# Patient Record
Sex: FEMALE | Race: WHITE
[De-identification: names, ages, dates, MRNs, and addresses within clinical notes are randomized per-mention and may not be internally consistent; named-entity substitution may affect disease eponyms.]

---

## 2017-11-20 ENCOUNTER — HOSPITAL ENCOUNTER (EMERGENCY)
Dept: HOSPITAL 56 - MW.ED | Age: 77
Discharge: HOME | End: 2017-11-20
Payer: MEDICARE

## 2017-11-20 VITALS — DIASTOLIC BLOOD PRESSURE: 32 MMHG | SYSTOLIC BLOOD PRESSURE: 112 MMHG

## 2017-11-20 DIAGNOSIS — I50.9: ICD-10-CM

## 2017-11-20 DIAGNOSIS — Z88.5: ICD-10-CM

## 2017-11-20 DIAGNOSIS — S01.01XA: ICD-10-CM
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
## 2017-11-20 DIAGNOSIS — Z79.82: ICD-10-CM

## 2017-11-20 DIAGNOSIS — Z91.040: ICD-10-CM

## 2017-11-20 DIAGNOSIS — Z79.899: ICD-10-CM

## 2017-11-20 DIAGNOSIS — S06.0X0A: Primary | ICD-10-CM

## 2017-11-20 LAB
CHLORIDE SERPL-SCNC: 108 MMOL/L (ref 98–110)
SODIUM SERPL-SCNC: 142 MMOL/L (ref 136–146)

## 2017-11-20 PROCEDURE — 72125 CT NECK SPINE W/O DYE: CPT

## 2017-11-20 PROCEDURE — 72170 X-RAY EXAM OF PELVIS: CPT

## 2017-11-20 PROCEDURE — 85610 PROTHROMBIN TIME: CPT

## 2017-11-20 PROCEDURE — 85025 COMPLETE CBC W/AUTO DIFF WBC: CPT

## 2017-11-20 PROCEDURE — 82553 CREATINE MB FRACTION: CPT

## 2017-11-20 PROCEDURE — 93005 ELECTROCARDIOGRAM TRACING: CPT

## 2017-11-20 PROCEDURE — 99285 EMERGENCY DEPT VISIT HI MDM: CPT

## 2017-11-20 PROCEDURE — G0390 TRAUMA RESPONS W/HOSP CRITI: HCPCS

## 2017-11-20 PROCEDURE — 12002 RPR S/N/AX/GEN/TRNK2.6-7.5CM: CPT

## 2017-11-20 PROCEDURE — 71010: CPT

## 2017-11-20 PROCEDURE — 36415 COLL VENOUS BLD VENIPUNCTURE: CPT

## 2017-11-20 PROCEDURE — 80053 COMPREHEN METABOLIC PANEL: CPT

## 2017-11-20 PROCEDURE — 84484 ASSAY OF TROPONIN QUANT: CPT

## 2017-11-20 PROCEDURE — 70450 CT HEAD/BRAIN W/O DYE: CPT

## 2017-11-20 PROCEDURE — 82550 ASSAY OF CK (CPK): CPT

## 2017-11-20 NOTE — CT
EXAMINATION: CT cervical spine

 

HISTORY: Pain

 

COMPARISON: None

 

TECHNIQUE: Axial CT images obtained through the cervical spine without contrast. Coronal and sagittal
 reconstructions obtained. Findings detail is obscured secondary to body habitus.

 

FINDINGS: Mild reversal of the normal cervical lordosis. Posterior fusion hardware is noted bilateral
ly from C3 to C6 with overlying laminectomies. Bone mineralization appears grossly normal. No definit
e fracture or acute osseous abnormality. The paravertebral soft tissues appear normal. The lung apice
s are clear.

 

IMPRESSION: 

1. Postsurgical changes noted within the cervical spine without an acute osseous abnormality identifi
ed.

## 2017-11-20 NOTE — CR
EXAMINATION: Pelvis

 

HISTORY: Pain

 

COMPARISON: CT dated 12/30/2016

 

TECHNIQUE: AP view of the pelvis.

 

FINDINGS: There is no acute osseous abnormality, dislocation, or fracture. The iliopectineal and ilio
ischial lines are intact. SI joints are symmetric. Bone mineralization is normal. Hip joint spaces ar
e grossly preserved.

 

IMPRESSION: No acute osseous abnormality identified.

## 2017-11-20 NOTE — CT
EXAMINATION:  Non contrast CT head. Coronal and sagittal reformats.

 

HISTORY: Pain

 

FINDINGS:  

No evidence of intra or extra axial hemorrhage, mass,  midline shift, hydrocephalus or edema. There i
s mild generalized atrophy. Moderate periventricular and subcortical white matter hypodensities noted
.

 

No hypoattenuation changes in the major vascular territories to suggest acute infarct.  No abnormal i
ntracranial calcifications are detected.  No evidence of substantial vascular calcifications.

 

Paranasal sinuses and mastoid air cells are well aerated without substantial findings.  Pituitary fos
sa appears unremarkable.  The calvarium is intact. No evidence of skull fracture. 

 

IMPRESSION: 

 

1. No acute intracranial findings.

2. Generalized atrophy and small vessel ischemic changes.

## 2017-11-20 NOTE — EDM.PDOC
ED HPI GENERAL MEDICAL PROBLEM





- General


Stated Complaint: PATIENT FALL


Time Seen by Provider: 17 11:19


Source of Information: Reports: Patient





- History of Present Illness


INITIAL COMMENTS - FREE TEXT/NARRATIVE: 





HISTORY AND PHYSICAL:


History of present illness:





[Patient resides at [facility] she had a fall today no loss of consciousness 

reported to presents via EMS with a bleeding laceration right occipital, pupils 

are slightly unequal .  patient is alert she is generally confused secondary to 

dementia, Glascow coma scale is 14]





Review of systems: 


As per history of present illness and below otherwise all systems reviewed and 

negative.


Past medical history: 


As per history of present illness and as reviewed below otherwise 

noncontributory.


Surgical history: 


As per history of present illness and as reviewed below otherwise 

noncontributory.


Social history: 


No reported history of drug or alcohol abuse.


Family history: 


As per history of present illness and as reviewed below otherwise 

noncontributory.








Physical exam:


HEENT: Atraumatic, normocephalic, pupils reactive, right pupil is 5 mm left is 

4 negative for conjunctival pallor or scleral icterus, mucous membranes moist, 

throat clear, neck supple, nontender, trachea midline.


Lungs: Clear to auscultation, breath sounds equal bilaterally, chest nontender.


Heart: S1S2, regular, negative for clicks, rubs, or JVD.


Abdomen: Soft, nondistended, nontender. Negative for masses or 

hepatosplenomegaly. Negative for costovertebral tenderness.


Pelvis: Stable nontender.


Genitourinary: Deferred.


Rectal: Deferred.


Extremities: Atraumatic, negative for cords or calf pain. Neurovascular 

unremarkable.


Neuro: Awake, alert, oriented. Cranial nerves II through XII unremarkable. 

Cerebellum unremarkable. Motor and sensory unremarkable throughout. Exam 

nonfocal. [place] Coma Scale 14 on arrival








Diagnostics:


[]Head CT no contrast


Cervical spine no contrast


Chest 1 view


Pelvis 1 view





EKG





Lab as below





Therapeutics


Tetanus status is updated


Lidocaine


Wound is cleansed and explored


#4 staples placed without complication








Impression: 


[]Fall


Concussion


Head laceration right occiput





[facility] would keep the patient for observation however we are out of beds and 

our nursing director as informed me that she could not keep this patient, in 

lieu of negative head CT left return the patient to [facility] for neuro checks





Definitive disposition and diagnosis as appropriate pending reevaluation and 

review of above.


  ** Head


Pain Score (Numeric/FACES): 5





- Related Data


 Allergies











Allergy/AdvReac Type Severity Reaction Status Date / Time


 


codeine Allergy  Other Verified 16 17:26


 


latex Allergy  Other Verified 16 17:26


 


phosphates Allergy  Other Uncoded 16 17:26











Home Meds: 


 Home Meds





Clopidogrel Bisulfate [Clopidogrel] 75 mg PO DAILY 16 [History]


Metoprolol Succinate 25 mg PO DAILY 16 [History]


Nitroglycerin [Nitrostat] 0.4 mg SL ASDIRECTED PRN 16 [History]


Omeprazole [Prilosec] 20 mg PO DAILY 16 [History]


atorvaSTATin [Lipitor] 20 mg PO BEDTIME 16 [History]


Aspirin 81 mg PO DAILY 16 [History]


Diltiazem HCl [Cardizem Cd] 360 mg PO DAILY 16 [History]


Isosorbide Mononitrate [Isosorbide Mononitrate ER] 30 mg PO DAILY 16 [

History]


Acetaminophen 500 mg PO Q4H PRN 10/31/16 [History]


Brimonidine Tartrate [Alphagan P 0.1% Ophth Soln] 1 drop EYERT TID 10/31/16 [

History]


Bumetanide 2 mg PO DAILY 10/31/16 [History]


Multivitamin W-Minerals/Lutein [Vision Plus Lutein Vitamin] 1 tab PO DAILY  [History]


Ferrous Sulfate 325 mg PO TIDMEALS #90 tablet 16 [Rx]


Nystatin [Nystop] 1 applic TOP TID PRN 16 [History]


Acetaminophen 500 mg PO TID 17 [History]


Carboxymethylcellulose Sodium [Refresh Tears] 1 drop EYEBOTH ASDIRECTED PRN  [History]


Donepezil HCl [Aricept] 5 mg PO BID 17 [History]


Lisinopril 10 mg PO DAILY 17 [History]


Magnesium Hydroxide [Milk of Magnesia] 30 ml PO DAILY PRN 17 [History]


QUEtiapine [SEROquel] 125 mg PO BID 17 [History]











Past Medical History





- Past Health History


Medical/Surgical History: Denies Medical/Surgical History


HEENT History: Reports: Cataract


Cardiovascular History: Reports: Arrhythmia, Blood Clots/VTE/DVT, CAD, Heart 

Failure, Pacemaker, PVD


Other Cardiovascular History: CHF, DVT


Respiratory History: Reports: COPD, Other (See Below)


Other Respiratory History: Emphysema


Gastrointestinal History: Reports: GERD


Other Gastrointestinal History: "heartburn"


Genitourinary History: Reports: Chronic Renal Insuffiency


Other Genitourinary History: Pt came in from [place], CNA reported vaginal 

bleeding that started after breakfast today then went on to be heavy with blood 

clots through the afternoon.


OB/GYN History: Reports: Pregnancy


Musculoskeletal History: Reports: Arthritis, Other (See Below)


Other Musculoskeletal History: chronic hip pain left more than the right


Neurological History: Reports: CVA, Migraines


Psychiatric History: Reports: Anxiety, Depression


Other Psychiatric History: Violent Behavioral Outbursts


Endocrine/Metabolic History: Reports: None


Hematologic History: Reports: None


Immunologic History: Reports: None


Oncologic (Cancer) History: Reports: None


Dermatologic History: Reports: Other (See Below)


Other Dermatologic History: frequent yeast to abdominal folds and groins





- Infectious Disease History


Infectious Disease History: Reports: Chicken Pox, Measles, Meningitis





- Past Surgical History


Cardiovascular Surgical History: Reports: Other (See Below)


Female  Surgical History: Reports:  Section


Neurological Surgical History: Reports: Other (See Below)





Social & Family History





- Family History


Family Medical History: Noncontributory


Neurological: Reports: CVA


Psychiatric: Reports: Abuse, Victim of, Anxiety


Endocrine/Metabolic: Reports: Diabetes, type II


Immunologic: Reports: None





- Tobacco Use


Smoking Status *Q: Never Smoker


Second Hand Smoke Exposure: No





- Caffeine Use


Caffeine Use: Reports: Coffee


Caffeine Use Comment: unknown, pt chosing not to talk





- Alcohol Use


Days Per Week of Alcohol Use: 0





- Recreational Drug Use


Recreational Drug Use: No


Recreational Drug Use Frequency: Patient Refuses To Answer





- Living Situation & Occupation


Living situation: Reports: with Family


Occupation: Unemployed





ED ROS GENERAL





- Review of Systems


Review Of Systems: ROS reveals no pertinent complaints other than HPI.





ED EXAM, GENERAL





- Physical Exam


Exam: See Below





Course





- Vital Signs


Last Recorded V/S: 


 Last Vital Signs











Temp  97.7 F   17 11:22


 


Pulse  62   17 11:45


 


Resp  18   17 11:45


 


BP  137/35 L  17 11:45


 


Pulse Ox  96   17 11:45














- Orders/Labs/Meds


Orders: 


 Active Orders 24 hr











 Category Date Time Status


 


 UA W/MICROSCOPIC [URIN] Stat Lab  17 11:18 Uncollected











Labs: 


 Laboratory Tests











  17 Range/Units





  12:07 12:07 12:07 


 


WBC  5.77    (4.0-11.0)  K/uL


 


RBC  3.39 L    (4.30-5.90)  M/uL


 


Hgb  10.3 L    (12.0-16.0)  g/dL


 


Hct  31.9 L    (36.0-46.0)  %


 


MCV  94.1    (80.0-98.0)  fL


 


MCH  30.4    (27.0-32.0)  pg


 


MCHC  32.3    (31.0-37.0)  g/dL


 


RDW Std Deviation  47.5    (28.0-62.0)  fl


 


RDW Coeff of Jason  14    (11.0-15.0)  %


 


Plt Count  169    (150-400)  K/uL


 


MPV  9.70    (7.40-12.00)  fL


 


Neut % (Auto)  55.7    (48.0-80.0)  %


 


Lymph % (Auto)  34.8    (16.0-40.0)  %


 


Mono % (Auto)  4.9    (0.0-15.0)  %


 


Eos % (Auto)  3.6    (0.0-7.0)  %


 


Baso % (Auto)  1.0    (0.0-1.5)  %


 


Neut # (Auto)  3.2    (1.4-5.7)  K/uL


 


Lymph # (Auto)  2.0    (0.6-2.4)  K/uL


 


Mono # (Auto)  0.3    (0.0-0.8)  K/uL


 


Eos # (Auto)  0.2    (0.0-0.7)  K/uL


 


Baso # (Auto)  0.1    (0.0-0.1)  K/uL


 


Nucleated RBC %  0.0    /100WBC


 


Nucleated RBCs #  0    K/uL


 


INR   1.04   (0.86-1.11)  


 


Sodium    142  (136-146)  mmol/L


 


Potassium    5.5 H  (3.5-5.1)  mmol/L


 


Chloride    108  ()  mmol/L


 


Carbon Dioxide    26  (21-31)  mmol/L


 


BUN    41 H  (6.0-23.0)  mg/dL


 


Creatinine    1.8 H  (0.6-1.5)  mg/dL


 


Est Cr Clr Drug Dosing    18.20  mL/min


 


Estimated GFR (MDRD)    27.1  ml/min


 


Glucose    149 H  ()  mg/dL


 


Calcium    8.8  (8.8-10.8)  mg/dL


 


Total Bilirubin    0.3  (0.1-1.5)  mg/dL


 


AST    12  (5-40)  IU/L


 


ALT    11  (8-54)  IU/L


 


Alkaline Phosphatase    96  ()  


 


Creatine Kinase    63  (9-236)  IU/L


 


CK-MB (CK-2)    2.0  (0-6.6)  ng/ml


 


Troponin I    < 0.10  (0.0-0.29)  NG/ML


 


Total Protein    6.3  (6.0-8.0)  g/dL


 


Albumin    3.5  (3.4-4.8)  g/dL


 


Globulin    2.8  (2.0-3.5)  g/dL


 


Albumin/Globulin Ratio    1.3  (1.3-2.8)  











Meds: 


Medications














Discontinued Medications














Generic Name Dose Route Start Last Admin





  Trade Name Helga  PRN Reason Stop Dose Admin


 


Lidocaine HCl  20 ml  17 12:27  





  Xylocaine 1%  INJECT  17 12:28  





  ONETIME ONE   














Departure





- Departure


Time of Disposition: 14:06


Disposition: Home, Self-Care 01


Condition: Fair


Clinical Impression: 


 Laceration, Concussion








- Discharge Information


Referrals: 


PCP,None [Primary Care Provider] - 


Additional Instructions: 


Patient has negative head CT evaluation


Cervical spine was x-rayed no acute fracture


Pelvis and chest no acute process





Recommend neuro checks per protocol


Standard wound care


Staples out in 5 days





- My Orders


Last 24 Hours: 


My Active Orders





17 11:18


UA W/MICROSCOPIC [URIN] Stat 














- Assessment/Plan


Last 24 Hours: 


My Active Orders





17 11:18


UA W/MICROSCOPIC [URIN] Stat

## 2018-05-26 ENCOUNTER — HOSPITAL ENCOUNTER (EMERGENCY)
Dept: HOSPITAL 56 - MW.ED | Age: 78
Discharge: HOME | End: 2018-05-26
Payer: MEDICARE

## 2018-05-26 VITALS — DIASTOLIC BLOOD PRESSURE: 60 MMHG | SYSTOLIC BLOOD PRESSURE: 110 MMHG

## 2018-05-26 DIAGNOSIS — R10.9: Primary | ICD-10-CM

## 2018-05-26 DIAGNOSIS — F41.9: ICD-10-CM

## 2018-05-26 DIAGNOSIS — Z88.5: ICD-10-CM

## 2018-05-26 DIAGNOSIS — Z88.8: ICD-10-CM

## 2018-05-26 DIAGNOSIS — N18.9: ICD-10-CM

## 2018-05-26 DIAGNOSIS — Z91.040: ICD-10-CM

## 2018-05-26 DIAGNOSIS — J44.9: ICD-10-CM

## 2018-05-26 DIAGNOSIS — Z95.0: ICD-10-CM

## 2018-05-26 DIAGNOSIS — K21.9: ICD-10-CM

## 2018-05-26 DIAGNOSIS — F32.9: ICD-10-CM

## 2018-05-26 DIAGNOSIS — Z79.899: ICD-10-CM

## 2018-05-26 DIAGNOSIS — I50.9: ICD-10-CM

## 2018-05-26 LAB
CHLORIDE SERPL-SCNC: 100 MMOL/L (ref 98–107)
SODIUM SERPL-SCNC: 137 MMOL/L (ref 136–145)

## 2018-05-26 PROCEDURE — 74018 RADEX ABDOMEN 1 VIEW: CPT

## 2018-05-26 PROCEDURE — 36415 COLL VENOUS BLD VENIPUNCTURE: CPT

## 2018-05-26 PROCEDURE — 99284 EMERGENCY DEPT VISIT MOD MDM: CPT

## 2018-05-26 PROCEDURE — 85025 COMPLETE CBC W/AUTO DIFF WBC: CPT

## 2018-05-26 PROCEDURE — 80053 COMPREHEN METABOLIC PANEL: CPT

## 2018-05-26 NOTE — EDM.PDOC
ED HPI GENERAL MEDICAL PROBLEM





- General


Stated Complaint: ABDOMINAL PAIN


Time Seen by Provider: 18 11:51


Source of Information: Reports: Patient, Family


History Limitations: Reports: No Limitations





- History of Present Illness


INITIAL COMMENTS - FREE TEXT/NARRATIVE: 





HISTORY AND PHYSICAL:


[]77-year-old female sent from [facility] after 2 days of abdominal pain





History of Present Illness:


[]2 days of abdominal pain your to coming to the emergency department





Review of Systems:


As per history of present illness and below otherwise all 


systems reviewed and negative.  





Past medical history:


As per history of present illness and as reviewed below


otherwise noncontributory.





Surgical history:


As per history of present illness and as reviewed below


otherwise noncontributory.





Social history:


No reported history of drug or alcohol abuse.





Family history:


As per history of present illness and as reviewed below


otherwise noncontributory.





Physical exam:


Patient is somewhat confused but speaks in full sentences without any shortness 

of breath. Palpation of abdomen elicits comments of pain palpation of her legs 

elicits comments of pain palpation of her chest and arms elicits comments of 

pain.


HEENT: Atraumatic, normocehpalic, pupils reactive, negative for conjunctival 

pallor or scleral icterus, mucous membranes moist, throat clear, neck supple, 

nontender, trachea midline.  


Lungs: Clear to auscultation, breath sounds equal bilaterally, chest non 

tender.  


Heart: S1S2, regular, negative for clicks, rubs, or JVD.


Abdomen: Soft, nondistended, tender no rebound and no guarding.  Negative for 

masses or hepatossplenmegaly. Negative for costovertebral tenderness.


Pelvis: Stable nontender.


Genitourinary: Deferred.


Rectal: Deferred


Extremities: Atraumatic, negative for cords or calf pain.  Comfortable with 

laying on the cart have placed sheets folded under her knees and this has 

relieved some of the pain she does have some edema to the left leg which is 

chronic condition since her hip was replaced


Neurovascular unremarkable.


Neuro:  Awake, alert, oriented.  Cranial nerves II through XII


unremarkable.  Cerebellum unremarkable.  Motor and sensory unremarkable 

throughout.  Exam nonfocal.  





Diagnostics:


[]CBC CMP abdomen





Therapeutics:


[]Simethicone





Impression:


[]abd pain/ gas





Plan:


[] discharge to home


rx written for simethicone bid prn abd pain


follow up with your PCP next week


return to the ER as directed and discussed





Definitive disposition and diagnosis as appropriate pending


reevaluation and review of above.  





Onset: Gradual


Duration: Day(s):


Location: Reports: Abdomen


Quality: Reports: Ache


Severity: Moderate


Improves with: Reports: None


Worsens with: Reports: None


  ** Left Lower Abdomen


Pain Score (Numeric/FACES): 8





- Related Data


 Allergies











Allergy/AdvReac Type Severity Reaction Status Date / Time


 


codeine Allergy  Other Verified 16 17:26


 


latex Allergy  Other Verified 16 17:26


 


phosphates Allergy  Other Uncoded 16 17:26











Home Meds: 


 Home Meds





Clopidogrel Bisulfate [Clopidogrel] 75 mg PO DAILY 16 [History]


Metoprolol Succinate 25 mg PO DAILY 16 [History]


Nitroglycerin [Nitrostat] 0.4 mg SL ASDIRECTED PRN 16 [History]


Omeprazole [Prilosec] 20 mg PO DAILY 16 [History]


atorvaSTATin [Lipitor] 20 mg PO BEDTIME 16 [History]


Aspirin 81 mg PO DAILY 16 [History]


Diltiazem HCl [Cardizem Cd] 360 mg PO DAILY 16 [History]


Isosorbide Mononitrate [Isosorbide Mononitrate ER] 30 mg PO DAILY 16 [

History]


Acetaminophen 500 mg PO Q4H PRN 10/31/16 [History]


Brimonidine Tartrate [Alphagan P 0.1% Ophth Soln] 1 drop EYERT TID 10/31/16 [

History]


Bumetanide 2 mg PO DAILY 10/31/16 [History]


Multivitamin W-Minerals/Lutein [Vision Plus Lutein Vitamin] 1 tab PO DAILY  [History]


Ferrous Sulfate 325 mg PO TIDMEALS #90 tablet 16 [Rx]


Nystatin [Nystop] 1 applic TOP TID PRN 16 [History]


Acetaminophen 500 mg PO TID 17 [History]


Carboxymethylcellulose Sodium [Refresh Tears] 1 drop EYEBOTH ASDIRECTED PRN  [History]


Donepezil HCl [Aricept] 5 mg PO BID 17 [History]


Lisinopril 10 mg PO DAILY 17 [History]


Magnesium Hydroxide [Milk of Magnesia] 30 ml PO DAILY PRN 17 [History]


QUEtiapine [SEROquel] 125 mg PO BID 17 [History]











Past Medical History





- Past Health History


Medical/Surgical History: Denies Medical/Surgical History


HEENT History: Reports: Cataract


Cardiovascular History: Reports: Arrhythmia, Blood Clots/VTE/DVT, CAD, Heart 

Failure, Pacemaker, PVD


Other Cardiovascular History: CHF, DVT


Respiratory History: Reports: COPD, Other (See Below)


Other Respiratory History: Emphysema


Gastrointestinal History: Reports: GERD


Other Gastrointestinal History: "heartburn"


Genitourinary History: Reports: Chronic Renal Insuffiency


Other Genitourinary History: Pt came in from [place], CNA reported vaginal 

bleeding that started after breakfast today then went on to be heavy with blood 

clots through the afternoon.


OB/GYN History: Reports: Pregnancy


Musculoskeletal History: Reports: Arthritis, Other (See Below)


Other Musculoskeletal History: chronic hip pain left more than the right


Neurological History: Reports: CVA, Migraines


Psychiatric History: Reports: Anxiety, Depression


Other Psychiatric History: Violent Behavioral Outbursts


Endocrine/Metabolic History: Reports: None


Hematologic History: Reports: None


Immunologic History: Reports: None


Oncologic (Cancer) History: Reports: None


Dermatologic History: Reports: Other (See Below)


Other Dermatologic History: frequent yeast to abdominal folds and groins





- Infectious Disease History


Infectious Disease History: Reports: Chicken Pox, Measles, Meningitis





- Past Surgical History


Cardiovascular Surgical History: Reports: Other (See Below)


Female  Surgical History: Reports:  Section


Neurological Surgical History: Reports: Other (See Below)





Social & Family History





- Family History


Family Medical History: Noncontributory


Neurological: Reports: CVA


Psychiatric: Reports: Abuse, Victim of, Anxiety


Endocrine/Metabolic: Reports: Diabetes, type II


Immunologic: Reports: None





- Caffeine Use


Caffeine Use: Reports: Coffee


Caffeine Use Comment: unknown, pt chosing not to talk





- Living Situation & Occupation


Living situation: Reports: with Family


Occupation: Unemployed





ED ROS GENERAL





- Review of Systems


Review Of Systems: ROS reveals no pertinent complaints other than HPI.





ED EXAM, GENERAL





- Physical Exam


Exam: See Below (see dictation)





Course





- Vital Signs


Last Recorded V/S: 


 Last Vital Signs











Temp  35.6 C   18 11:53


 


Pulse  70   18 11:53


 


Resp  18   18 11:53


 


BP  110/60   18 11:53


 


Pulse Ox  95   18 11:53














- Orders/Labs/Meds


Orders: 


 Active Orders 24 hr











 Category Date Time Status


 


 Abdomen 1V Upright [CR] Stat Exams  18 12:30 Taken











Labs: 


 Laboratory Tests











  18 Range/Units





  12:44 12:44 


 


WBC  6.68   (4.0-11.0)  K/uL


 


RBC  3.80 L   (4.30-5.90)  M/uL


 


Hgb  11.4 L   (12.0-16.0)  g/dL


 


Hct  34.6 L   (36.0-46.0)  %


 


MCV  91.1   (80.0-98.0)  fL


 


MCH  30.0   (27.0-32.0)  pg


 


MCHC  32.9   (31.0-37.0)  g/dL


 


RDW Std Deviation  49.9   (28.0-62.0)  fl


 


RDW Coeff of Jason  15   (11.0-15.0)  %


 


Plt Count  214   (150-400)  K/uL


 


MPV  9.90   (7.40-12.00)  fL


 


Neut % (Auto)  62.5   (48.0-80.0)  %


 


Lymph % (Auto)  26.3   (16.0-40.0)  %


 


Mono % (Auto)  5.4   (0.0-15.0)  %


 


Eos % (Auto)  5.2   (0.0-7.0)  %


 


Baso % (Auto)  0.6   (0.0-1.5)  %


 


Neut # (Auto)  4.2   (1.4-5.7)  K/uL


 


Lymph # (Auto)  1.8   (0.6-2.4)  K/uL


 


Mono # (Auto)  0.4   (0.0-0.8)  K/uL


 


Eos # (Auto)  0.4   (0.0-0.7)  K/uL


 


Baso # (Auto)  0.0   (0.0-0.1)  K/uL


 


Nucleated RBC %  0.0   /100WBC


 


Nucleated RBCs #  0   K/uL


 


Sodium   137  (136-145)  mmol/L


 


Potassium   4.6  (3.5-5.1)  mmol/L


 


Chloride   100  ()  mmol/L


 


Carbon Dioxide   28.5  (21.0-32.0)  mmol/L


 


BUN   97 H  (7.0-18.0)  mg/dL


 


Creatinine   3.5 H  (0.6-1.0)  mg/dL


 


Est Cr Clr Drug Dosing   9.36  mL/min


 


Estimated GFR (MDRD)   12.6  ml/min


 


Glucose   129 H  ()  mg/dL


 


Calcium   9.1  (8.5-10.1)  mg/dL


 


Total Bilirubin   0.5  (0.2-1.0)  mg/dL


 


AST   15  (15-37)  IU/L


 


ALT   15  (14-63)  IU/L


 


Alkaline Phosphatase   113  ()  U/L


 


Total Protein   7.5  (6.4-8.2)  g/dL


 


Albumin   3.5  (3.4-5.0)  g/dL


 


Globulin   4.0 H  (2.0-3.5)  g/dL


 


Albumin/Globulin Ratio   0.9 L  (1.3-2.8)  











Meds: 


Medications














Discontinued Medications














Generic Name Dose Route Start Last Admin





  Trade Name Freq  PRN Reason Stop Dose Admin


 


Simethicone  80 mg  18 13:21  





  Simethicone  PO  18 13:22  





  ONETIME ONE   





     





     





     





     














Departure





- Departure


Time of Disposition: 13:33


Disposition: Home, Self-Care 01


Condition: Good


Clinical Impression: 


 Abdominal pain in female








- Discharge Information


Instructions:  Abdominal Pain, Adult


Referrals: 


Cecilio Haynes MD [Primary Care Provider] - 


Additional Instructions: 


The following information is given to patients seen in the emergency department 

who are being discharged to home. This information is to outline your options 

for follow-up care. We provide all patients seen in our emergency department 

with a follow-up referral.





The need for follow-up, as well as the timing and circumstances, are variable 

depending upon the specifics of your emergency department visit.





If you don't have a primary care physician on staff, we will provide you with a 

referral. We always advise you to contact your personal physician following an 

emergency department visit to inform them of the circumstance of the visit and 

for follow-up with them and/or the need for any referrals to a consulting 

specialist.





The emergency department will also refer you to a specialist when appropriate. 

This referral assures that you have the opportunity for followup care with a 

specialist. All of these measure are taken in an effort to provide you with 

optimal care, which includes your followup.





Under all circumstances we always encourage you to contact your private 

physician who remains a resource for coordinating  your care. When calling for 

followup care, please make the office aware that this follow-up is from your 

recent emergency room visit. If for any reason you are refused follow-up, 

please contact the [facility] emergency department at [phone] 

and asked to speak to the emergency department charge nurse.


You were given simethicone for the gas pain


Prescription for simethicone has been written on your order page


see your PCP next week


Her new emergency room hysterectomy discussed





- My Orders


Last 24 Hours: 


My Active Orders





18 12:30


Abdomen 1V Upright [CR] Stat 














- Assessment/Plan


Last 24 Hours: 


My Active Orders





18 12:30


Abdomen 1V Upright [CR] Stat

## 2018-05-28 NOTE — CR
EXAM DATE: 18



PATIENT'S AGE: 77





Patient: JONI SAENZ



Facility: [place], [place]

Patient ID: [id number]

Site Patient ID: [id number].

Site Accession #: [id number].

: 1940

Study: XRay Abdomen [id number]/26/2018 12:51:27 PM

Ordering Physician: Doctor Temp



Final Report: 

INDICATION: abd pain



FINDINGS:

A single view of the abdomen shows no dilated loops of bowel. No evidence of 
free intraperitoneal air. Pacemaker in place.



IMPRESSION:

Nonspecific nonobstructive bowel gas pattern.



Dictated by Michael Morrow MD @ 2018 1:13:38 PM







Dictated by: Michael Morrow MD @ 2018 13:13:54

(Electronic Signature)





Report Signed by Proxy.
KATIE

## 2018-05-31 NOTE — EDM.PDOC
ED HPI GENERAL MEDICAL PROBLEM





- General


Chief Complaint: General


Stated Complaint: ALTERED MENTAL STATUS


Time Seen by Provider: 18 12:28


Source of Information: Reports: Patient


History Limitations: Reports: No Limitations





- History of Present Illness


INITIAL COMMENTS - FREE TEXT/NARRATIVE: 


HISTORY AND PHYSICAL:





History of present illness:


Patient is a 77-year-old female who presents to the emergency room today with 

complaints of altered mental status (per nursing staff) and abdominal pain. The 

nursing home staff states that she appears to have altered mental status as she 

does not want to answer questions and appears to want to sleep more often. When 

asked, the patient says "my belly hurts". He was seen in our emergency room on  and did have routine labs along with an x-ray at that time and was 

encouraged to take simethicone for gas. 





Patient has multiple health problems such as chronic kidney disease, congestive 

heart failure, heart disease, history of CVA, peripheral vascular disease.





Review of systems: 


As per history of present illness and below otherwise all systems reviewed and 

negative.





Past medical history: 


As per history of present illness and as reviewed below otherwise 

noncontributory.





Surgical history: 


As per history of present illness and as reviewed below otherwise 

noncontributory.





Social history: 


No reported history of drug or alcohol abuse.





Family history: 


As per history of present illness and as reviewed below otherwise 

noncontributory.





Physical exam:


General: Well-developed and well-nourished 77-year-old female. Alert and 

disorientated, but appropriate for self. Appearing and in no acute distress.


HEENT: Atraumatic, normocephalic, pupils equal and reactive bilaterally, 

negative for conjunctival pallor or scleral icterus, mucous membranes moist, 

throat clear, neck supple, nontender, trachea midline. No drooling or trismus 

noted. No meningeal signs


Lungs: Clear to auscultation, breath sounds equal bilaterally, chest nontender.


Heart: S1S2, regular rate and rhythm without overt murmur


Abdomen: Soft, nondistended, nontender. Negative for masses or 

hepatosplenomegaly. Negative for costovertebral tenderness.


Pelvis: Stable nontender.


Genitourinary: Deferred.


Rectal: Deferred.


Skin: Intact, warm, dry. No lesions or rashes noted.


Extremities: Atraumatic, negative for cords or calf pain. Neurovascular 

unremarkable.


Neuro: Awake, alert, oriented. Cranial nerves II through XII unremarkable. 

Cerebellum unremarkable. Motor and sensory unremarkable throughout. Exam 

nonfocal.





Notes:


Patient was seen in the ER on 2018 routine lab work and an x-ray was done. 

At that time her BUN and creatinine were elevated but not treated aggressively. 

She returned to the nursing home as a prescription for simethicone to help 

alleviate her gas pain. Patient is a Code Level 1. At this time there is no 

nursing home staff or family members at the bedside. Through my experience the 

patient is alert and appropriate for self, I do not note any altered mental 

status as she is verbally responsive per her usual.





1353- CT of the abdomen shows an unexpected amount of air in the uterus and 

vaginal vault. This is not communicating into the peritoneum and there is no 

free air noted. I did consult Dr. Chow, OB on-call, and shared these results 

with him. He states that this is not of concern at this time and does not 

require any further intervention. Attempt to contact family to discuss possible 

admission vs transfer. 





1400 -  (was 97 on 18) and Creatinine 6.0 (was 3.5 on 18). 

Baseline is usually elevated; history of chronic renal disease. Potassium and 

Bicarb are within normal limits. Dr Bender was consulted on this case. He will 

come down to see her to evaluate patient. 





1500- Dr Bender is here to evaluate patient. Nursing staff and myself are still 

unable to get ahold of patient's next of kin/family.





Diagnostics:


CBC, CMP, UA, CT abdomen and pelvis





Therapeutics:


 IV fluid





Impression: 


Dehydration


Abdominal Pain


Renal disease, acute vs chronic





Plan:


Inpatient admission





Definitive disposition and diagnosis as appropriate pending reevaluation and 

review of above.








- Related Data


 Allergies











Allergy/AdvReac Type Severity Reaction Status Date / Time


 


codeine Allergy  Other Verified 18 12:58


 


latex Allergy  Other Verified 18 12:58


 


phosphates Allergy  Other Uncoded 18 12:58











Home Meds: 


 Home Meds





Clopidogrel Bisulfate [Clopidogrel] 75 mg PO DAILY 16 [History]


Metoprolol Succinate 25 mg PO DAILY 16 [History]


Nitroglycerin [Nitrostat] 0.4 mg SL .EVERY 5 MINUTES PRN 16 [History]


atorvaSTATin [Lipitor] 20 mg PO BEDTIME 16 [History]


Aspirin 81 mg PO DAILY 16 [History]


Diltiazem HCl [Cardizem Cd] 360 mg PO DAILY 16 [History]


Isosorbide Mononitrate [Isosorbide Mononitrate ER] 30 mg PO DAILY 16 [

History]


Acetaminophen 500 mg PO TID 10/31/16 [History]


Brimonidine Tartrate [Alphagan P 0.1% Ophth Soln] 1 drop EYERT QAM 10/31/16 [

History]


Bumetanide 2 mg PO DAILY 10/31/16 [History]


Multivitamin W-Minerals/Lutein [Vision Plus Lutein Vitamin] 1 tab PO DAILY  [History]


Ferrous Sulfate 325 mg PO TIDMEALS #90 tablet 16 [Rx]


Nystatin [Nystop] 1 applic TOP TID PRN 16 [History]


Acetaminophen 500 mg PO Q4H PRN 17 [History]


Carboxymethylcellulose Sodium [Refresh Tears] 1 drop EYEBOTH ASDIRECTED PRN  [History]


Lisinopril 10 mg PO DAILY 17 [History]


Magnesium Hydroxide [Milk of Magnesia] 30 ml PO DAILY PRN 17 [History]


QUEtiapine [SEROquel] 100 mg PO BID 17 [History]


Albuterol Sulfate 2.5 mg INH Q4H PRN 18 [History]


Bumetanide 2 mg PO DAILY 18 [History]


Donepezil HCl [Aricept] 23 mg PO DAILY 18 [History]


Famotidine 40 mg PO BEDTIME 18 [History]


Metolazone 5 mg PO MOWEFR@0800 18 [History]


Simethicone 80 mg PO Q12H PRN 18 [History]


Umeclidinium Bromide [Incruse Ellipta*] 1 inh IH DAILY 18 [History]











Past Medical History





- Past Health History


Medical/Surgical History: Denies Medical/Surgical History


HEENT History: Reports: Cataract


Cardiovascular History: Reports: Arrhythmia, Blood Clots/VTE/DVT, CAD, Heart 

Failure, Pacemaker, PVD


Other Cardiovascular History: CHF, DVT


Respiratory History: Reports: COPD, Other (See Below)


Other Respiratory History: Emphysema


Gastrointestinal History: Reports: GERD


Other Gastrointestinal History: "heartburn"


Genitourinary History: Reports: Chronic Renal Insuffiency


Other Genitourinary History: Pt came in from [place], CNA reported vaginal 

bleeding that started after breakfast today then went on to be heavy with blood 

clots through the afternoon.


OB/GYN History: Reports: Pregnancy


Musculoskeletal History: Reports: Arthritis, Other (See Below)


Other Musculoskeletal History: chronic hip pain left more than the right


Neurological History: Reports: CVA, Migraines


Other Neuro History: Encephalopathy, facial weakness/hemiphlegia/hemiparesis 

following cerebral infarction,


Psychiatric History: Reports: Anxiety, Depression


Other Psychiatric History: Violent Behavioral Outbursts


Endocrine/Metabolic History: Reports: None


Hematologic History: Reports: None


Immunologic History: Reports: None


Oncologic (Cancer) History: Reports: None


Dermatologic History: Reports: Other (See Below)


Other Dermatologic History: frequent yeast to abdominal folds and groins





- Infectious Disease History


Infectious Disease History: Reports: Chicken Pox, Measles, Meningitis





- Past Surgical History


Cardiovascular Surgical History: Reports: Other (See Below)


Female  Surgical History: Reports:  Section


Neurological Surgical History: Reports: Other (See Below)





Social & Family History





- Family History


Family Medical History: Noncontributory


Neurological: Reports: CVA


Psychiatric: Reports: Abuse, Victim of, Anxiety


Endocrine/Metabolic: Reports: Diabetes, type II


Immunologic: Reports: None





- Caffeine Use


Caffeine Use: Reports: Coffee


Caffeine Use Comment: unknown, pt chosing not to talk





- Living Situation & Occupation


Living situation: Reports: with Family


Occupation: Unemployed





ED ROS GENERAL





- Review of Systems


Review Of Systems: ROS reveals no pertinent complaints other than HPI.





ED EXAM, GENERAL





- Physical Exam


Exam: See Below (See dictation)





Course





- Vital Signs


Last Recorded V/S: 


 Last Vital Signs











Temp  98.9 F   18 12:00


 


Pulse  60   18 12:00


 


Resp  16   18 12:00


 


BP  107/32 L  18 12:00


 


Pulse Ox  94 L  18 12:00














- Orders/Labs/Meds


Orders: 


 Active Orders 24 hr











 Category Date Time Status


 


 UA W/MICROSCOPIC [URIN] Stat Lab  18 15:09 Ordered


 


 Sodium Chloride 0.9% [Normal Saline] 500 ml Med  18 13:45 Active





 IV STAT   








 Medication Orders





Albuterol/Ipratropium (Duoneb 3.0-0.5 Mg/3 Ml)  3 ml NEB Q4HRRT PRN


   PRN Reason: Wheezing


   Last Admin: 18 06:55  Dose: 3 ml


Atorvastatin Calcium (Lipitor)  20 mg PO BEDTIME [facility]


   Last Admin: 18 20:37  Dose: 20 mg


Clopidogrel Bisulfate (Plavix)  75 mg PO DAILY [facility]


   Last Admin: 18 09:03  Dose: 75 mg


Heparin Sodium (Porcine) (Heparin Sodium)  5,000 units SUBCUT Q12H [facility]


   Last Admin: 18 04:21  Dose: 5,000 units


   Admin: 18 17:37  Dose: 5,000 units


Sodium Chloride (Normal Saline)  500 mls @ 999 mls/hr IV STAT [facility]


Sodium Chloride (Normal Saline)  1,000 mls @ 125 mls/hr IV ASDIRECTED [facility]


   Last Admin: 18 11:14  Dose: 125 mls/hr


   Infusion: 18 11:02  Dose: 125 mls/hr


   Admin: 18 03:02  Dose: 125 mls/hr


   Infusion: 18 01:16  Dose: 125 mls/hr


   Admin: 18 17:16  Dose: 125 mls/hr


Non-Formulary Medication (Brimonidine Tartrate)  1 drop EYERT TID [facility]


   Last Admin: 18 05:55  Dose:  


   Admin: 18 22:57  Dose:  


Ondansetron HCl (Zofran)  4 mg IVPUSH Q4H PRN


   PRN Reason: Nausea


Sodium Chloride (Saline Flush)  10 ml FLUSH ASDIRECTED PRN


   PRN Reason: Keep Vein Open


   Last Admin: 18 16:24  Dose: 10 ml


Sodium Chloride (Saline Flush)  2.5 ml FLUSH ASDIRECTED PRN


   PRN Reason: Keep Vein Open








Labs: 


 Laboratory Tests











  18 Range/Units





  13:11 13:11 15:09 


 


WBC  8.70    (4.0-11.0)  K/uL


 


RBC  3.59 L    (4.30-5.90)  M/uL


 


Hgb  10.9 L    (12.0-16.0)  g/dL


 


Hct  33.0 L    (36.0-46.0)  %


 


MCV  91.9    (80.0-98.0)  fL


 


MCH  30.4    (27.0-32.0)  pg


 


MCHC  33.0    (31.0-37.0)  g/dL


 


RDW Std Deviation  49.8    (28.0-62.0)  fl


 


RDW Coeff of Jason  15    (11.0-15.0)  %


 


Plt Count  209    (150-400)  K/uL


 


MPV  10.10    (7.40-12.00)  fL


 


Neut % (Auto)  67.2    (48.0-80.0)  %


 


Lymph % (Auto)  21.7    (16.0-40.0)  %


 


Mono % (Auto)  6.0    (0.0-15.0)  %


 


Eos % (Auto)  4.6    (0.0-7.0)  %


 


Baso % (Auto)  0.5    (0.0-1.5)  %


 


Neut # (Auto)  5.9 H    (1.4-5.7)  K/uL


 


Lymph # (Auto)  1.9    (0.6-2.4)  K/uL


 


Mono # (Auto)  0.5    (0.0-0.8)  K/uL


 


Eos # (Auto)  0.4    (0.0-0.7)  K/uL


 


Baso # (Auto)  0.0    (0.0-0.1)  K/uL


 


Nucleated RBC %  0.0    /100WBC


 


Nucleated RBCs #  0    K/uL


 


Sodium   135 L   (136-145)  mmol/L


 


Potassium   4.6   (3.5-5.1)  mmol/L


 


Chloride   96 L   ()  mmol/L


 


Carbon Dioxide   26.5   (21.0-32.0)  mmol/L


 


BUN   113 H   (7.0-18.0)  mg/dL


 


Creatinine   6.0 H   (0.6-1.0)  mg/dL


 


Est Cr Clr Drug Dosing   5.46   mL/min


 


Estimated GFR (MDRD)   6.8   ml/min


 


Glucose   120 H   ()  mg/dL


 


Calcium   8.4 L   (8.5-10.1)  mg/dL


 


Total Bilirubin   0.3   (0.2-1.0)  mg/dL


 


AST   25   (15-37)  IU/L


 


ALT   17   (14-63)  IU/L


 


Alkaline Phosphatase   111   ()  U/L


 


Total Protein   6.9   (6.4-8.2)  g/dL


 


Albumin   3.4   (3.4-5.0)  g/dL


 


Globulin   3.5   (2.0-3.5)  g/dL


 


Albumin/Globulin Ratio   1.0 L   (1.3-2.8)  


 


Urine Color    YELLOW  


 


Urine Appearance    CLEAR  


 


Urine pH    5.5  (5.0-8.0)  


 


Ur Specific Gravity    1.015  (1.001-1.035)  


 


Urine Protein    NEGATIVE  (NEGATIVE)  mg/dL


 


Urine Glucose (UA)    NEGATIVE  (NEGATIVE)  mg/dL


 


Urine Ketones    NEGATIVE  (NEGATIVE)  mg/dL


 


Urine Occult Blood    TRACE-LYSED  (NEGATIVE)  


 


Urine Nitrite    NEGATIVE  (NEGATIVE)  


 


Urine Bilirubin    NEGATIVE  (NEGATIVE)  


 


Urine Urobilinogen    0.2  (<2.0)  EU/dL


 


Ur Leukocyte Esterase    NEGATIVE  (NEGATIVE)  


 


Urine RBC    0-1  (0-2/HPF)  


 


Urine WBC    0-1  (0-5/HPF)  


 


Ur Epithelial Cells    RARE  (NONE-FEW)  


 


Urine Bacteria    RARE  (NEGATIVE)  











Meds: 


Medications











Generic Name Dose Route Start Last Admin





  Trade Name Freq  PRN Reason Stop Dose Admin


 


Albuterol/Ipratropium  3 ml  18 06:29  18 06:55





  Duoneb 3.0-0.5 Mg/3 Ml  NEB   3 ml





  Q4HRRT PRN   Administration





  Wheezing   





     





     





     


 


Atorvastatin Calcium  20 mg  18 21:00  18 20:37





  Lipitor  PO   20 mg





  BEDTIME MANE   Administration





     





     





     





     


 


Clopidogrel Bisulfate  75 mg  18 09:00  18 09:03





  Plavix  PO   75 mg





  DAILY MAN[facility]   Administration





     





     





     





     


 


Heparin Sodium (Porcine)  5,000 units  18 15:45  18 04:21





  Heparin Sodium  SUBCUT   5,000 units





  Q12H MANE   Administration





     





     





     





     


 


Sodium Chloride  500 mls @ 999 mls/hr  18 13:45  





  Normal Saline  IV   





  STAT MANE   





     





     





     





     


 


Sodium Chloride  1,000 mls @ 125 mls/hr  18 15:45  18 11:14





  Normal Saline  IV   125 mls/hr





  ASDIRECTED MANE   Administration





     





     





     





     


 


Non-Formulary Medication  1 drop  18 22:00  18 05:55





  Brimonidine Tartrate  EYERT   Not Given





  TID [facility]   





     





     





     





     


 


Ondansetron HCl  4 mg  18 15:33  





  Zofran  IVPUSH   





  Q4H PRN   





  Nausea   





     





     





     


 


Sodium Chloride  10 ml  18 12:33  18 16:24





  Saline Flush  FLUSH   10 ml





  ASDIRECTED PRN   Administration





  Keep Vein Open   





     





     





     


 


Sodium Chloride  2.5 ml  18 12:33  





  Saline Flush  FLUSH   





  ASDIRECTED PRN   





  Keep Vein Open   





     





     





     














Discontinued Medications














Generic Name Dose Route Start Last Admin





  Trade Name Freq  PRN Reason Stop Dose Admin


 


Diltiazem HCl  360 mg  18 09:00  





  Cardizem Cd  PO   





  DAILY [facility]   





     





     





     





     


 


Metoprolol Succinate  25 mg  18 09:00  





  Toprol Xl  PO   





  DAILY [facility]   





     





     





     





     














Departure





- Departure


Time of Disposition: 19:45


Disposition: Admitted As Inpatient 64


Clinical Impression: 


 Dehydration, Renal insufficiency








- Discharge Information





- My Orders


Last 24 Hours: 


My Active Orders





18 13:45


Sodium Chloride 0.9% [Normal Saline] 500 ml IV STAT 





18 15:09


UA W/MICROSCOPIC [URIN] Stat 














- Assessment/Plan


Last 24 Hours: 


My Active Orders





18 13:45


Sodium Chloride 0.9% [Normal Saline] 500 ml IV STAT 





18 15:09


UA W/MICROSCOPIC [URIN] Stat

## 2018-05-31 NOTE — PCM.HP
H&P History of Present Illness





- General


Admit Problem/Dx: 


 Admission Diagnosis/Problem





Admission Diagnosis/Problem      Dehydration











- History of Present Illness


Initial Comments - Free Text/Narative: 


77 year old female with pmh of CAD, CVA, CKD, and CHF who comes from [place] due 

to nursing complaints of altered mental status unable to follow commands.  

Patient denies any pain or shortness of breath, but feels ill.  She was found 

to have a creatinine of 6.0 and BUN of 113.  








- Related Data


Allergies/Adverse Reactions: 


 Allergies











Allergy/AdvReac Type Severity Reaction Status Date / Time


 


codeine Allergy  Other Verified 18 12:58


 


latex Allergy  Other Verified 18 12:58


 


phosphates Allergy  Other Uncoded 18 12:58











Home Medications: 


 Home Meds





Clopidogrel Bisulfate [Clopidogrel] 75 mg PO DAILY 16 [History]


Metoprolol Succinate 25 mg PO DAILY 16 [History]


Nitroglycerin [Nitrostat] 0.4 mg SL .EVERY 5 MINUTES PRN 16 [History]


atorvaSTATin [Lipitor] 20 mg PO BEDTIME 16 [History]


Aspirin 81 mg PO DAILY 16 [History]


Diltiazem HCl [Cardizem Cd] 360 mg PO DAILY 16 [History]


Isosorbide Mononitrate [Isosorbide Mononitrate ER] 30 mg PO DAILY 16 [

History]


Acetaminophen 500 mg PO TID 10/31/16 [History]


Brimonidine Tartrate [Alphagan P 0.1% Ophth Soln] 1 drop EYERT QAM 10/31/16 [

History]


Bumetanide 2 mg PO DAILY 10/31/16 [History]


Multivitamin W-Minerals/Lutein [Vision Plus Lutein Vitamin] 1 tab PO DAILY  [History]


Ferrous Sulfate 325 mg PO TIDMEALS #90 tablet 16 [Rx]


Nystatin [Nystop] 1 applic TOP TID PRN 16 [History]


Acetaminophen 500 mg PO Q4H PRN 17 [History]


Carboxymethylcellulose Sodium [Refresh Tears] 1 drop EYEBOTH ASDIRECTED PRN  [History]


Lisinopril 10 mg PO DAILY 17 [History]


Magnesium Hydroxide [Milk of Magnesia] 30 ml PO DAILY PRN 17 [History]


QUEtiapine [SEROquel] 100 mg PO BID 17 [History]


Albuterol Sulfate 2.5 mg INH Q4H PRN 18 [History]


Bumetanide 2 mg PO DAILY 18 [History]


Donepezil HCl [Aricept] 23 mg PO DAILY 18 [History]


Famotidine 40 mg PO BEDTIME 18 [History]


Metolazone 5 mg PO MOWEFR@0800 18 [History]


Simethicone 80 mg PO Q12H PRN 18 [History]


Umeclidinium Bromide [Incruse Ellipta*] 1 inh IH DAILY 18 [History]











Past Medical History





- Past Health History


Medical/Surgical History: Denies Medical/Surgical History


HEENT History: Reports: Cataract


Cardiovascular History: Reports: Arrhythmia, Blood Clots/VTE/DVT, CAD, Heart 

Failure, Pacemaker, PVD


Other Cardiovascular History: CHF, DVT


Respiratory History: Reports: COPD, Other (See Below)


Other Respiratory History: Emphysema


Gastrointestinal History: Reports: GERD


Other Gastrointestinal History: "heartburn"


Genitourinary History: Reports: Chronic Renal Insuffiency


Other Genitourinary History: Pt came in from [place], CNA reported vaginal 

bleeding that started after breakfast today then went on to be heavy with blood 

clots through the afternoon.


OB/GYN History: Reports: Pregnancy


Musculoskeletal History: Reports: Arthritis, Other (See Below)


Other Musculoskeletal History: chronic hip pain left more than the right


Neurological History: Reports: CVA, Migraines


Other Neuro History: Encephalopathy, facial weakness/hemiphlegia/hemiparesis 

following cerebral infarction,


Psychiatric History: Reports: Anxiety, Depression


Other Psychiatric History: Violent Behavioral Outbursts


Endocrine/Metabolic History: Reports: None


Hematologic History: Reports: None


Immunologic History: Reports: None


Oncologic (Cancer) History: Reports: None


Dermatologic History: Reports: Other (See Below)


Other Dermatologic History: frequent yeast to abdominal folds and groins





- Infectious Disease History


Infectious Disease History: Reports: Chicken Pox, Measles, Meningitis





- Past Surgical History


Cardiovascular Surgical History: Reports: Other (See Below)


Female  Surgical History: Reports:  Section


Neurological Surgical History: Reports: Other (See Below)





Social & Family History





- Family History


Family Medical History: Noncontributory


Neurological: Reports: CVA


Psychiatric: Reports: Abuse, Victim of, Anxiety


Endocrine/Metabolic: Reports: Diabetes, type II


Immunologic: Reports: None





- Caffeine Use


Caffeine Use: Reports: Coffee


Caffeine Use Comment: unknown, pt chosing not to talk





- Living Situation & Occupation


Living situation: Reports: with Family


Occupation: Unemployed





H&P Review of Systems





- Review of Systems:


Review Of Systems: ROS reveals no pertinent complaints other than HPI.





Exam





- Exam


Exam: See Below





- Vital Signs


Vital Signs: 


 Last Vital Signs











Temp  36.2 C   18 12:47


 


Pulse  70   18 12:47


 


Resp  18   18 12:47


 


BP  105/67   18 12:47


 


Pulse Ox  94 L  18 12:47











Weight: 179.2 kg





- Exam


General: No: Moderate Distress


HEENT: Posterior Pharynx Clear


Neck: Supple


Lungs: Clear to Auscultation, Normal Respiratory Effort


Cardiovascular: Regular Rate, Regular Rhythm


GI/Abdominal Exam: Normal Bowel Sounds, Soft, Non-Tender


Extremities: No Pedal Edema


Skin: Warm, Dry, Intact





- Patient Data


Lab Results Last 24 hrs: 


 Laboratory Results - last 24 hr











  18 Range/Units





  13:11 13:11 15:09 


 


WBC  8.70    (4.0-11.0)  K/uL


 


RBC  3.59 L    (4.30-5.90)  M/uL


 


Hgb  10.9 L    (12.0-16.0)  g/dL


 


Hct  33.0 L    (36.0-46.0)  %


 


MCV  91.9    (80.0-98.0)  fL


 


MCH  30.4    (27.0-32.0)  pg


 


MCHC  33.0    (31.0-37.0)  g/dL


 


RDW Std Deviation  49.8    (28.0-62.0)  fl


 


RDW Coeff of Jason  15    (11.0-15.0)  %


 


Plt Count  209    (150-400)  K/uL


 


MPV  10.10    (7.40-12.00)  fL


 


Neut % (Auto)  67.2    (48.0-80.0)  %


 


Lymph % (Auto)  21.7    (16.0-40.0)  %


 


Mono % (Auto)  6.0    (0.0-15.0)  %


 


Eos % (Auto)  4.6    (0.0-7.0)  %


 


Baso % (Auto)  0.5    (0.0-1.5)  %


 


Neut # (Auto)  5.9 H    (1.4-5.7)  K/uL


 


Lymph # (Auto)  1.9    (0.6-2.4)  K/uL


 


Mono # (Auto)  0.5    (0.0-0.8)  K/uL


 


Eos # (Auto)  0.4    (0.0-0.7)  K/uL


 


Baso # (Auto)  0.0    (0.0-0.1)  K/uL


 


Nucleated RBC %  0.0    /100WBC


 


Nucleated RBCs #  0    K/uL


 


Sodium   135 L   (136-145)  mmol/L


 


Potassium   4.6   (3.5-5.1)  mmol/L


 


Chloride   96 L   ()  mmol/L


 


Carbon Dioxide   26.5   (21.0-32.0)  mmol/L


 


BUN   113 H   (7.0-18.0)  mg/dL


 


Creatinine   6.0 H   (0.6-1.0)  mg/dL


 


Est Cr Clr Drug Dosing   5.46   mL/min


 


Estimated GFR (MDRD)   6.8   ml/min


 


Glucose   120 H   ()  mg/dL


 


Calcium   8.4 L   (8.5-10.1)  mg/dL


 


Total Bilirubin   0.3   (0.2-1.0)  mg/dL


 


AST   25   (15-37)  IU/L


 


ALT   17   (14-63)  IU/L


 


Alkaline Phosphatase   111   ()  U/L


 


Total Protein   6.9   (6.4-8.2)  g/dL


 


Albumin   3.4   (3.4-5.0)  g/dL


 


Globulin   3.5   (2.0-3.5)  g/dL


 


Albumin/Globulin Ratio   1.0 L   (1.3-2.8)  


 


Urine Color    YELLOW  


 


Urine Appearance    CLEAR  


 


Urine pH    5.5  (5.0-8.0)  


 


Ur Specific Gravity    1.015  (1.001-1.035)  


 


Urine Protein    NEGATIVE  (NEGATIVE)  mg/dL


 


Urine Glucose (UA)    NEGATIVE  (NEGATIVE)  mg/dL


 


Urine Ketones    NEGATIVE  (NEGATIVE)  mg/dL


 


Urine Occult Blood    TRACE-LYSED  (NEGATIVE)  


 


Urine Nitrite    NEGATIVE  (NEGATIVE)  


 


Urine Bilirubin    NEGATIVE  (NEGATIVE)  


 


Urine Urobilinogen    0.2  (<2.0)  EU/dL


 


Ur Leukocyte Esterase    NEGATIVE  (NEGATIVE)  


 


Urine RBC    0-1  (0-2/HPF)  


 


Urine WBC    0-1  (0-5/HPF)  


 


Ur Epithelial Cells    RARE  (NONE-FEW)  


 


Urine Bacteria    RARE  (NEGATIVE)  











Result Diagrams: 


 18 13:11





 18 13:11


Problem List Initiated/Reviewed/Updated: Yes


Orders Last 24hrs: 


 Active Orders 24 hr











 Category Date Time Status


 


 Patient Status [ADT] Routine ADT  18 15:33 Active


 


 Oxygen Therapy [RC] PRN Care  18 15:33 Active


 


 VTE/DVT Education [RC] PER UNIT ROUTINE Care  18 15:33 Active


 


 Vital Signs [RC] Q4H Care  18 15:33 Active


 


 CBC WITH AUTO DIFF [HEME] AM Lab  18 05:11 Ordered


 


 CBC WITH AUTO DIFF [HEME] AM Lab  18 05:11 Ordered


 


 CBC WITH AUTO DIFF [HEME] AM Lab  18 05:11 Ordered


 


 COMPREHENSIVE METABOLIC PN,CMP [CHEM] AM Lab  18 05:11 Ordered


 


 COMPREHENSIVE METABOLIC PN,CMP [CHEM] AM Lab  18 05:11 Ordered


 


 COMPREHENSIVE METABOLIC PN,CMP [CHEM] AM Lab  18 05:11 Ordered


 


 UA W/MICROSCOPIC [URIN] Stat Lab  18 15:09 Ordered


 


 Brimonidine Tartrate Med  18 22:00 Active





 1 drop EYERT TID   


 


 Clopidogrel [Plavix] Med  18 09:00 Active





 75 mg PO DAILY   


 


 Diltiazem [Cardizem CD] Med  18 09:00 Active





 360 mg PO DAILY   


 


 Heparin Sodium Med  18 15:45 Active





 5,000 units SUBCUT Q12H   


 


 Metoprolol Succinate [Toprol XL] Med  18 09:00 Active





 25 mg PO DAILY   


 


 Ondansetron [Zofran] Med  18 15:33 Active





 4 mg IVPUSH Q4H PRN   


 


 Sodium Chloride 0.9% [Normal Saline] 1,000 ml Med  18 15:45 Active





 IV ASDIRECTED   


 


 Sodium Chloride 0.9% [Normal Saline] 500 ml Med  18 13:45 Active





 IV STAT   


 


 Sodium Chloride 0.9% [Saline Flush] Med  18 12:33 Active





 10 ml FLUSH ASDIRECTED PRN   


 


 Sodium Chloride 0.9% [Saline Flush] Med  18 12:33 Active





 2.5 ml FLUSH ASDIRECTED PRN   


 


 atorvaSTATin [Lipitor] Med  18 21:00 Active





 20 mg PO BEDTIME   


 


 Saline Lock Insert [OM.PC] Stat Ot  18 12:33 Ordered


 


 Sequential Compression Device [OM.PC] Per Unit Routine Ot  18 15:35 

Ordered


 


 Resuscitation Status Routine Resus Stat  18 15:33 Ordered








 Medication Orders





Atorvastatin Calcium (Lipitor)  20 mg PO BEDTIME MANE


Clopidogrel Bisulfate (Plavix)  75 mg PO DAILY MANE


Diltiazem HCl (Cardizem Cd)  360 mg PO DAILY MANE


Heparin Sodium (Porcine) (Heparin Sodium)  5,000 units SUBCUT Q12H MANE


Sodium Chloride (Normal Saline)  500 mls @ 999 mls/hr IV STAT MANE


Sodium Chloride (Normal Saline)  1,000 mls @ 125 mls/hr IV ASDIRECTED MANE


Metoprolol Succinate (Toprol Xl)  25 mg PO DAILY MANE


Non-Formulary Medication (Brimonidine Tartrate)  1 drop EYERT TID MANE


Ondansetron HCl (Zofran)  4 mg IVPUSH Q4H PRN


   PRN Reason: Nausea


Sodium Chloride (Saline Flush)  10 ml FLUSH ASDIRECTED PRN


   PRN Reason: Keep Vein Open


Sodium Chloride (Saline Flush)  2.5 ml FLUSH ASDIRECTED PRN


   PRN Reason: Keep Vein Open








Assessment/Plan Comment:: 


76 yo female with pmh of CAD, CVA, CKD, and CHF admitted for dehydration and 

renal failure.  We will hydrate with IV fluids and monitor renal function. 

Francisco catheter was placed for strict Is/Os.  Holding antihypertensive and 

diuretic medications due to low blood pressures.  I spoke with Dejan Zheng (

[phone]) patient's son who is in agreement with treatment plan.  Son does 

not want to make any decision about dialysis today but would like to see how 

she responds to hydration.

## 2018-05-31 NOTE — CT
CT scan of the abdomen and pelvis

 

Clinical history: Pain

 

There is an colon none.

 

Findings: Lung bases are clear of significant findings. There is been prior mediastinal surgery for b
ypass. There is dense calcification of all coronary arteries. There is moderate cardiomegaly. There i
s a cardiac pacer in place.

 

The upper abdomen shows a homogeneous liver normal spleen and gallbladder with small layering calculi
. No pericholecystic fluid. Pancreas is atrophic but otherwise normal. Kidneys are nonobstructed.

 

Scanning through the low abdomen into the pelvis demonstrates diverticulosis of the sigmoid colon wit
hout pericolonic diverticulitis.

 

The uterus is in situ and contains intraluminal air as well as air extending into the vaginal vault. 
This is an unusual finding in a patient in this age group. This could be infected. There are calcific
ations in the wall of the uterus which is not unusual. There is no pelvic fluid collection or mass.

 

Impression: Intraluminal air within the uterus. Etiology indeterminate. There is contiguous diverticu
losis without pericolonic finding to suggest diverticulitis. It would be most unusual for diverticula
r disease to erode the uterus although not impossible. Cholelithiasis. No other intra-abdominal intra
peritoneal process.

## 2018-05-31 NOTE — CT
CT brain scan

 

Clinical history: Head pain

 

Comparison November 20, 2017

 

Findings: Again seen is prominence of the ventricles and sulci consistent with age and associated haz
y low density throughout the centrum semiovale of the white matter of both hemispheres consistent wit
h small vessel microvascular ischemic changes of aging. This is slightly more pronounced than on prio
r exam. There is no acute mass edema hemorrhage or space-occupying abnormality. The bony calvarium is
 normal and the nasal sinuses and mastoids are normally aerated.

 

Impression: Aging brain with no acute findings.

## 2018-06-01 NOTE — PCM.PN
- General Info


Date of Service: 06/01/18





- Review of Systems


Systems Review Comment:: 





denies any pain, much more awake today





- Patient Data


Vitals - Most Recent: 


 Last Vital Signs











Temp  36.7 C   06/01/18 04:27


 


Pulse  71   06/01/18 04:27


 


Resp  17   06/01/18 04:27


 


BP  100/61   06/01/18 04:27


 


Pulse Ox  94 L  06/01/18 04:27











Weight - Most Recent: 179.2 kg


I&O - Last 24 Hours: 


 Intake & Output











 05/31/18 06/01/18 06/01/18





 22:59 06:59 14:59


 


Intake Total  1635 


 


Output Total  800 


 


Balance  835 











Lab Results Last 24 Hours: 


 Laboratory Results - last 24 hr











  05/31/18 05/31/18 05/31/18 Range/Units





  13:11 13:11 15:09 


 


WBC  8.70    (4.0-11.0)  K/uL


 


RBC  3.59 L    (4.30-5.90)  M/uL


 


Hgb  10.9 L    (12.0-16.0)  g/dL


 


Hct  33.0 L    (36.0-46.0)  %


 


MCV  91.9    (80.0-98.0)  fL


 


MCH  30.4    (27.0-32.0)  pg


 


MCHC  33.0    (31.0-37.0)  g/dL


 


RDW Std Deviation  49.8    (28.0-62.0)  fl


 


RDW Coeff of Jason  15    (11.0-15.0)  %


 


Plt Count  209    (150-400)  K/uL


 


MPV  10.10    (7.40-12.00)  fL


 


Neut % (Auto)  67.2    (48.0-80.0)  %


 


Lymph % (Auto)  21.7    (16.0-40.0)  %


 


Mono % (Auto)  6.0    (0.0-15.0)  %


 


Eos % (Auto)  4.6    (0.0-7.0)  %


 


Baso % (Auto)  0.5    (0.0-1.5)  %


 


Neut # (Auto)  5.9 H    (1.4-5.7)  K/uL


 


Lymph # (Auto)  1.9    (0.6-2.4)  K/uL


 


Mono # (Auto)  0.5    (0.0-0.8)  K/uL


 


Eos # (Auto)  0.4    (0.0-0.7)  K/uL


 


Baso # (Auto)  0.0    (0.0-0.1)  K/uL


 


Nucleated RBC %  0.0    /100WBC


 


Nucleated RBCs #  0    K/uL


 


Sodium   135 L   (136-145)  mmol/L


 


Potassium   4.6   (3.5-5.1)  mmol/L


 


Chloride   96 L   ()  mmol/L


 


Carbon Dioxide   26.5   (21.0-32.0)  mmol/L


 


BUN   113 H   (7.0-18.0)  mg/dL


 


Creatinine   6.0 H   (0.6-1.0)  mg/dL


 


Est Cr Clr Drug Dosing   5.46   mL/min


 


Estimated GFR (MDRD)   6.8   ml/min


 


Glucose   120 H   ()  mg/dL


 


Calcium   8.4 L   (8.5-10.1)  mg/dL


 


Total Bilirubin   0.3   (0.2-1.0)  mg/dL


 


AST   25   (15-37)  IU/L


 


ALT   17   (14-63)  IU/L


 


Alkaline Phosphatase   111   ()  U/L


 


Total Protein   6.9   (6.4-8.2)  g/dL


 


Albumin   3.4   (3.4-5.0)  g/dL


 


Globulin   3.5   (2.0-3.5)  g/dL


 


Albumin/Globulin Ratio   1.0 L   (1.3-2.8)  


 


Urine Color    YELLOW  


 


Urine Appearance    CLEAR  


 


Urine pH    5.5  (5.0-8.0)  


 


Ur Specific Gravity    1.015  (1.001-1.035)  


 


Urine Protein    NEGATIVE  (NEGATIVE)  mg/dL


 


Urine Glucose (UA)    NEGATIVE  (NEGATIVE)  mg/dL


 


Urine Ketones    NEGATIVE  (NEGATIVE)  mg/dL


 


Urine Occult Blood    TRACE-LYSED  (NEGATIVE)  


 


Urine Nitrite    NEGATIVE  (NEGATIVE)  


 


Urine Bilirubin    NEGATIVE  (NEGATIVE)  


 


Urine Urobilinogen    0.2  (<2.0)  EU/dL


 


Ur Leukocyte Esterase    NEGATIVE  (NEGATIVE)  


 


Urine RBC    0-1  (0-2/HPF)  


 


Urine WBC    0-1  (0-5/HPF)  


 


Ur Epithelial Cells    RARE  (NONE-FEW)  


 


Urine Bacteria    RARE  (NEGATIVE)  














  06/01/18 06/01/18 Range/Units





  05:50 07:55 


 


WBC  8.44   (4.0-11.0)  K/uL


 


RBC  3.49 L   (4.30-5.90)  M/uL


 


Hgb  10.5 L   (12.0-16.0)  g/dL


 


Hct  32.1 L   (36.0-46.0)  %


 


MCV  92.0   (80.0-98.0)  fL


 


MCH  30.1   (27.0-32.0)  pg


 


MCHC  32.7   (31.0-37.0)  g/dL


 


RDW Std Deviation  49.3   (28.0-62.0)  fl


 


RDW Coeff of Jason  15   (11.0-15.0)  %


 


Plt Count  189   (150-400)  K/uL


 


MPV  10.40   (7.40-12.00)  fL


 


Neut % (Auto)  63.5   (48.0-80.0)  %


 


Lymph % (Auto)  25.4   (16.0-40.0)  %


 


Mono % (Auto)  5.9   (0.0-15.0)  %


 


Eos % (Auto)  5.0   (0.0-7.0)  %


 


Baso % (Auto)  0.2   (0.0-1.5)  %


 


Neut # (Auto)  5.4   (1.4-5.7)  K/uL


 


Lymph # (Auto)  2.1   (0.6-2.4)  K/uL


 


Mono # (Auto)  0.5   (0.0-0.8)  K/uL


 


Eos # (Auto)  0.4   (0.0-0.7)  K/uL


 


Baso # (Auto)  0.0   (0.0-0.1)  K/uL


 


Nucleated RBC %  0.0   /100WBC


 


Nucleated RBCs #  0   K/uL


 


Sodium   140  (136-145)  mmol/L


 


Potassium   4.1  (3.5-5.1)  mmol/L


 


Chloride   102  ()  mmol/L


 


Carbon Dioxide   27.9  (21.0-32.0)  mmol/L


 


BUN   99 H  (7.0-18.0)  mg/dL


 


Creatinine   4.4 H  (0.6-1.0)  mg/dL


 


Est Cr Clr Drug Dosing   7.45  mL/min


 


Estimated GFR (MDRD)   9.7  ml/min


 


Glucose   137 H  ()  mg/dL


 


Calcium   8.4 L  (8.5-10.1)  mg/dL


 


Total Bilirubin   0.2  (0.2-1.0)  mg/dL


 


AST   19  (15-37)  IU/L


 


ALT   17  (14-63)  IU/L


 


Alkaline Phosphatase   115  ()  U/L


 


Total Protein   6.6  (6.4-8.2)  g/dL


 


Albumin   3.3 L  (3.4-5.0)  g/dL


 


Globulin   3.3  (2.0-3.5)  g/dL


 


Albumin/Globulin Ratio   1.0 L  (1.3-2.8)  


 


Urine Color    


 


Urine Appearance    


 


Urine pH    (5.0-8.0)  


 


Ur Specific Gravity    (1.001-1.035)  


 


Urine Protein    (NEGATIVE)  mg/dL


 


Urine Glucose (UA)    (NEGATIVE)  mg/dL


 


Urine Ketones    (NEGATIVE)  mg/dL


 


Urine Occult Blood    (NEGATIVE)  


 


Urine Nitrite    (NEGATIVE)  


 


Urine Bilirubin    (NEGATIVE)  


 


Urine Urobilinogen    (<2.0)  EU/dL


 


Ur Leukocyte Esterase    (NEGATIVE)  


 


Urine RBC    (0-2/HPF)  


 


Urine WBC    (0-5/HPF)  


 


Ur Epithelial Cells    (NONE-FEW)  


 


Urine Bacteria    (NEGATIVE)  











Med Orders - Current: 


 Current Medications





Albuterol/Ipratropium (Duoneb 3.0-0.5 Mg/3 Ml)  3 ml NEB Q4HRRT PRN


   PRN Reason: Wheezing


   Last Admin: 06/01/18 06:55 Dose:  3 ml


Atorvastatin Calcium (Lipitor)  20 mg PO BEDTIME [facility]


   Last Admin: 05/31/18 20:37 Dose:  20 mg


Clopidogrel Bisulfate (Plavix)  75 mg PO DAILY [facility]


Heparin Sodium (Porcine) (Heparin Sodium)  5,000 units SUBCUT Q12H [facility]


   Last Admin: 06/01/18 04:21 Dose:  5,000 units


Sodium Chloride (Normal Saline)  500 mls @ 999 mls/hr IV STAT [facility]


Sodium Chloride (Normal Saline)  1,000 mls @ 125 mls/hr IV ASDIRECTED [facility]


   Last Admin: 06/01/18 03:02 Dose:  125 mls/hr


Non-Formulary Medication (Brimonidine Tartrate)  1 drop EYERT TID [facility]


   Last Admin: 06/01/18 05:55 Dose:  Not Given


Ondansetron HCl (Zofran)  4 mg IVPUSH Q4H PRN


   PRN Reason: Nausea


Sodium Chloride (Saline Flush)  10 ml FLUSH ASDIRECTED PRN


   PRN Reason: Keep Vein Open


   Last Admin: 05/31/18 16:24 Dose:  10 ml


Sodium Chloride (Saline Flush)  2.5 ml FLUSH ASDIRECTED PRN


   PRN Reason: Keep Vein Open





Discontinued Medications





Diltiazem HCl (Cardizem Cd)  360 mg PO DAILY MANE


Metoprolol Succinate (Toprol Xl)  25 mg PO DAILY MANE











- Exam


General: Alert, Cooperative


Lungs: Clear to Auscultation, Normal Respiratory Effort


Cardiovascular: Regular Rate, Regular Rhythm


GI/Abdominal Exam: Soft, Non-Tender, No Distention


Extremities: Non-Tender, No Pedal Edema


Skin: Warm, Dry, Intact


Neurological: No New Focal Deficit





- Problem List Review


Problem List Initiated/Reviewed/Updated: Yes





- My Orders


Last 24 Hours: 


My Active Orders





05/31/18 15:33


Patient Status [ADT] Routine 


Oxygen Therapy [RC] PRN 


Vital Signs [RC] Q4H 


Ondansetron [Zofran]   4 mg IVPUSH Q4H PRN 


Resuscitation Status Routine 





05/31/18 15:35


Sequential Compression Device [OM.PC] Per Unit Routine 





05/31/18 15:45


Heparin Sodium   5,000 units SUBCUT Q12H 


Sodium Chloride 0.9% [Normal Saline] 1,000 ml IV ASDIRECTED 





05/31/18 21:00


atorvaSTATin [Lipitor]   20 mg PO BEDTIME 





05/31/18 22:00


Brimonidine Tartrate   1 drop EYERT TID 





06/01/18 06:29


Albuterol/Ipratropium [DuoNeb 3.0-0.5 MG/3 ML]   3 ml NEB Q4HRRT PRN 





06/01/18 06:30


RT Aerosol Therapy [RC] ASDIRECTED 





06/01/18 09:00


Clopidogrel [Plavix]   75 mg PO DAILY 





06/01/18 Dinner


Renal Non-Dialysis Diet [DIET] 





06/02/18 05:11


CBC WITH AUTO DIFF [HEME] AM 


COMPREHENSIVE METABOLIC PN,CMP [CHEM] AM 





06/03/18 05:11


CBC WITH AUTO DIFF [HEME] AM 


COMPREHENSIVE METABOLIC PN,CMP [CHEM] AM 














- Plan


Plan:: 


78 yo female with pmh of CAD, CVA, CKD, and CHF admitted for dehydration and 

renal failure.  


PINKY: creatinine improved to 4.4, patient more alert today, will continue IV 

fluids.

## 2018-06-02 NOTE — PCM.PN
- General Info


Date of Service: 06/02/18





- Review of Systems


Systems Review Comment:: 





patient was agitatated last night, complained of leg pain, and wheezing





- Patient Data


Vitals - Most Recent: 


 Last Vital Signs











Temp  37.1 C   06/02/18 03:24


 


Pulse  82   06/02/18 03:24


 


Resp  18   06/02/18 03:24


 


BP  114/71   06/02/18 03:24


 


Pulse Ox  95   06/02/18 03:24











Weight - Most Recent: 179.2 kg


I&O - Last 24 Hours: 


 Intake & Output











 06/01/18 06/02/18 06/02/18





 22:59 06:59 14:59


 


Intake Total  2100 


 


Output Total  2500 


 


Balance  -400 











Lab Results Last 24 Hours: 


 Laboratory Results - last 24 hr











  06/02/18 06/02/18 Range/Units





  05:20 05:20 


 


WBC  9.36   (4.0-11.0)  K/uL


 


RBC  3.46 L   (4.30-5.90)  M/uL


 


Hgb  10.3 L   (12.0-16.0)  g/dL


 


Hct  31.7 L   (36.0-46.0)  %


 


MCV  91.6   (80.0-98.0)  fL


 


MCH  29.8   (27.0-32.0)  pg


 


MCHC  32.5   (31.0-37.0)  g/dL


 


RDW Std Deviation  48.6   (28.0-62.0)  fl


 


RDW Coeff of Jason  15   (11.0-15.0)  %


 


Plt Count  202   (150-400)  K/uL


 


MPV  10.20   (7.40-12.00)  fL


 


Neut % (Auto)  72.3   (48.0-80.0)  %


 


Lymph % (Auto)  18.3   (16.0-40.0)  %


 


Mono % (Auto)  5.8   (0.0-15.0)  %


 


Eos % (Auto)  3.2   (0.0-7.0)  %


 


Baso % (Auto)  0.4   (0.0-1.5)  %


 


Neut # (Auto)  6.8 H   (1.4-5.7)  K/uL


 


Lymph # (Auto)  1.7   (0.6-2.4)  K/uL


 


Mono # (Auto)  0.5   (0.0-0.8)  K/uL


 


Eos # (Auto)  0.3   (0.0-0.7)  K/uL


 


Baso # (Auto)  0.0   (0.0-0.1)  K/uL


 


Nucleated RBC %  0.0   /100WBC


 


Nucleated RBCs #  0   K/uL


 


Sodium   141  (136-145)  mmol/L


 


Potassium   4.3  (3.5-5.1)  mmol/L


 


Chloride   106  ()  mmol/L


 


Carbon Dioxide   25.7  (21.0-32.0)  mmol/L


 


BUN   75 H  (7.0-18.0)  mg/dL


 


Creatinine   2.7 H  (0.6-1.0)  mg/dL


 


Est Cr Clr Drug Dosing   12.14  mL/min


 


Estimated GFR (MDRD)   17.0  ml/min


 


Glucose   126 H  ()  mg/dL


 


Calcium   8.7  (8.5-10.1)  mg/dL


 


Total Bilirubin   0.3  (0.2-1.0)  mg/dL


 


AST   17  (15-37)  IU/L


 


ALT   15  (14-63)  IU/L


 


Alkaline Phosphatase   92  ()  U/L


 


Total Protein   6.7  (6.4-8.2)  g/dL


 


Albumin   3.0 L  (3.4-5.0)  g/dL


 


Globulin   3.7 H  (2.0-3.5)  g/dL


 


Albumin/Globulin Ratio   0.8 L  (1.3-2.8)  











Med Orders - Current: 


 Current Medications





Acetaminophen (Tylenol Extra Strength)  500 mg PO TID Pending sale to [facility]


   Last Admin: 06/02/18 09:22 Dose:  500 mg


Albuterol/Ipratropium (Duoneb 3.0-0.5 Mg/3 Ml)  3 ml NEB Q4HRRT PRN


   PRN Reason: Wheezing


   Last Admin: 06/02/18 07:54 Dose:  3 ml


Atorvastatin Calcium (Lipitor)  20 mg PO BEDTIME Pending sale to [facility]


   Last Admin: 06/01/18 20:21 Dose:  20 mg


Clopidogrel Bisulfate (Plavix)  75 mg PO DAILY Pending sale to [facility]


   Last Admin: 06/02/18 09:16 Dose:  75 mg


Heparin Sodium (Porcine) (Heparin Sodium)  5,000 units SUBCUT Q12H Pending sale to [facility]


   Last Admin: 06/02/18 03:52 Dose:  5,000 units


Sodium Chloride (Normal Saline)  500 mls @ 999 mls/hr IV STAT Pending sale to [facility]


Ondansetron HCl (Zofran)  4 mg IVPUSH Q4H PRN


   PRN Reason: Nausea


Donepezil Hcl [ (Aricept] 23 Mg)  1 each PO DAILY Pending sale to [facility]


   Last Admin: 06/02/18 09:26 Dose:  1 each


Brimonidine Tartrate (0.1% 1 Drop)  1 each EYERT TID Pending sale to [facility]


Quetiapine Fumarate (Seroquel)  100 mg PO BID Pending sale to [facility]


   Last Admin: 06/02/18 09:16 Dose:  100 mg


Sodium Chloride (Saline Flush)  10 ml FLUSH ASDIRECTED PRN


   PRN Reason: Keep Vein Open


   Last Admin: 05/31/18 16:24 Dose:  10 ml


Sodium Chloride (Saline Flush)  2.5 ml FLUSH ASDIRECTED PRN


   PRN Reason: Keep Vein Open





Discontinued Medications





Acetaminophen (Tylenol Extra Strength)  500 mg PO TID Pending sale to [facility]


Diltiazem HCl (Cardizem Cd)  360 mg PO DAILY Pending sale to [facility]


Sodium Chloride (Normal Saline)  1,000 mls @ 125 mls/hr IV ASDIRECTED Pending sale to [facility]


   Last Admin: 06/02/18 03:22 Dose:  125 mls/hr


Metoprolol Succinate (Toprol Xl)  25 mg PO DAILY Pending sale to [facility]


Non-Formulary Medication (Brimonidine Tartrate)  1 drop EYERT TID Pending sale to [facility]


   Last Admin: 06/02/18 05:23 Dose:  Not Given











- Exam


General: Cooperative, No Acute Distress


Lungs: Clear to Auscultation, Normal Respiratory Effort


Cardiovascular: Regular Rate, Regular Rhythm


GI/Abdominal Exam: Soft, Non-Tender, No Distention


Extremities: Non-Tender, No Pedal Edema


Skin: Warm, Dry, Intact


Neurological: No New Focal Deficit





- Problem List Review


Problem List Initiated/Reviewed/Updated: Yes





- My Orders


Last 24 Hours: 


My Active Orders





06/01/18 Dinner


Renal Non-Dialysis Diet [DIET] 





06/02/18 09:00


Patient's Own Medication [Ptom]   1 each PO DAILY 


QUEtiapine [SEROquel]   100 mg PO BID 





06/02/18 09:16


Acetaminophen [Tylenol Extra Strength]   500 mg PO TID 





06/02/18 14:00


Patient's Own Medication [Ptom]   1 each EYERT TID 





06/03/18 05:11


CBC WITH AUTO DIFF [HEME] AM 


COMPREHENSIVE METABOLIC PN,CMP [CHEM] AM 














- Plan


Plan:: 


78 yo female with pmh of CAD, CVA, CKD, and CHF admitted for dehydration and 

renal failure.  


PINKY: creatinine improved to 2.7, holding antihypertensives as blood pressure 

has been low


resumed psych medications of seroquil today

## 2018-06-03 NOTE — PCM.PN
- General Info


Date of Service: 06/03/18





- Review of Systems


Systems Review Comment:: 


had abdominal pain last night, no pain this morning, reports leg sore.





- Patient Data


Vitals - Most Recent: 


 Last Vital Signs











Temp  37.0 C   06/03/18 07:00


 


Pulse  97   06/03/18 07:00


 


Resp  16   06/03/18 07:00


 


BP  134/65   06/03/18 07:00


 


Pulse Ox  95   06/03/18 07:00











Weight - Most Recent: 179.2 kg


I&O - Last 24 Hours: 


 Intake & Output











 06/02/18 06/03/18 06/03/18





 22:59 06:59 14:59


 


Intake Total 590 333 


 


Output Total 900 900 


 


Balance -310 -567 











Lab Results Last 24 Hours: 


 Laboratory Results - last 24 hr











  06/03/18 06/03/18 Range/Units





  05:49 05:49 


 


WBC  11.87 H   (4.0-11.0)  K/uL


 


RBC  3.79 L   (4.30-5.90)  M/uL


 


Hgb  11.3 L   (12.0-16.0)  g/dL


 


Hct  35.0 L   (36.0-46.0)  %


 


MCV  92.3   (80.0-98.0)  fL


 


MCH  29.8   (27.0-32.0)  pg


 


MCHC  32.3   (31.0-37.0)  g/dL


 


RDW Std Deviation  50.6   (28.0-62.0)  fl


 


RDW Coeff of Jason  15   (11.0-15.0)  %


 


Plt Count  184   (150-400)  K/uL


 


MPV  10.30   (7.40-12.00)  fL


 


Neut % (Auto)  79.3   (48.0-80.0)  %


 


Lymph % (Auto)  11.2 L   (16.0-40.0)  %


 


Mono % (Auto)  7.1   (0.0-15.0)  %


 


Eos % (Auto)  2.1   (0.0-7.0)  %


 


Baso % (Auto)  0.3   (0.0-1.5)  %


 


Neut # (Auto)  9.4 H   (1.4-5.7)  K/uL


 


Lymph # (Auto)  1.3   (0.6-2.4)  K/uL


 


Mono # (Auto)  0.8   (0.0-0.8)  K/uL


 


Eos # (Auto)  0.3   (0.0-0.7)  K/uL


 


Baso # (Auto)  0.0   (0.0-0.1)  K/uL


 


Nucleated RBC %  0.0   /100WBC


 


Nucleated RBCs #  0   K/uL


 


Sodium   140  (136-145)  mmol/L


 


Potassium   4.5  (3.5-5.1)  mmol/L


 


Chloride   105  ()  mmol/L


 


Carbon Dioxide   26.2  (21.0-32.0)  mmol/L


 


BUN   56 H  (7.0-18.0)  mg/dL


 


Creatinine   2.2 H  (0.6-1.0)  mg/dL


 


Est Cr Clr Drug Dosing   14.89  mL/min


 


Estimated GFR (MDRD)   21.5  ml/min


 


Glucose   157 H  ()  mg/dL


 


Calcium   9.4  (8.5-10.1)  mg/dL


 


Total Bilirubin   0.4  (0.2-1.0)  mg/dL


 


AST   17  (15-37)  IU/L


 


ALT   15  (14-63)  IU/L


 


Alkaline Phosphatase   97  ()  U/L


 


Total Protein   7.1  (6.4-8.2)  g/dL


 


Albumin   3.0 L  (3.4-5.0)  g/dL


 


Globulin   4.1 H  (2.0-3.5)  g/dL


 


Albumin/Globulin Ratio   0.7 L  (1.3-2.8)  











Med Orders - Current: 


 Current Medications





Acetaminophen (Tylenol Extra Strength)  500 mg PO TID [facility]


   Last Admin: 06/03/18 09:12 Dose:  500 mg


Albuterol/Ipratropium (Duoneb 3.0-0.5 Mg/3 Ml)  3 ml NEB Q4HRRT PRN


   PRN Reason: Wheezing


   Last Admin: 06/02/18 21:54 Dose:  3 ml


Atorvastatin Calcium (Lipitor)  20 mg PO BEDTIME [facility]


   Last Admin: 06/02/18 21:49 Dose:  20 mg


Clopidogrel Bisulfate (Plavix)  75 mg PO DAILY [facility]


   Last Admin: 06/03/18 09:11 Dose:  75 mg


Heparin Sodium (Porcine) (Heparin Sodium)  5,000 units SUBCUT Q12H [facility]


   Last Admin: 06/03/18 04:34 Dose:  5,000 units


Sodium Chloride (Normal Saline)  500 mls @ 999 mls/hr IV STAT [facility]


Ondansetron HCl (Zofran)  4 mg IVPUSH Q4H PRN


   PRN Reason: Nausea


Donepezil Hcl [ (Aricept] 23 Mg)  1 each PO DAILY [facility]


   Last Admin: 06/03/18 09:16 Dose:  1 each


Brimonidine Tartrate (0.1% 1 Drop)  1 each EYERT TID [facility]


   Last Admin: 06/03/18 09:16 Dose:  1 each


Quetiapine Fumarate (Seroquel)  100 mg PO BID [facility]


   Last Admin: 06/03/18 09:13 Dose:  100 mg


Sodium Chloride (Saline Flush)  10 ml FLUSH ASDIRECTED PRN


   PRN Reason: Keep Vein Open


   Last Admin: 05/31/18 16:24 Dose:  10 ml


Sodium Chloride (Saline Flush)  2.5 ml FLUSH ASDIRECTED PRN


   PRN Reason: Keep Vein Open


Tramadol HCl (Ultram)  50 mg PO Q6H PRN


   PRN Reason: Pain


   Last Admin: 06/03/18 04:29 Dose:  50 mg





Discontinued Medications





Acetaminophen (Tylenol Extra Strength)  500 mg PO TID [facility]


Diltiazem HCl (Cardizem Cd)  360 mg PO DAILY [facility]


Sodium Chloride (Normal Saline)  1,000 mls @ 125 mls/hr IV ASDIRECTED [facility]


   Last Admin: 06/02/18 03:22 Dose:  125 mls/hr


Metoprolol Succinate (Toprol Xl)  25 mg PO DAILY [facility]


Non-Formulary Medication (Brimonidine Tartrate)  1 drop EYERT TID [facility]


   Last Admin: 06/02/18 05:23 Dose:  Not Given











- Exam


General: Alert, Oriented


Lungs: Clear to Auscultation, Normal Respiratory Effort


GI/Abdominal Exam: Normal Bowel Sounds, Soft, Non-Tender, No Distention


Extremities: Non-Tender, No Pedal Edema


Skin: Warm, Dry, Intact


Neurological: No New Focal Deficit





- Problem List Review


Problem List Initiated/Reviewed/Updated: Yes





- My Orders


Last 24 Hours: 


My Active Orders





06/02/18 09:00


Patient's Own Medication [Ptom]   1 each PO DAILY 


QUEtiapine [SEROquel]   100 mg PO BID 





06/02/18 09:16


Acetaminophen [Tylenol Extra Strength]   500 mg PO TID 





06/02/18 14:00


Patient's Own Medication [Ptom]   1 each EYERT TID 





06/03/18 04:19


traMADol [Ultram]   50 mg PO Q6H PRN 














- Plan


Plan:: 


78 yo female with pmh of CAD, CVA, CKD, and CHF admitted for dehydration and 

renal failure.  


PINKY: creatinine improved to 2.2, holding antihypertensives as blood pressure 

had been low


Dispo: likely discharge to [place] tomorrow.

## 2018-06-04 NOTE — PCM.PN
- General Info


Date of Service: 06/04/18


Admission Dx/Problem (Free Text): 


 Admission Diagnosis/Problem





Admission Diagnosis/Problem      Dehydration








Subjective Update: 





Feeling ok today, slightly anxious. Denies any pain, unless you touch her legs. 

No chest pain or SOB. No other concerns, "I want to go home."


Functional Status: Reports: Pain Controlled, Tolerating Diet, Urinating (has 

love)





- Review of Systems


General: Reports: No Symptoms.  Denies: Fever, Weakness, Fatigue, Malaise


HEENT: Reports: No Symptoms.  Denies: Headaches, Sore Throat, Rhinitis, Visual 

Changes


Pulmonary: Reports: No Symptoms.  Denies: Shortness of Breath


Cardiovascular: Reports: No Symptoms.  Denies: Chest Pain


Gastrointestinal: Reports: No Symptoms.  Denies: Abdominal Pain, Nausea, 

Vomiting


Genitourinary: Reports: No Symptoms.  Denies: Dysuria, Frequency, Burning


Musculoskeletal: Reports: No Symptoms


Skin: Reports: No Symptoms


Neurological: Reports: No Symptoms


Psychiatric: Reports: No Symptoms





- Patient Data


Vitals - Most Recent: 


 Last Vital Signs











Temp  97.3 F   06/04/18 07:55


 


Pulse  89   06/04/18 07:55


 


Resp  20   06/04/18 07:55


 


BP  92/50 L  06/04/18 09:16


 


Pulse Ox  92 L  06/04/18 07:55











Weight - Most Recent: 179.2 kg


I&O - Last 24 Hours: 


 Intake & Output











 06/03/18 06/04/18 06/04/18





 22:59 06:59 14:59


 


Intake Total 772 250 


 


Output Total 525 420 


 


Balance 247 -170 











Med Orders - Current: 


 Current Medications





Acetaminophen (Tylenol Extra Strength)  500 mg PO TID [facility]


   Last Admin: 06/04/18 06:19 Dose:  500 mg


Albuterol/Ipratropium (Duoneb 3.0-0.5 Mg/3 Ml)  3 ml NEB Q4HRRT PRN


   PRN Reason: Wheezing


   Last Admin: 06/02/18 21:54 Dose:  3 ml


Atorvastatin Calcium (Lipitor)  20 mg PO BEDTIME [facility]


   Last Admin: 06/03/18 20:09 Dose:  20 mg


Clopidogrel Bisulfate (Plavix)  75 mg PO DAILY [facility]


   Last Admin: 06/04/18 08:02 Dose:  75 mg


Heparin Sodium (Porcine) (Heparin Sodium)  5,000 units SUBCUT Q12H [facility]


   Last Admin: 06/04/18 03:20 Dose:  5,000 units


Sodium Chloride (Normal Saline)  500 mls @ 999 mls/hr IV STAT [facility]


Nystatin (Nystop)  15 gm TOP TID PRN


   PRN Reason: skin care


Ondansetron HCl (Zofran)  4 mg IVPUSH Q4H PRN


   PRN Reason: Nausea


Donepezil Hcl [ (Aricept] 23 Mg)  1 each PO DAILY [facility]


   Last Admin: 06/04/18 08:02 Dose:  1 each


Brimonidine Tartrate (0.1% 1 Drop)  1 each EYERT TID [facility]


   Last Admin: 06/04/18 06:19 Dose:  1 each


Quetiapine Fumarate (Seroquel)  100 mg PO BID [facility]


   Last Admin: 06/04/18 08:02 Dose:  100 mg


Sodium Chloride (Saline Flush)  10 ml FLUSH ASDIRECTED PRN


   PRN Reason: Keep Vein Open


   Last Admin: 05/31/18 16:24 Dose:  10 ml


Sodium Chloride (Saline Flush)  2.5 ml FLUSH ASDIRECTED PRN


   PRN Reason: Keep Vein Open


Tramadol HCl (Ultram)  50 mg PO Q6H PRN


   PRN Reason: Pain


   Last Admin: 06/03/18 20:08 Dose:  50 mg





Discontinued Medications





Acetaminophen (Tylenol Extra Strength)  500 mg PO TID [facility]


Diltiazem HCl (Cardizem Cd)  360 mg PO DAILY [facility]


Sodium Chloride (Normal Saline)  1,000 mls @ 125 mls/hr IV ASDIRECTED [facility]


   Last Admin: 06/02/18 03:22 Dose:  125 mls/hr


Metoprolol Succinate (Toprol Xl)  25 mg PO DAILY [facility]


Non-Formulary Medication (Brimonidine Tartrate)  1 drop EYERT TID [facility]


   Last Admin: 06/02/18 05:23 Dose:  Not Given











- Exam


General: Alert, Cooperative, No Acute Distress


Neck: Supple


Lungs: Clear to Auscultation, Normal Respiratory Effort


Cardiovascular: Regular Rate, Regular Rhythm


GI/Abdominal Exam: Normal Bowel Sounds, Soft, Non-Tender, No Distention


Extremities: Normal Inspection, Non-Tender, No Pedal Edema, Normal Capillary 

Refill


Neurological: No New Focal Deficit


Psy/Mental Status: Anxious





- Problem List & Annotations


(1) Dehydration


SNOMED Code(s): 81534993


   Code(s): E86.0 - DEHYDRATION   Status: Acute   Current Visit: Yes   





(2) Acute on chronic renal insufficiency


SNOMED Code(s): 692793582


   Code(s): N28.9 - DISORDER OF KIDNEY AND URETER, UNSPECIFIED; N18.9 - CHRONIC 

KIDNEY DISEASE, UNSPECIFIED   Status: Acute   Current Visit: No   





(3) CVA (cerebral vascular accident)


SNOMED Code(s): 652280704


   Code(s): I63.9 - CEREBRAL INFARCTION, UNSPECIFIED   Status: Chronic   

Priority: High   Current Visit: No   


Qualifiers: 


   CVA mechanism: unspecified   Qualified Code(s): I63.9 - Cerebral infarction, 

unspecified   





(4) Chronic kidney disease


SNOMED Code(s): 010705993


   Code(s): N18.9 - CHRONIC KIDNEY DISEASE, UNSPECIFIED   Status: Chronic   

Current Visit: No   





(5) Congestive heart failure


SNOMED Code(s): 40143031


   Code(s): I50.9 - HEART FAILURE, UNSPECIFIED   Status: Chronic   Current Visit

: No   





(6) PVD (peripheral vascular disease)


SNOMED Code(s): 832170429


   Code(s): I73.9 - PERIPHERAL VASCULAR DISEASE, UNSPECIFIED   Status: Chronic 

  Current Visit: No   





(7) Pacemaker


SNOMED Code(s): 204689771


   Code(s): Z95.0 - PRESENCE OF CARDIAC PACEMAKER   Status: Chronic   Current 

Visit: No   





(8) Dementia


SNOMED Code(s): 20213385


   Code(s): F03.90 - UNSPECIFIED DEMENTIA WITHOUT BEHAVIORAL DISTURBANCE   

Status: Chronic   Current Visit: Yes   





(9) CAD (coronary artery disease)


SNOMED Code(s): 52686947


   Code(s): I25.10 - ATHSCL HEART DISEASE OF NATIVE CORONARY ARTERY W/O ANG 

PCTRS   Status: Chronic   Current Visit: Yes   





- Problem List Review


Problem List Initiated/Reviewed/Updated: Yes





- My Orders


Last 24 Hours: 


My Active Orders





06/04/18 09:33


BMP [BASIC METABOLIC PANEL,BMP] [CHEM] Routine 


CBC WITH AUTO DIFF [HEME] Routine 














- Plan


Plan:: 


76 yo female with pmh of CAD, CVA, CKD, and CHF admitted for dehydration and 

renal failure.  








1. PINKY: Cr 2.4 today. BP remains low. All antihypertensives including diuretics 

on hold. Will continue to monitor today due to low BPs. Encourage oral intake. 

Leukocytosis increasing slightly. Afebrile. Will check Ua. 





2. CAD: Stable. Continue statin and Plavix





VTE prophylaxis: Heparin.





Dispo: Possible discharge to [place] tomorrow.

## 2018-06-05 NOTE — PCM.PN
- General Info


Date of Service: 06/05/18


Admission Dx/Problem (Free Text): 


 Admission Diagnosis/Problem





Admission Diagnosis/Problem      Dehydration








Subjective Update: 





Feeling ok this morning. Legs hurt intermittently or when anyone touches her, 

including her L arm. Denies chest pain or SOB. No other complaints. 


Functional Status: Reports: Pain Controlled, Tolerating Diet, Urinating





- Review of Systems


General: Reports: No Symptoms.  Denies: Fever, Weakness, Fatigue, Malaise


HEENT: Reports: No Symptoms.  Denies: Headaches, Sore Throat, Visual Changes


Pulmonary: Reports: No Symptoms.  Denies: Shortness of Breath


Cardiovascular: Reports: No Symptoms.  Denies: Chest Pain, Palpitations


Gastrointestinal: Reports: No Symptoms.  Denies: Abdominal Pain, Nausea, 

Vomiting


Genitourinary: Reports: No Symptoms.  Denies: Dysuria, Frequency, Burning


Musculoskeletal: Reports: Leg Pain (bilateral to the smallest touch of skin)


Skin: Reports: No Symptoms


Neurological: Reports: No Symptoms


Psychiatric: Reports: No Symptoms





- Patient Data


Vitals - Most Recent: 


 Last Vital Signs











Temp  98.9 F   06/05/18 04:00


 


Pulse  93   06/05/18 04:00


 


Resp  18   06/05/18 04:00


 


BP  134/60   06/05/18 04:00


 


Pulse Ox  18 L  06/05/18 04:00











Weight - Most Recent: 179.2 kg


I&O - Last 24 Hours: 


 Intake & Output











 06/04/18 06/05/18 06/05/18





 22:59 06:59 14:59


 


Intake Total 170 1000 


 


Output Total 200  


 


Balance -30 1000 











Lab Results Last 24 Hours: 


 Laboratory Results - last 24 hr











  06/04/18 06/04/18 06/04/18 Range/Units





  09:55 09:55 10:56 


 


WBC  12.41 H    (4.0-11.0)  K/uL


 


RBC  3.47 L    (4.30-5.90)  M/uL


 


Hgb  10.4 L    (12.0-16.0)  g/dL


 


Hct  32.1 L    (36.0-46.0)  %


 


MCV  92.5    (80.0-98.0)  fL


 


MCH  30.0    (27.0-32.0)  pg


 


MCHC  32.4    (31.0-37.0)  g/dL


 


RDW Std Deviation  50.6    (28.0-62.0)  fl


 


RDW Coeff of Jason  15    (11.0-15.0)  %


 


Plt Count  168    (150-400)  K/uL


 


MPV  10.20    (7.40-12.00)  fL


 


Neut % (Auto)  76.3    (48.0-80.0)  %


 


Lymph % (Auto)  15.1 L    (16.0-40.0)  %


 


Mono % (Auto)  7.2    (0.0-15.0)  %


 


Eos % (Auto)  1.2    (0.0-7.0)  %


 


Baso % (Auto)  0.2    (0.0-1.5)  %


 


Neut # (Auto)  9.5 H    (1.4-5.7)  K/uL


 


Lymph # (Auto)  1.9    (0.6-2.4)  K/uL


 


Mono # (Auto)  0.9 H    (0.0-0.8)  K/uL


 


Eos # (Auto)  0.2    (0.0-0.7)  K/uL


 


Baso # (Auto)  0.0    (0.0-0.1)  K/uL


 


Nucleated RBC %  0.0    /100WBC


 


Nucleated RBCs #  0    K/uL


 


Sodium   138   (136-145)  mmol/L


 


Potassium   4.8   (3.5-5.1)  mmol/L


 


Chloride   103   ()  mmol/L


 


Carbon Dioxide   28.2   (21.0-32.0)  mmol/L


 


BUN   50 H   (7.0-18.0)  mg/dL


 


Creatinine   2.4 H   (0.6-1.0)  mg/dL


 


Est Cr Clr Drug Dosing   13.65   mL/min


 


Estimated GFR (MDRD)   19.5   ml/min


 


Glucose   169 H   ()  mg/dL


 


Calcium   8.7   (8.5-10.1)  mg/dL


 


Urine Color    YELLOW  


 


Urine Appearance    CLEAR  


 


Urine pH    6.0  (5.0-8.0)  


 


Ur Specific Gravity    1.015  (1.001-1.035)  


 


Urine Protein    30  (NEGATIVE)  mg/dL


 


Urine Glucose (UA)    NEGATIVE  (NEGATIVE)  mg/dL


 


Urine Ketones    NEGATIVE  (NEGATIVE)  mg/dL


 


Urine Occult Blood    LARGE H  (NEGATIVE)  


 


Urine Nitrite    POSITIVE H  (NEGATIVE)  


 


Urine Bilirubin    NEGATIVE  (NEGATIVE)  


 


Urine Urobilinogen    0.2  (<2.0)  EU/dL


 


Ur Leukocyte Esterase    TRACE  (NEGATIVE)  


 


Urine RBC    3-5  (0-2/HPF)  


 


Urine WBC    0-1  (0-5/HPF)  


 


Ur Epithelial Cells    RARE  (NONE-FEW)  


 


Urine Bacteria    2+ H  (NEGATIVE)  














  06/05/18 06/05/18 Range/Units





  07:32 07:32 


 


WBC  11.35 H   (4.0-11.0)  K/uL


 


RBC  3.20 L   (4.30-5.90)  M/uL


 


Hgb  9.7 L   (12.0-16.0)  g/dL


 


Hct  30.0 L   (36.0-46.0)  %


 


MCV  93.8   (80.0-98.0)  fL


 


MCH  30.3   (27.0-32.0)  pg


 


MCHC  32.3   (31.0-37.0)  g/dL


 


RDW Std Deviation  50.4   (28.0-62.0)  fl


 


RDW Coeff of Jason  15   (11.0-15.0)  %


 


Plt Count  219   (150-400)  K/uL


 


MPV  9.90   (7.40-12.00)  fL


 


Neut % (Auto)  80.6 H   (48.0-80.0)  %


 


Lymph % (Auto)  10.2 L   (16.0-40.0)  %


 


Mono % (Auto)  6.3   (0.0-15.0)  %


 


Eos % (Auto)  2.6   (0.0-7.0)  %


 


Baso % (Auto)  0.3   (0.0-1.5)  %


 


Neut # (Auto)  9.1 H   (1.4-5.7)  K/uL


 


Lymph # (Auto)  1.2   (0.6-2.4)  K/uL


 


Mono # (Auto)  0.7   (0.0-0.8)  K/uL


 


Eos # (Auto)  0.3   (0.0-0.7)  K/uL


 


Baso # (Auto)  0.0   (0.0-0.1)  K/uL


 


Nucleated RBC %  0.0   /100WBC


 


Nucleated RBCs #  0   K/uL


 


Sodium   135 L  (136-145)  mmol/L


 


Potassium   4.7  (3.5-5.1)  mmol/L


 


Chloride   101  ()  mmol/L


 


Carbon Dioxide   27.1  (21.0-32.0)  mmol/L


 


BUN   46 H  (7.0-18.0)  mg/dL


 


Creatinine   2.2 H  (0.6-1.0)  mg/dL


 


Est Cr Clr Drug Dosing   14.89  mL/min


 


Estimated GFR (MDRD)   21.5  ml/min


 


Glucose   132 H  ()  mg/dL


 


Calcium   9.4  (8.5-10.1)  mg/dL


 


Urine Color    


 


Urine Appearance    


 


Urine pH    (5.0-8.0)  


 


Ur Specific Gravity    (1.001-1.035)  


 


Urine Protein    (NEGATIVE)  mg/dL


 


Urine Glucose (UA)    (NEGATIVE)  mg/dL


 


Urine Ketones    (NEGATIVE)  mg/dL


 


Urine Occult Blood    (NEGATIVE)  


 


Urine Nitrite    (NEGATIVE)  


 


Urine Bilirubin    (NEGATIVE)  


 


Urine Urobilinogen    (<2.0)  EU/dL


 


Ur Leukocyte Esterase    (NEGATIVE)  


 


Urine RBC    (0-2/HPF)  


 


Urine WBC    (0-5/HPF)  


 


Ur Epithelial Cells    (NONE-FEW)  


 


Urine Bacteria    (NEGATIVE)  











Med Orders - Current: 


 Current Medications





Acetaminophen (Tylenol Extra Strength)  500 mg PO TID [facility]


   Last Admin: 06/05/18 06:27 Dose:  500 mg


Albuterol/Ipratropium (Duoneb 3.0-0.5 Mg/3 Ml)  3 ml NEB Q4HRRT PRN


   PRN Reason: Wheezing


   Last Admin: 06/02/18 21:54 Dose:  3 ml


Atorvastatin Calcium (Lipitor)  20 mg PO BEDTIME [facility]


   Last Admin: 06/04/18 20:08 Dose:  20 mg


Clopidogrel Bisulfate (Plavix)  75 mg PO DAILY [facility]


   Last Admin: 06/04/18 08:02 Dose:  75 mg


Heparin Sodium (Porcine) (Heparin Sodium)  5,000 units SUBCUT Q12H [facility]


   Last Admin: 06/05/18 04:09 Dose:  5,000 units


Ceftriaxone Sodium 1 gm/ (Sodium Chloride)  50 mls @ 100 mls/hr IV Q24H [facility]


   Last Admin: 06/04/18 14:10 Dose:  100 mls/hr


Nystatin (Nystop)  15 gm TOP TID PRN


   PRN Reason: skin care


Ondansetron HCl (Zofran)  4 mg IVPUSH Q4H PRN


   PRN Reason: Nausea


Donepezil Hcl [ (Aricept] 23 Mg)  1 each PO DAILY [facility]


   Last Admin: 06/04/18 08:02 Dose:  1 each


Brimonidine Tartrate (0.1% 1 Drop)  1 each EYERT TID [facility]


   Last Admin: 06/05/18 06:27 Dose:  1 each


Quetiapine Fumarate (Seroquel)  100 mg PO BID [facility]


   Last Admin: 06/04/18 20:08 Dose:  100 mg


Sodium Chloride (Saline Flush)  10 ml FLUSH ASDIRECTED PRN


   PRN Reason: Keep Vein Open


   Last Admin: 05/31/18 16:24 Dose:  10 ml


Sodium Chloride (Saline Flush)  2.5 ml FLUSH ASDIRECTED PRN


   PRN Reason: Keep Vein Open


Tramadol HCl (Ultram)  50 mg PO Q6H PRN


   PRN Reason: Pain


   Last Admin: 06/05/18 00:40 Dose:  50 mg





Discontinued Medications





Acetaminophen (Tylenol Extra Strength)  500 mg PO TID [facility]


Diltiazem HCl (Cardizem Cd)  360 mg PO DAILY [facility]


Sodium Chloride (Normal Saline)  500 mls @ 999 mls/hr IV STAT [facility]


Sodium Chloride (Normal Saline)  1,000 mls @ 125 mls/hr IV ASDIRECTED [facility]


   Last Admin: 06/02/18 03:22 Dose:  125 mls/hr


Metoprolol Succinate (Toprol Xl)  25 mg PO DAILY [facility]


Non-Formulary Medication (Brimonidine Tartrate)  1 drop EYERT TID [facility]


   Last Admin: 06/02/18 05:23 Dose:  Not Given











- Exam


Quality Assessment: DVT Prophylaxis.  No: Supplemental Oxygen


General: Alert, Cooperative, No Acute Distress.  No: Oriented


Neck: Supple


Lungs: Clear to Auscultation, Normal Respiratory Effort


Cardiovascular: Regular Rate, Regular Rhythm


GI/Abdominal Exam: Normal Bowel Sounds, Soft, Non-Tender, No Organomegaly, No 

Distention, No Abnormal Bruit, No Mass, Pelvis Stable


Back Exam: Normal Inspection, Full Range of Motion


Extremities: Normal Inspection, Normal Range of Motion, Non-Tender, No Pedal 

Edema, Normal Capillary Refill


Wound/Incisions: Healing Well


Neurological: No New Focal Deficit


Psy/Mental Status: Alert, Normal Affect, Normal Mood





- Problem List & Annotations


(1) Dehydration


SNOMED Code(s): 83022627


   Code(s): E86.0 - DEHYDRATION   Status: Acute   Current Visit: Yes   





(2) UTI (urinary tract infection)


SNOMED Code(s): 50079383


   Code(s): N39.0 - URINARY TRACT INFECTION, SITE NOT SPECIFIED   Status: Acute

   Current Visit: Yes   


Qualifiers: 


   Urinary tract infection type: catheter-associated UTI   Indwelling urinary 

catheter type: indwelling urethral catheter   Encounter type: initial encounter

   Qualified Code(s): T83.511A - Infection and inflammatory reaction due to 

indwelling urethral catheter, initial encounter; N39.0 - Urinary tract infection

, site not specified   





(3) Acute on chronic renal insufficiency


SNOMED Code(s): 096641053


   Code(s): N28.9 - DISORDER OF KIDNEY AND URETER, UNSPECIFIED; N18.9 - CHRONIC 

KIDNEY DISEASE, UNSPECIFIED   Status: Acute   Current Visit: No   





(4) CVA (cerebral vascular accident)


SNOMED Code(s): 784395905


   Code(s): I63.9 - CEREBRAL INFARCTION, UNSPECIFIED   Status: Chronic   

Priority: High   Current Visit: No   


Qualifiers: 


   CVA mechanism: unspecified   Qualified Code(s): I63.9 - Cerebral infarction, 

unspecified   





(5) Chronic kidney disease


SNOMED Code(s): 368530182


   Code(s): N18.9 - CHRONIC KIDNEY DISEASE, UNSPECIFIED   Status: Chronic   

Current Visit: No   





(6) Congestive heart failure


SNOMED Code(s): 17528668


   Code(s): I50.9 - HEART FAILURE, UNSPECIFIED   Status: Chronic   Current Visit

: No   





(7) PVD (peripheral vascular disease)


SNOMED Code(s): 331480933


   Code(s): I73.9 - PERIPHERAL VASCULAR DISEASE, UNSPECIFIED   Status: Chronic 

  Current Visit: No   





(8) Pacemaker


SNOMED Code(s): 342426557


   Code(s): Z95.0 - PRESENCE OF CARDIAC PACEMAKER   Status: Chronic   Current 

Visit: No   





(9) Dementia


SNOMED Code(s): 66567971


   Code(s): F03.90 - UNSPECIFIED DEMENTIA WITHOUT BEHAVIORAL DISTURBANCE   

Status: Chronic   Current Visit: Yes   





(10) CAD (coronary artery disease)


SNOMED Code(s): 57319348


   Code(s): I25.10 - ATHSCL HEART DISEASE OF NATIVE CORONARY ARTERY W/O ANG 

PCTRS   Status: Chronic   Current Visit: Yes   





- Problem List Review


Problem List Initiated/Reviewed/Updated: Yes





- My Orders


Last 24 Hours: 


My Active Orders





06/04/18 10:56


UA W/MICROSCOPIC [URIN] Routine 





06/04/18 14:37


Remove Francisco Catheter [Urinary Catheter Removal] [RC] Per Unit Routine 





06/04/18 14:56


Intake and Output Strict [RC] ASDIRECTED 














- Plan


Plan:: 


78 yo female with pmh of CAD, CVA, CKD, and CHF admitted for dehydration and 

renal failure.  








1. PINKY: Cr 2.2 today. BP borderline. All antihypertensives including diuretics 

on hold. Will continue to monitor today due to low BPs. Encourage oral intake. 





2. UTI: Catheter placed on admission. Urine clear on admission. Yesterday UA 

revealed +2 bacteria, large nitrite, trace leukocyte esterase. UC pending. 

Francisco removed. Voiding per self, incontinent. Rocephin started Leukocytosis 

improving today. Afebrile. 





2. CAD: Stable. Continue statin and Plavix. Will start metoprolol 25 mg today 

and monitor BP. 





VTE prophylaxis: Heparin.





Dispo: Possible discharge to [facility] tomorrow.

## 2018-06-06 NOTE — [id number].[id number]
**Discharge Summary





- Hospital Course


Brief History: 77 year old female with pmh of CAD, CVA, CKD, and CHF who comes 

from [facility] due to nursing complaints of altered mental status unable to follow 

commands.  Patient denies any pain or shortness of breath, but feels ill.  She 

was found to have a creatinine of 6.0 and BUN of 113.





- Discharge Data


Discharge Date: 06/06/18


Discharge Disposition: DC/Tfer to SNF [facility]


Condition: Good





- Discharge Diagnosis/Problem(s)


(1) Dehydration


SNOMED Code(s): 98633452


   ICD Code: E86.0 - DEHYDRATION   Status: Acute   Current Visit: Yes   





(2) UTI (urinary tract infection)


SNOMED Code(s): 49519995


   ICD Code: N39.0 - URINARY TRACT INFECTION, SITE NOT SPECIFIED   Status: 

Acute   Current Visit: Yes   


Qualifiers: 


   Urinary tract infection type: catheter-associated UTI   Indwelling urinary 

catheter type: indwelling urethral catheter   Encounter type: initial encounter

   Qualified Code(s): T83.511A - Infection and inflammatory reaction due to 

indwelling urethral catheter, initial encounter; N39.0 - Urinary tract infection

, site not specified   





(3) Acute on chronic renal insufficiency


SNOMED Code(s): 503184453


   ICD Code: N28.9 - DISORDER OF KIDNEY AND URETER, UNSPECIFIED; N18.9 - 

CHRONIC KIDNEY DISEASE, UNSPECIFIED   Status: Acute   Current Visit: No   





(4) CVA (cerebral vascular accident)


SNOMED Code(s): 334646839


   ICD Code: I63.9 - CEREBRAL INFARCTION, UNSPECIFIED   Status: Chronic   

Priority: High   Current Visit: No   


Qualifiers: 


   CVA mechanism: unspecified   Qualified Code(s): I63.9 - Cerebral infarction, 

unspecified   





(5) Chronic kidney disease


SNOMED Code(s): 291616616


   ICD Code: N18.9 - CHRONIC KIDNEY DISEASE, UNSPECIFIED   Status: Chronic   

Current Visit: No   





(6) Congestive heart failure


SNOMED Code(s): 12692712


   ICD Code: I50.9 - HEART FAILURE, UNSPECIFIED   Status: Chronic   Current 

Visit: No   





(7) PVD (peripheral vascular disease)


SNOMED Code(s): 507362292


   ICD Code: I73.9 - PERIPHERAL VASCULAR DISEASE, UNSPECIFIED   Status: Chronic

   Current Visit: No   





(8) Pacemaker


SNOMED Code(s): 652723733


   ICD Code: Z95.0 - PRESENCE OF CARDIAC PACEMAKER   Status: Chronic   Current 

Visit: No   





(9) Dementia


SNOMED Code(s): 21572108


   ICD Code: F03.90 - UNSPECIFIED DEMENTIA WITHOUT BEHAVIORAL DISTURBANCE   

Status: Chronic   Current Visit: Yes   





(10) CAD (coronary artery disease)


SNOMED Code(s): 30099370


   ICD Code: I25.10 - ATHSCL HEART DISEASE OF NATIVE CORONARY ARTERY W/O ANG 

PCTRS   Status: Chronic   Current Visit: Yes   





- Patient Instructions


Diet: Heart Healthy Diet, Low Sodium


Activity: As Tolerated


Showering/Bathing: May Shower


Notify Provider of: Fever, Increased Pain, Swelling and Redness, Drainage, 

Nausea and/or Vomiting


Other/Special Instructions: PT/OT/ST.  Daily Weights- notify Dr Haynes if weight 

increases by 2-3 lbs in 3-5 days.  Monitor BP closely- if BP continues to 

elevate above 130/80s, please notify Dr Haynes to begin restarting BP 

medications.  BMP to be drawn on Monday June 11, results to Dr Haynes.





- Discharge Plan


Prescriptions/Med Rec: 


Cephalexin [Keflex] 500 mg PO Q12H #4 cap


traMADol [Ultram] 50 mg PO Q6H PRN #20 tablet


 PRN Reason: Pain


Home Medications: 


 Home Meds





Clopidogrel Bisulfate [Clopidogrel] 75 mg PO DAILY 05/06/16 [History]


Metoprolol Succinate 25 mg PO DAILY 05/06/16 [History]


Nitroglycerin [Nitrostat] 0.4 mg SL .EVERY 5 MINUTES PRN 05/06/16 [History]


atorvaSTATin [Lipitor] 20 mg PO BEDTIME 05/06/16 [History]


Aspirin 81 mg PO DAILY 06/14/16 [History]


Acetaminophen 500 mg PO TID 10/31/16 [History]


Brimonidine Tartrate [Alphagan P 0.1% Ophth Soln] 1 drop EYERT QAM 10/31/16 [

History]


Multivitamin W-Minerals/Lutein [Vision Plus Lutein Vitamin] 1 tab PO DAILY 11/13 /16 [History]


Ferrous Sulfate 325 mg PO TIDMEALS #90 tablet 12/14/16 [Rx]


Nystatin [Nystop] 1 applic TOP TID PRN 12/30/16 [History]


Acetaminophen 500 mg PO Q4H PRN 11/20/17 [History]


Carboxymethylcellulose Sodium [Refresh Tears] 1 drop EYEBOTH ASDIRECTED PRN 11/ 20/17 [History]


Magnesium Hydroxide [Milk of Magnesia] 30 ml PO DAILY PRN 11/20/17 [History]


QUEtiapine [SEROquel] 100 mg PO BID 11/20/17 [History]


Albuterol Sulfate 2.5 mg INH Q4H PRN 05/31/18 [History]


Donepezil HCl [Aricept] 23 mg PO DAILY 05/31/18 [History]


Famotidine 40 mg PO BEDTIME 05/31/18 [History]


Simethicone 80 mg PO Q12H PRN 05/31/18 [History]


Umeclidinium Bromide [Incruse Ellipta*] 1 inh IH DAILY 05/31/18 [History]


Bumetanide 1 mg PO DAILY #0 06/06/18 [Rx]


Cephalexin [Keflex] 500 mg PO Q12H #4 cap 06/06/18 [Rx]


traMADol [Ultram] 50 mg PO Q6H PRN #20 tablet 06/06/18 [Rx]








Patient Handouts:  Dehydration, Adult, Easy-to-Read


Referrals: 


Cecilio Haynes MD [Physician] - 06/07/18 (Will be seen on the next [facility] 

rounds. )





- Discharge Summary/Plan Comment


DC Time >30 min.: No


Discharge Summary/Plan Comment: 





Discharge Diagnoses:





UTI


Dehydration-resolved


PINKY on CKD- continuing to improve


HTN


CHF


Hx CVA


PVD


Pacemaker


dementia


CAD





Iris was admitted for PINKY, hypotension and dehydration. She was treated with 

gentle fluids and all blood pressure and diuretics were held. She slowly 

improved with 3 days of IVF administration. with BUN 40-50s, Cr 2.2-2.4. 

Baseline BUN 40-50s and Cr 1.9-2.2. Leukocytosis noted, UA was obtained on 6/4/ 2018, UTI noted and she was started on Rocephin and love catheter was removed. 

BP slowly improved but remains borderline and she remains asymptomatic. BPs 

have been currently ranging from 105-115/60-70s. She was restarted on 

Metoprolol and BP has remained steady. Will restart Bumex at 1 mg daily and 

monitor BPs. All other antihypertensives (Diltiazem, Lisinopril, Imdu, and 

Metalozone) will be held currently and to be restarted by Dr Haynes as BP 

improves. She is at high risk for CHF exacerbation. She is to continue low salt

, heart healthy diet and to be weighed daily. Any increase in weight above 2 

lbs should be relayed to Dr Haynes so medication adjustments can be made. Dr Haynes should also be notified as BP improves so again, medication adjustments 

can be made. She will continue 2 more days of Keflex 500 mg BID for UTI, UC 

returned with mixed mei. WBC 11,900 today. Afebrile and Iris reports she 

feels good and is ready to go home. She will be discharged back to [facility] 

today. Will all home medications restarted, except for those listed above. She 

is to return to the ED for any concerns.  She will see Dr Haynes on next [facility] 

rounds. 





- General Info


Date of Service: 06/06/18


Admission Dx/Problem (Free Text: 


 Admission Diagnosis/Problem





Admission Diagnosis/Problem      Dehydration








Subjective Update: 





Reports feeling fine today and is again asking to go home. Denies pain. No 

chest pain or SOB. Screams out in pain if you touch her anywhere. 


Functional Status: Reports: Tolerating Diet, Urinating.  Denies: Ambulating





- Review of Systems


General: Reports: No Symptoms.  Denies: Fever, Weakness, Fatigue, Malaise


HEENT: Reports: No Symptoms.  Denies: Headaches, Sore Throat, Rhinitis, Visual 

Changes


Pulmonary: Reports: No Symptoms.  Denies: Shortness of Breath, Cough


Cardiovascular: Reports: No Symptoms.  Denies: Chest Pain, Palpitations, Edema


Gastrointestinal: Reports: No Symptoms.  Denies: Abdominal Pain, Nausea, 

Vomiting


Genitourinary: Reports: No Symptoms


Musculoskeletal: Reports: Back Pain (low back pain), Leg Pain (bilaterally if 

touched.)


Skin: Reports: No Symptoms


Neurological: Reports: No Symptoms


Psychiatric: Reports: No Symptoms





- Patient Data


Vitals - Most Recent: 


 Last Vital Signs











Temp  98.1 F   06/06/18 08:00


 


Pulse  89   06/06/18 08:41


 


Resp  16   06/06/18 08:00


 


BP  116/47 L  06/06/18 08:41


 


Pulse Ox  95   06/06/18 08:00











Weight - Most Recent: 179.2 kg


I&O - Last 24 hours: 


 Intake & Output











 06/05/18 06/06/18 06/06/18





 22:59 06:59 14:59


 


Intake Total 420 118 


 


Output Total 519  


 


Balance -99 118 











Lab Results - Last 24 hrs: 


 Laboratory Results - last 24 hr











  06/06/18 06/06/18 Range/Units





  06:43 06:43 


 


WBC  11.90 H   (4.0-11.0)  K/uL


 


RBC  3.28 L   (4.30-5.90)  M/uL


 


Hgb  10.0 L   (12.0-16.0)  g/dL


 


Hct  30.5 L   (36.0-46.0)  %


 


MCV  93.0   (80.0-98.0)  fL


 


MCH  30.5   (27.0-32.0)  pg


 


MCHC  32.8   (31.0-37.0)  g/dL


 


RDW Std Deviation  48.7   (28.0-62.0)  fl


 


RDW Coeff of Jason  14   (11.0-15.0)  %


 


Plt Count  226   (150-400)  K/uL


 


MPV  10.10   (7.40-12.00)  fL


 


Neut % (Auto)  73.2   (48.0-80.0)  %


 


Lymph % (Auto)  15.5 L   (16.0-40.0)  %


 


Mono % (Auto)  7.6   (0.0-15.0)  %


 


Eos % (Auto)  3.4   (0.0-7.0)  %


 


Baso % (Auto)  0.3   (0.0-1.5)  %


 


Neut # (Auto)  8.7 H   (1.4-5.7)  K/uL


 


Lymph # (Auto)  1.8   (0.6-2.4)  K/uL


 


Mono # (Auto)  0.9 H   (0.0-0.8)  K/uL


 


Eos # (Auto)  0.4   (0.0-0.7)  K/uL


 


Baso # (Auto)  0.0   (0.0-0.1)  K/uL


 


Nucleated RBC %  0.0   /100WBC


 


Nucleated RBCs #  0   K/uL


 


Sodium   134 L  (136-145)  mmol/L


 


Potassium   4.8  (3.5-5.1)  mmol/L


 


Chloride   100  ()  mmol/L


 


Carbon Dioxide   26.8  (21.0-32.0)  mmol/L


 


BUN   53 H  (7.0-18.0)  mg/dL


 


Creatinine   2.4 H  (0.6-1.0)  mg/dL


 


Est Cr Clr Drug Dosing   13.65  mL/min


 


Estimated GFR (MDRD)   19.5  ml/min


 


Glucose   111 H  ()  mg/dL


 


Calcium   9.1  (8.5-10.1)  mg/dL











[facility] Results - Last 24 hrs: 


 Microbiology











 06/04/18 10:14 Urine Culture - Final





 Urine, Bladder    MIXED MEI <1000 CFU/ML











Med Orders - Current: 


 Current Medications





Acetaminophen (Tylenol Extra Strength)  500 mg PO TID [facility]


   Last Admin: 06/06/18 06:21 Dose:  500 mg


Albuterol/Ipratropium (Duoneb 3.0-0.5 Mg/3 Ml)  3 ml NEB Q4HRRT PRN


   PRN Reason: Wheezing


   Last Admin: 06/02/18 21:54 Dose:  3 ml


Aspirin (Aspirin)  81 mg PO DAILY [facility]


   Last Admin: 06/06/18 08:40 Dose:  81 mg


Atorvastatin Calcium (Lipitor)  20 mg PO BEDTIME [facility]


   Last Admin: 06/05/18 21:23 Dose:  20 mg


Clopidogrel Bisulfate (Plavix)  75 mg PO DAILY [facility]


   Last Admin: 06/06/18 08:34 Dose:  75 mg


Heparin Sodium (Porcine) (Heparin Sodium)  5,000 units SUBCUT Q12H [facility]


   Last Admin: 06/06/18 03:05 Dose:  5,000 units


Ceftriaxone Sodium 1 gm/ (Sodium Chloride)  50 mls @ 100 mls/hr IV Q24H [facility]


   Last Admin: 06/05/18 15:12 Dose:  100 mls/hr


Metoprolol Succinate (Toprol Xl)  25 mg PO DAILY [facility]


   Last Admin: 06/06/18 08:41 Dose:  25 mg


Nystatin (Nystop)  15 gm TOP TID PRN


   PRN Reason: skin care


   Last Admin: 06/05/18 21:41 Dose:  1 applic


Ondansetron HCl (Zofran)  4 mg IVPUSH Q4H PRN


   PRN Reason: Nausea


Donepezil Hcl [ (Aricept] 23 Mg)  1 each PO DAILY [facility]


   Last Admin: 06/06/18 08:35 Dose:  1 each


Brimonidine Tartrate (0.1% 1 Drop)  1 each EYERT TID [facility]


   Last Admin: 06/06/18 06:21 Dose:  1 each


Umeclidinium Bromide (1 Inh (Ellipta))  0 each INH DAILY [facility]


   Last Admin: 06/06/18 08:44 Dose:  Not Given


Quetiapine Fumarate (Seroquel)  100 mg PO BID [facility]


   Last Admin: 06/06/18 08:34 Dose:  100 mg


Sodium Chloride (Saline Flush)  10 ml FLUSH ASDIRECTED PRN


   PRN Reason: Keep Vein Open


   Last Admin: 05/31/18 16:24 Dose:  10 ml


Sodium Chloride (Saline Flush)  2.5 ml FLUSH ASDIRECTED PRN


   PRN Reason: Keep Vein Open


Tramadol HCl (Ultram)  50 mg PO Q6H PRN


   PRN Reason: Pain


   Last Admin: 06/05/18 21:23 Dose:  50 mg





Discontinued Medications





Acetaminophen (Tylenol Extra Strength)  500 mg PO TID [facility]


Diltiazem HCl (Cardizem Cd)  360 mg PO DAILY [facility]


Sodium Chloride (Normal Saline)  500 mls @ 999 mls/hr IV STAT [facility]


Sodium Chloride (Normal Saline)  1,000 mls @ 125 mls/hr IV ASDIRECTED [facility]


   Last Admin: 06/02/18 03:22 Dose:  125 mls/hr


Metoprolol Succinate (Toprol Xl)  25 mg PO DAILY [facility]


Non-Formulary Medication (Brimonidine Tartrate)  1 drop EYERT TID [facility]


   Last Admin: 06/02/18 05:23 Dose:  Not Given











- Exam


General: Reports: Alert, Cooperative, No Acute Distress.  Denies: Oriented


Neck: Reports: Supple.  Denies: JVD


Lungs: Reports: Clear to Auscultation, Normal Respiratory Effort


Cardiovascular: Reports: Regular Rate, Irregular Rhythm


GI/Abdominal Exam: Normal Bowel Sounds, Soft, Non-Tender, No Organomegaly, No 

Distention, No Abnormal Bruit, No Mass, Pelvis Stable


Extremities: Normal Inspection, Normal Range of Motion, No Pedal Edema, Normal 

Capillary Refill, Leg Pain (with the slightest touch anywhere)


Skin: Reports: Warm, Dry


Wound/Incisions: Reports: Other (blisters, stage 1 ulcer to L lateral buttock 

and flank. Patient refusing to be repositioned frequently during stay. No 

erythema or drainage noted.)


Neurological: Reports: No New Focal Deficit


Psy/Mental Status: Reports: Alert, Normal Affect, Normal Mood

## 2018-07-24 NOTE — CR
EXAMINATION: Portable chest radiograph.

 

HISTORY: Shortness of breath.

 

FINDINGS: 

The trachea is midline. The cardiomediastinal silhouette is within normal limits. No pulmonary infilt
rates, effusions or pneumothorax. There is a left-sided pacemaker noted. Mild aortic calcifications. 
Median sternotomy wires are present.

 

Osseous structures appear unremarkable.

 

IMPRESSION: 

No acute cardiopulmonary process.

## 2018-07-24 NOTE — HP
YOB: 1940

 

PRIMARY CARE PHYSICIAN:

Unknown PCP

 

HISTORY OF PRESENT ILLNESS:

The patient is an 78-year-old female, presented to hospital, sent from the

nursing home because of chest pain that is 8/10 in intensity and localized on

the left side of the chest that is worse with deep breathing for the past three

days, continuous.  No irradiation.  The patient states that also her chest pain

was associated with shortness of breath and it is worse with position.

 

REVIEW OF SYSTEMS:

Twelve-point review of system is negative except as in history of present

illness.

 

PAST MEDICAL HISTORY:

She states she has diabetes mellitus.  Currently, she is not on any medication

for the diabetes.  She states she has hypertension, hyperlipidemia, history of

urinary tract infection, chronic renal impairment, radiating chest pain,

shoulder pain, pneumonia, kidney disease, cellulitis, dyspnea, CHF, atypical

chest pain, abdominal pain, left hip pain, history of CVA, venous stasis

dermatitis both lower extremities, chronic back pain, chest wall pain, mitral

valve regurgitation, aortic stenosis.  Actually, no history of coronary artery

disease or PVD.

 

SURGICAL HISTORY:

Patient states she had a heart surgery.  She had , gallbladder removed,

and appendicectomy.

 

ALLERGIES:

The patient is allergic to codeine and latex.

 

SOCIAL HISTORY:

She never smoked.  No alcohol use.  No drug use.  Used to work as a teacher.

 

FAMILY HISTORY:

Reviewed and noncontributory.

 

PHYSICAL EXAMINATION:

VITAL SIGNS:  At admission, her temperature was 97.3, her pulse rate 79, blood

pressure 118/45, respiratory rate 16, and pulse oximetry 97% on 2 L oxygen.

HEENT:  Her head is atraumatic, normocephalic.  Pupils equally reactive to

light.

NECK:  Supple.  No thyromegaly.  No lymphadenopathy.

HEART:  S1, S2.  Regular rhythm and rate.  No murmur.

LUNGS:  Clear to auscultation bilaterally.

ABDOMEN:  Soft, nontender.  Positive bowel sounds.

EXTREMITIES:  She has decubitus ulcer on the right heel with no skin ulceration

and she has soft boots for heels bilaterally.

 

LABORATORY DATA:

At admission, WBC 8, hemoglobin 11.5, hematocrit is 36.5, and platelet count is

236.  INR is 1.01.  Sodium 139, potassium 3.8, chloride 102, CO2 of 28.6, BUN

31, creatinine 1.4, glucose 176, calcium 8.8, total bilirubin 0.2.  AST 15, ALT

20, alkaline phosphatase 119.  Troponin less than 0.05.  Total protein 7.2,

albumin 3.1, globulin 4.1.  Albumin to globulin ratio of 0.8.  TSH 1.78.

Urinalysis showed urine color is yellow, appearance clear, pH 6, urine specific

gravity 1.01, protein negative, glucose negative, ketones negative, occult blood

moderate, nitrite negative, bilirubin negative, urobilinogen 0.2, leukocyte

esterase negative, rbc 0-2, wbc 1-3, epithelial cells few, and urine bacteria

few.  H pylori antibody negative.  Chest x-ray showing no acute cardiopulmonary

disease and EKG shows sinus rhythm, left ventricular hypertrophy, anterior Q-

waves possibly due to LVH, and prolonged QT interval which QTc is 502.

 

ASSESSMENT AND PLAN:

Chest pain, atypical.  We will admit the patient to medical telemetry.  I will

follow up three sets of cardiac enzymes.  We will give the patient tramadol 50

mg p.o. q.6 hours p.r.n. for pain and the patient is on aspirin 81 mg p.o. daily

and for chest pain atypical also, we will give Flexeril 10 mg p.o. t.i.d.  For

comorbidities, we will continue the patient with her home medication as per

reconciliation.  For DVT prophylaxis, we will give the patient Lovenox 40 mg

subcu.

 

 

ANTOPET / MODL

DD:  2018 20:21:56

DT:  2018 21:28:48

Job #:  [id number]

## 2018-07-25 NOTE — PCM.DCSUM1
**Discharge Summary





- Hospital Course


Brief History: This 78 moe old female with pmh of CAD, CVA, CKD, CHF, and 

chronic pain presented from [facility] with complaints of chest pain 

mainly with deep breathing that has been continuous for 3 days. No radiation, 8/

10 in intensity. Increases with palpation of her chest wall.


Diagnosis: Stroke: No





- Discharge Data


Discharge Date: 07/25/18


Discharge Disposition: DC/Tfer to SNF 03


Condition: Good





- Discharge Diagnosis/Problem(s)


(1) Chest pain


SNOMED Code(s): 21864830


   ICD Code: R07.9 - CHEST PAIN, UNSPECIFIED   Status: Acute   Current Visit: 

Yes   





(2) Chest wall pain


SNOMED Code(s): 920550607


   ICD Code: R07.89 - OTHER CHEST PAIN   Status: Acute   Current Visit: No   





(3) CAD (coronary artery disease)


SNOMED Code(s): 43963727


   ICD Code: I25.10 - ATHSCL HEART DISEASE OF NATIVE CORONARY ARTERY W/O ANG 

PCTRS   Status: Chronic   Current Visit: No   





(4) CVA (cerebral vascular accident)


SNOMED Code(s): 888943864


   ICD Code: I63.9 - CEREBRAL INFARCTION, UNSPECIFIED   Status: Chronic   

Priority: High   Current Visit: No   


Qualifiers: 


   CVA mechanism: unspecified   Qualified Code(s): I63.9 - Cerebral infarction, 

unspecified   





(5) Chronic kidney disease


SNOMED Code(s): 643265574


   ICD Code: N18.9 - CHRONIC KIDNEY DISEASE, UNSPECIFIED   Status: Chronic   

Current Visit: No   





(6) Congestive heart failure


SNOMED Code(s): 37751860


   ICD Code: I50.9 - HEART FAILURE, UNSPECIFIED   Status: Chronic   Current 

Visit: No   





(7) Dementia


SNOMED Code(s): 11469025


   ICD Code: F03.90 - UNSPECIFIED DEMENTIA WITHOUT BEHAVIORAL DISTURBANCE   

Status: Chronic   Current Visit: No   





(8) PVD (peripheral vascular disease)


SNOMED Code(s): 319553171


   ICD Code: I73.9 - PERIPHERAL VASCULAR DISEASE, UNSPECIFIED   Status: Chronic

   Current Visit: No   





(9) Pacemaker


SNOMED Code(s): 729956312


   ICD Code: Z95.0 - PRESENCE OF CARDIAC PACEMAKER   Status: Chronic   Current 

Visit: No   





- Patient Instructions


Diet: Heart Healthy Diet (Low sodium, low fat, thin consistency, regular 

texture.)


Activity: As Tolerated


Showering/Bathing: May Shower


Notify Provider of: Fever, Increased Pain, Swelling and Redness, Drainage, 

Nausea and/or Vomiting


Other/Special Instructions: PT/OT/ST.  Oxygen 2 L NC PRN sats less than 90% or 

for shortness of breath





- Discharge Plan


*PRESCRIPTION DRUG MONITORING PROGRAM REVIEWED*: Not Applicable


*COPY OF PRESCRIPTION DRUG MONITORING REPORT IN PATIENT DRAKE: Not Applicable


Prescriptions/Med Rec: 


traMADol [Ultram] 50 mg PO Q6H PRN #15 tablet


 PRN Reason: Pain


Home Medications: 


 Home Meds





Clopidogrel Bisulfate [Clopidogrel] 75 mg PO DAILY 05/06/16 [History]


Metoprolol Succinate 25 mg PO DAILY 05/06/16 [History]


Nitroglycerin [Nitrostat] 0.4 mg SL .EVERY 5 MINUTES PRN 05/06/16 [History]


atorvaSTATin [Lipitor] 20 mg PO BEDTIME 05/06/16 [History]


Aspirin 81 mg PO DAILY 06/14/16 [History]


Brimonidine Tartrate [Alphagan P 0.1% Ophth Soln] 1 drop EYERT QAM 10/31/16 [

History]


Multivitamin W-Minerals/Lutein [Vision Plus Lutein Vitamin] 1 tab PO DAILY 11/13 /16 [History]


Ferrous Sulfate 325 mg PO TIDMEALS #90 tablet 12/14/16 [Rx]


Acetaminophen 500 mg PO Q4H PRN 11/20/17 [History]


Carboxymethylcellulose Sodium [Refresh Tears] 1 drop EYEBOTH ASDIRECTED PRN 11/ 20/17 [History]


Magnesium Hydroxide [Milk of Magnesia] 30 ml PO DAILY PRN 11/20/17 [History]


QUEtiapine [SEROquel] 100 mg PO BID 11/20/17 [History]


Albuterol Sulfate 2.5 mg INH Q4H PRN 05/31/18 [History]


Donepezil HCl [Aricept] 23 mg PO DAILY 05/31/18 [History]


Famotidine 40 mg PO BEDTIME 05/31/18 [History]


Simethicone 80 mg PO Q12H PRN 05/31/18 [History]


Umeclidinium Bromide [Incruse Ellipta*] 1 inh IH DAILY 05/31/18 [History]


Bumetanide 1 mg PO DAILY #0 06/06/18 [Rx]


Citalopram Hydrobromide [Celexa] 10 mg PO DAILY 07/24/18 [History]


traMADol [Ultram] 50 mg PO Q6H PRN #15 tablet 07/25/18 [Rx]








Referrals: 


Cecilio Haynes MD [Physician] - 07/26/18





- Discharge Summary/Plan Comment


DC Time >30 min.: No


Discharge Summary/Plan Comment: 





Discharge Diagnoses:





Atypical chest pain, chest wall pain


HTN


CHF


Hx CVA


PVD


Pacemaker


Dementia


CAD





Iris was admitted observation for atypical chest pain. Troponins were trended 

and found to be normal. No EKG changes. ACS ruled out. VS remain stable. 

Labwork at baseline. She will be discharged back to [facility] today with no 

changes to her medications. She is to follow up with PCP in 1 week. Return to 

ED or clinic if concerns should arise. 





- General Info


Date of Service: 07/25/18


Admission Dx/Problem (Free Text: 





Atypical chest pain   


Subjective Update: 





Sitting up in chair, reports pain to anywhere she is touched. Easily falls back 

asleep during interview. 


Functional Status: Reports: Tolerating Diet, Urinating.  Denies: Ambulating





- Review of Systems


HEENT: Reports: No Symptoms.  Denies: Sore Throat


Pulmonary: Reports: Pleuritic Chest Pain (hurts with palpation of anterior 

chest wall).  Denies: Shortness of Breath, Cough, Sputum, Hemoptysis


Cardiovascular: Reports: No Symptoms.  Denies: Chest Pain, Edema


Gastrointestinal: Reports: No Symptoms, Abdominal Pain.  Denies: Nausea, 

Vomiting


Musculoskeletal: Reports: Other (pain all over, where ever she is touched.)


Neurological: Reports: No Symptoms


Psychiatric: Reports: No Symptoms





- Patient Data


Vitals - Most Recent: 


 Last Vital Signs











Temp  98.4 F   07/25/18 04:00


 


Pulse  68   07/25/18 04:00


 


Resp  19   07/25/18 04:00


 


BP  124/69   07/25/18 04:00


 


Pulse Ox  94 L  07/25/18 04:00











Weight - Most Recent: 85 kg


I&O - Last 24 hours: 


 Intake & Output











 07/24/18 07/25/18 07/25/18





 22:59 06:59 14:59


 


Intake Total 50 540 


 


Output Total 0 500 


 


Balance 50 40 











Lab Results - Last 24 hrs: 


 Laboratory Results - last 24 hr











  07/24/18 07/24/18 07/24/18 Range/Units





  10:02 10:02 10:02 


 


WBC  8.00    (4.0-11.0)  K/uL


 


RBC  3.95 L    (4.30-5.90)  M/uL


 


Hgb  11.5 L    (12.0-16.0)  g/dL


 


Hct  36.5    (36.0-46.0)  %


 


MCV  92.4    (80.0-98.0)  fL


 


MCH  29.1    (27.0-32.0)  pg


 


MCHC  31.5    (31.0-37.0)  g/dL


 


RDW Std Deviation  49.1    (28.0-62.0)  fl


 


RDW Coeff of Jason  15    (11.0-15.0)  %


 


Plt Count  236    (150-400)  K/uL


 


MPV  9.60    (7.40-12.00)  fL


 


Neut % (Auto)  65.0    (48.0-80.0)  %


 


Lymph % (Auto)  24.5    (16.0-40.0)  %


 


Mono % (Auto)  5.3    (0.0-15.0)  %


 


Eos % (Auto)  4.1    (0.0-7.0)  %


 


Baso % (Auto)  1.1    (0.0-1.5)  %


 


Neut # (Auto)  5.2    (1.4-5.7)  K/uL


 


Lymph # (Auto)  2.0    (0.6-2.4)  K/uL


 


Mono # (Auto)  0.4    (0.0-0.8)  K/uL


 


Eos # (Auto)  0.3    (0.0-0.7)  K/uL


 


Baso # (Auto)  0.1    (0.0-0.1)  K/uL


 


Nucleated RBC %  0.0    /100WBC


 


Nucleated RBCs #  0    K/uL


 


INR   1.01   


 


Sodium    139  (136-145)  mmol/L


 


Potassium    3.8  (3.5-5.1)  mmol/L


 


Chloride    102  ()  mmol/L


 


Carbon Dioxide    28.6  (21.0-32.0)  mmol/L


 


BUN    31 H  (7.0-18.0)  mg/dL


 


Creatinine    1.4 H  (0.6-1.0)  mg/dL


 


Est Cr Clr Drug Dosing    TNP  


 


Estimated GFR (MDRD)    36.2  ml/min


 


Glucose    176 H  ()  mg/dL


 


Calcium    8.8  (8.5-10.1)  mg/dL


 


Total Bilirubin    0.2  (0.2-1.0)  mg/dL


 


AST    15  (15-37)  IU/L


 


ALT    20  (14-63)  IU/L


 


Alkaline Phosphatase    119 H  ()  U/L


 


Troponin I    < 0.050  (0.000-0.056)  ng/mL


 


Total Protein    7.2  (6.4-8.2)  g/dL


 


Albumin    3.1 L  (3.4-5.0)  g/dL


 


Globulin    4.1 H  (2.0-3.5)  g/dL


 


Albumin/Globulin Ratio    0.8 L  (1.3-2.8)  


 


TSH 3rd Generation    1.78  (0.36-3.74)  uIU/mL


 


Urine Color     


 


Urine Appearance     


 


Urine pH     (5.0-8.0)  


 


Ur Specific Gravity     (1.001-1.035)  


 


Urine Protein     (NEGATIVE)  mg/dL


 


Urine Glucose (UA)     (NEGATIVE)  mg/dL


 


Urine Ketones     (NEGATIVE)  mg/dL


 


Urine Occult Blood     (NEGATIVE)  


 


Urine Nitrite     (NEGATIVE)  


 


Urine Bilirubin     (NEGATIVE)  


 


Urine Urobilinogen     (<2.0)  EU/dL


 


Ur Leukocyte Esterase     (NEGATIVE)  


 


Urine RBC     (0-2/HPF)  


 


Urine WBC     (0-5/HPF)  


 


Ur Epithelial Cells     (NONE-FEW)  


 


Urine Bacteria     (NEGATIVE)  


 


H. pylori IgG Antibody     (NEG)  














  07/24/18 07/24/18 07/24/18 Range/Units





  10:02 11:40 16:00 


 


WBC     (4.0-11.0)  K/uL


 


RBC     (4.30-5.90)  M/uL


 


Hgb     (12.0-16.0)  g/dL


 


Hct     (36.0-46.0)  %


 


MCV     (80.0-98.0)  fL


 


MCH     (27.0-32.0)  pg


 


MCHC     (31.0-37.0)  g/dL


 


RDW Std Deviation     (28.0-62.0)  fl


 


RDW Coeff of Jason     (11.0-15.0)  %


 


Plt Count     (150-400)  K/uL


 


MPV     (7.40-12.00)  fL


 


Neut % (Auto)     (48.0-80.0)  %


 


Lymph % (Auto)     (16.0-40.0)  %


 


Mono % (Auto)     (0.0-15.0)  %


 


Eos % (Auto)     (0.0-7.0)  %


 


Baso % (Auto)     (0.0-1.5)  %


 


Neut # (Auto)     (1.4-5.7)  K/uL


 


Lymph # (Auto)     (0.6-2.4)  K/uL


 


Mono # (Auto)     (0.0-0.8)  K/uL


 


Eos # (Auto)     (0.0-0.7)  K/uL


 


Baso # (Auto)     (0.0-0.1)  K/uL


 


Nucleated RBC %     /100WBC


 


Nucleated RBCs #     K/uL


 


INR     


 


Sodium     (136-145)  mmol/L


 


Potassium     (3.5-5.1)  mmol/L


 


Chloride     ()  mmol/L


 


Carbon Dioxide     (21.0-32.0)  mmol/L


 


BUN     (7.0-18.0)  mg/dL


 


Creatinine     (0.6-1.0)  mg/dL


 


Est Cr Clr Drug Dosing     


 


Estimated GFR (MDRD)     ml/min


 


Glucose     ()  mg/dL


 


Calcium     (8.5-10.1)  mg/dL


 


Total Bilirubin     (0.2-1.0)  mg/dL


 


AST     (15-37)  IU/L


 


ALT     (14-63)  IU/L


 


Alkaline Phosphatase     ()  U/L


 


Troponin I    < 0.050  (0.000-0.056)  ng/mL


 


Total Protein     (6.4-8.2)  g/dL


 


Albumin     (3.4-5.0)  g/dL


 


Globulin     (2.0-3.5)  g/dL


 


Albumin/Globulin Ratio     (1.3-2.8)  


 


TSH 3rd Generation     (0.36-3.74)  uIU/mL


 


Urine Color   YELLOW   


 


Urine Appearance   CLEAR   


 


Urine pH   6.0   (5.0-8.0)  


 


Ur Specific Gravity   1.010   (1.001-1.035)  


 


Urine Protein   NEGATIVE   (NEGATIVE)  mg/dL


 


Urine Glucose (UA)   NEGATIVE   (NEGATIVE)  mg/dL


 


Urine Ketones   NEGATIVE   (NEGATIVE)  mg/dL


 


Urine Occult Blood   MODERATE   (NEGATIVE)  


 


Urine Nitrite   NEGATIVE   (NEGATIVE)  


 


Urine Bilirubin   NEGATIVE   (NEGATIVE)  


 


Urine Urobilinogen   0.2   (<2.0)  EU/dL


 


Ur Leukocyte Esterase   NEGATIVE   (NEGATIVE)  


 


Urine RBC   0-2   (0-2/HPF)  


 


Urine WBC   1-3   (0-5/HPF)  


 


Ur Epithelial Cells   FEW   (NONE-FEW)  


 


Urine Bacteria   FEW   (NEGATIVE)  


 


H. pylori IgG Antibody  NEGATIVE    (NEG)  














  07/24/18 07/25/18 07/25/18 Range/Units





  20:10 06:25 06:25 


 


WBC   7.34   (4.0-11.0)  K/uL


 


RBC   3.88 L   (4.30-5.90)  M/uL


 


Hgb   11.4 L   (12.0-16.0)  g/dL


 


Hct   36.5   (36.0-46.0)  %


 


MCV   94.1   (80.0-98.0)  fL


 


MCH   29.4   (27.0-32.0)  pg


 


MCHC   31.2   (31.0-37.0)  g/dL


 


RDW Std Deviation   49.6   (28.0-62.0)  fl


 


RDW Coeff of Jason   15   (11.0-15.0)  %


 


Plt Count   214   (150-400)  K/uL


 


MPV   9.30   (7.40-12.00)  fL


 


Neut % (Auto)     (48.0-80.0)  %


 


Lymph % (Auto)     (16.0-40.0)  %


 


Mono % (Auto)     (0.0-15.0)  %


 


Eos % (Auto)     (0.0-7.0)  %


 


Baso % (Auto)     (0.0-1.5)  %


 


Neut # (Auto)     (1.4-5.7)  K/uL


 


Lymph # (Auto)     (0.6-2.4)  K/uL


 


Mono # (Auto)     (0.0-0.8)  K/uL


 


Eos # (Auto)     (0.0-0.7)  K/uL


 


Baso # (Auto)     (0.0-0.1)  K/uL


 


Nucleated RBC %   0.0   /100WBC


 


Nucleated RBCs #   0   K/uL


 


INR     


 


Sodium    139  (136-145)  mmol/L


 


Potassium    4.2  (3.5-5.1)  mmol/L


 


Chloride    104  ()  mmol/L


 


Carbon Dioxide    30.4  (21.0-32.0)  mmol/L


 


BUN    32 H  (7.0-18.0)  mg/dL


 


Creatinine    1.4 H  (0.6-1.0)  mg/dL


 


Est Cr Clr Drug Dosing    23.02  


 


Estimated GFR (MDRD)    36.2  ml/min


 


Glucose    118 H  ()  mg/dL


 


Calcium    8.7  (8.5-10.1)  mg/dL


 


Total Bilirubin     (0.2-1.0)  mg/dL


 


AST     (15-37)  IU/L


 


ALT     (14-63)  IU/L


 


Alkaline Phosphatase     ()  U/L


 


Troponin I  < 0.050    (0.000-0.056)  ng/mL


 


Total Protein     (6.4-8.2)  g/dL


 


Albumin     (3.4-5.0)  g/dL


 


Globulin     (2.0-3.5)  g/dL


 


Albumin/Globulin Ratio     (1.3-2.8)  


 


TSH 3rd Generation     (0.36-3.74)  uIU/mL


 


Urine Color     


 


Urine Appearance     


 


Urine pH     (5.0-8.0)  


 


Ur Specific Gravity     (1.001-1.035)  


 


Urine Protein     (NEGATIVE)  mg/dL


 


Urine Glucose (UA)     (NEGATIVE)  mg/dL


 


Urine Ketones     (NEGATIVE)  mg/dL


 


Urine Occult Blood     (NEGATIVE)  


 


Urine Nitrite     (NEGATIVE)  


 


Urine Bilirubin     (NEGATIVE)  


 


Urine Urobilinogen     (<2.0)  EU/dL


 


Ur Leukocyte Esterase     (NEGATIVE)  


 


Urine RBC     (0-2/HPF)  


 


Urine WBC     (0-5/HPF)  


 


Ur Epithelial Cells     (NONE-FEW)  


 


Urine Bacteria     (NEGATIVE)  


 


H. pylori IgG Antibody     (NEG)  











LUKE Results - Last 24 hrs: 


 Microbiology











 07/24/18 10:34 Anaerobic Blood Culture - Final





 Blood - Venous - Lab Draw 











Med Orders - Current: 


 Current Medications





Acetaminophen (Tylenol Extra Strength)  500 mg PO Q4H PRN


   PRN Reason: Pain/Fever


   Last Admin: 07/24/18 17:52 Dose:  500 mg


Albuterol (Proventil Neb Soln)  2.5 mg INH Q4H PRN


   PRN Reason: Wheezing


Artificial Tears (Refresh Plus 0.5%)  1 each EYEBOTH ASDIRECTED PRN


   PRN Reason: Dry Eyes


Aspirin (Aspirin)  81 mg PO DAILY MANE


Atorvastatin Calcium (Lipitor)  20 mg PO BEDTIME MANE


   Last Admin: 07/24/18 21:36 Dose:  20 mg


Bumetanide (Bumex)  1 mg PO DAILY [facility]


Citalopram Hydrobromide (Celexa)  10 mg PO DAILY [facility]


Clopidogrel Bisulfate (Plavix)  75 mg PO DAILY [facility]


Cyclobenzaprine HCl (Flexeril)  10 mg PO TID PRN


   PRN Reason: Chest Pain


   Last Admin: 07/24/18 21:36 Dose:  10 mg


Famotidine (Pepcid)  40 mg PO BEDTIME [facility]


   Last Admin: 07/24/18 21:35 Dose:  40 mg


Ferrous Sulfate (Ferrous Sulfate)  325 mg PO TIDMEALS [facility]


   Last Admin: 07/25/18 07:37 Dose:  325 mg


Magnesium Hydroxide (Milk Of Magnesia)  30 ml PO DAILY PRN


   PRN Reason: Constipation


Metoprolol Succinate (Toprol Xl)  25 mg PO DAILY [facility]


Morphine Sulfate (Morphine)  2 mg IVPUSH Q2H PRN


   PRN Reason: Pain (severe 7-10)


   Stop: 07/25/18 15:43


Multivitamins/Minerals (Prosight)  1 tab PO DAILY [facility]


Nitroglycerin (Nitrostat)  0.4 mg SL .EVERY 5 MINUTES PRN


   PRN Reason: Angina


Non-Formulary Medication (Brimonidine Tartrate)  1 drop EYERT QAM [facility]


Donepezil Hcl [ (Aricept] 23 Mg)  1 each PO DAILY [facility]


Umeclidinium Bromide (1 Inh)  0 each INH DAILY [facility]


Quetiapine Fumarate (Seroquel)  100 mg PO BID [facility]


   Last Admin: 07/24/18 21:37 Dose:  100 mg


Simethicone (Simethicone)  80 mg PO Q12H PRN


   PRN Reason: GAS PAIN


Sodium Chloride (Saline Flush)  10 ml FLUSH ASDIRECTED PRN


   PRN Reason: Keep Vein Open


   Last Admin: 07/24/18 10:16 Dose:  10 ml


Sodium Chloride (Saline Flush)  2.5 ml FLUSH ASDIRECTED PRN


   PRN Reason: Keep Vein Open


   Last Admin: 07/24/18 10:16 Dose:  2.5 ml


Sodium Chloride (Saline Flush)  10 ml FLUSH ASDIRECTED PRN


   PRN Reason: Keep Vein Open


   Last Admin: 07/24/18 10:16 Dose:  10 ml


Sodium Chloride (Saline Flush)  2.5 ml FLUSH ASDIRECTED PRN


   PRN Reason: Keep Vein Open


   Last Admin: 07/24/18 10:16 Dose:  2.5 ml


Sodium Chloride (Saline Flush)  10 ml FLUSH ASDIRECTED PRN


   PRN Reason: Keep Vein Open


Sodium Chloride (Saline Flush)  2.5 ml FLUSH ASDIRECTED PRN


   PRN Reason: Keep Vein Open


Tramadol HCl (Ultram)  50 mg PO Q6H PRN


   PRN Reason: Pain


   Last Admin: 07/25/18 07:37 Dose:  50 mg





Discontinued Medications





Enoxaparin Sodium (Lovenox)  40 mg SUBCUT ONETIME ONE


   Stop: 07/24/18 20:22


   Last Admin: 07/24/18 21:35 Dose:  40 mg


Famotidine (Pepcid)  20 mg IVPUSH ONETIME ONE


   Stop: 07/24/18 10:12


   Last Admin: 07/24/18 10:21 Dose:  20 mg


Sodium Chloride (Normal Saline)  1,000 mls @ 125 mls/hr IV ASDIRECTED MANE


   Last Admin: 07/24/18 10:17 Dose:  125 mls/hr


Morphine Sulfate (Morphine)  2 mg IVPUSH ONETIME ONE


   Stop: 07/24/18 10:07


   Last Admin: 07/24/18 10:16 Dose:  2 mg


Nitroglycerin (Nitro-Bid 2%)  0.5 gm TOP ONETIME ONE


   Stop: 07/24/18 10:07


   Last Admin: 07/24/18 10:16 Dose:  0.5 gm


Ondansetron HCl (Zofran)  4 mg IVPUSH ONETIME ONE


   Stop: 07/24/18 10:07


   Last Admin: 07/24/18 10:16 Dose:  4 mg











- Exam


General: Reports: Alert, Cooperative, No Acute Distress.  Denies: Oriented


Neck: Reports: Supple


Lungs: Reports: Clear to Auscultation, Normal Respiratory Effort


Cardiovascular: Reports: Regular Rate, Regular Rhythm, No Murmurs


GI/Abdominal Exam: Normal Bowel Sounds, Soft, Non-Tender


Extremities: Normal Inspection, Normal Range of Motion, Other (tenderness any 

where she is touched)


Skin: Reports: Warm, Dry


Neurological: Reports: No New Focal Deficit


Psy/Mental Status: Reports: Alert, Normal Affect, Normal Mood

## 2020-01-03 NOTE — CR
INDICATION:



Shortness of breath



TECHNIQUE:



Chest 1 view



COMPARISON:



None



FINDINGS:



Cardiovascular and mediastinum:  Cardiomegaly with left-sided dual lead 

pacemaker. Atherosclerosis. 



Lungs and pleural spaces:  Lungs are clear.  No sign of infiltrate or mass. 

 No sign of pleural effusion.  No pneumothorax.  



Bones and soft tissues: Status post median sternotomy. Status post cervical 

spine surgery. 



IMPRESSION:



Cardiomegaly without acute pulmonary consolidation.



Dictated by Roberto Nelson MD @ [facility]  3 2020  3:38AM



Signed by Dr. Roberto Nelson @ [facility]  3 2020  3:39AM

## 2020-01-03 NOTE — PCM.SN
- Free Text/Narrative


Note: 


Called by RT as they have been unsuccessful for ABG.  Rt radial pulse was 

palpated, needle was introduced in the artery.  1mL of arterial blood was 

obtained for ABG studies.  Pressure dressing placed.  Pt tolerated well.

## 2020-01-03 NOTE — PCM.SN
- Free Text/Narrative


Note: 





Pt in respiratory failure.  Requested ET for transport.  On Bipap.  





Propofol 100mg


Rocuronium 50mg


given IV followed with saline flush.


Hyperventilated x3min with bag/valve/mask


Sa02 100%


ET with glidescope on attempt 1.


BS=BS ETC02 present.


CxR done.





Tolerated well.

## 2020-01-03 NOTE — PCM.SN
- Free Text/Narrative


Note: 





PAtient admitted from [facility] due to concerns of worsening pneumonia due to 

fevers and hypoxia.  IN the ED there was concern for possible sepsis.  PAtient 

has received fluids with improvement in blood pressure.  PAtient was placed on 

BIPAP due to hypercapnic respiratory failure seen on ABG.  She is tolerating 

BIPAP well.  Will transfer to ICU For further monitoring while titrating BIPAP 

when Bed becomes available.

## 2020-01-03 NOTE — PN
[facility] Physician - Brief Progress [id number]/03/2020 14:48[facility] [place] [facility] - [facility] JONI FORRESTERDate of Service 01/03/2020 14:48HPI/Events of 
Note eICU Admission Xoro11Y admitted for respiratory failure. History obtained primarily from review 
of EMR.PMH: Prior history of venous thromboembolism, coronary artery disease, heart failure status po
st pacemaker placement, peripheral vascular disease, GERD, emphysema, prior CVA, anxiety and depressi
onHPI: Patient presented to the emergency room from nursing home for progressively worsening shortnes
s of breath.  On arrival patient was noted to be acidemic with hypercapnia, prompting patient be plac
ed on BPAP therapy.  Following initiation of therapy, progression of acidemia has improved somewhat. 
 Due to continued respiratory acidosis and concern for impending respiratory compromise patient was t
ransferred to the intensive care unit for further management.Camera exam: Laying in bed. Vitals monit
or reviewed.  BPAP settings 17/7 FiO2 40% drawing tidal volumes around 200 to 350 mL's and a rate of 
22Vitals: reviewedLabs: reviewedRadiology: reviewed, chest x-ray did not reveal any acute infiltrates
Meds: reviewedeICU Impression and Recommendations:Hypercapnic respiratory failure, likely secondary t
o exacerbation of underlying emphysema. Respiratory status appears tenuous at best, with continued ac
idemia despite initiation of NIPPV.  Tidal volumes despite a delta of 10 range in the 200s to low 300
s, suggesting inadequate minute ventilation.  From the current data available, patient does not appea
r to have an acute pneumonia, or a septic picture.  Additionally patient does have been element of hy
poxemia, with a PF ratio of less than 200 (~180), however given a clear chest x-ray this suggests aga
inst a cardiogenic or infectious etiologyElevated creatinine, differential including chronic kidney d
isease or acute kidney injury.  Either way, CT angiography of the chest to rule out a pulmonary embol
ism would put patient at increased risk of further renal injuryAgree with corticosteroid therapyD-dim
er, if positive recommend lower extremity ultrasound and VQ scanAgree with antibiotics for COPD exace
rbationGiven patient's full CODE STATUS, I believe we are approaching a time at which we need to cons
ider intubation.  Recommend repeat ABG now, and if hypercapnia has failed to improve I would recommen
d immediate intubation.DVT and GI prophylaxis as appropriate.Thank you for allowing us to participate
 in the care of this patient.The above note transcribed with the assistance of dictation software. Pl
ease excuse any errors.Interventions Major-Respiratory failure - evaluation and managementElectronica
lly Signed by: GORDO CHEEMA) on 01/03/2020 14:59

## 2020-01-03 NOTE — EDM.PDOC
ED HPI GENERAL MEDICAL PROBLEM





- General


Chief Complaint: Fever


Stated Complaint: SHORTNESS OF BREATH


Time Seen by Provider: 20 02:51


Source of Information: Reports: Patient, RN


History Limitations: Reports: No Limitations





- History of Present Illness


INITIAL COMMENTS - FREE TEXT/NARRATIVE: 


79-year-old female presents the emergency room having shortness of breath at 

the nursing home.  Patient has a history of anemia, congestive heart failure, 

patient apparently had a fever and became short of breath.  Patient is on 

oxygen as needed at the nursing home





Onset: Today


Duration: Hour(s):, Getting Worse


Worsens with: Reports: Breathing


Associated Symptoms: Reports: cough w sputum, Shortness of Breath, Weakness


Treatments PTA: Reports: Acetaminophen, NSAIDS


  ** no pain


Pain Score (Numeric/FACES): 0





- Related Data


 Allergies











Allergy/AdvReac Type Severity Reaction Status Date / Time


 


codeine Allergy  Other Verified 18 10:17


 


latex Allergy  Other Verified 18 10:17


 


phosphates Allergy  Other Uncoded 18 10:17











Home Meds: 


 Home Meds





Clopidogrel Bisulfate [Clopidogrel] 75 mg PO DAILY 16 [History]


Metoprolol Succinate 25 mg PO DAILY 16 [History]


Nitroglycerin [Nitrostat] 0.4 mg SL .EVERY 5 MINUTES PRN 16 [History]


atorvaSTATin [Lipitor] 20 mg PO BEDTIME 16 [History]


Aspirin 81 mg PO DAILY 16 [History]


Multivit with Minerals/Lutein [Vision Plus Lutein Vitamin] 1 tab PO DAILY  [History]


Ferrous Sulfate 325 mg PO TIDMEALS #90 tablet 16 [Rx]


Acetaminophen 500 mg PO Q4H PRN 17 [History]


Carboxymethylcellulose Sodium [Refresh Tears] 1 drop EYEBOTH ASDIRECTED PRN  [History]


Magnesium Hydroxide [Milk of Magnesia] 30 ml PO DAILY PRN 17 [History]


Albuterol Sulfate 2.5 mg INH Q4H PRN 18 [History]


Simethicone 80 mg PO Q12H PRN 18 [History]


Bumetanide 1 mg PO DAILY #0 18 [Rx]


Chlorhexidine Gluconate [Peridex 0.12% Rinse] 15 ml MM ASDIRECTED 20 [

History]


Citalopram Hydrobromide [Celexa] 20 mg PO DAILY 20 [History]


Donepezil HCl 23 mg PO DAILY 20 [History]


Doxycycline [Doxycycline Monohydrate] 100 mg PO BID 20 [History]


Ibuprofen 200 mg PO ASDIRECTED PRN 20 [History]


Mag Hydrox/Aluminum Hyd/Simeth [Mylanta Maximum Strength Liq] 355 ml PO 

ASDIRECTED PRN 20 [History]


Magnesium Hydroxide [Milk of Magnesia] 30 ml PO DAILY PRN 20 [History]


Omeprazole 20 mg PO DAILY 20 [History]


QUEtiapine Fumarate [Seroquel] 50 mg PO BID 20 [History]


predniSONE [Prednisone] 50 mg PO DAILY 20 [History]


traMADol [Ultram] 50 mg PO ASDIRECTED PRN 20 [History]











Past Medical History





- Past Health History


Medical/Surgical History: Denies Medical/Surgical History


HEENT History: Reports: Cataract


Cardiovascular History: Reports: Arrhythmia, Blood Clots/VTE/DVT, CAD, Heart 

Failure, Pacemaker, PVD


Other Cardiovascular History: CHF, DVT


Respiratory History: Reports: COPD, Other (See Below)


Other Respiratory History: Emphysema


Gastrointestinal History: Reports: GERD


Other Gastrointestinal History: "heartburn"


Genitourinary History: Reports: Chronic Renal Insuffiency


Other Genitourinary History: [facility] 

bleeding that started after breakfast today then went on to be heavy with blood 

clots through the afternoon.


OB/GYN History: Reports: Pregnancy


Musculoskeletal History: Reports: Arthritis, Other (See Below)


Other Musculoskeletal History: chronic hip pain left more than the right


Neurological History: Reports: CVA, Migraines


Other Neuro History: Encephalopathy, facial weakness/hemiphlegia/hemiparesis 

following cerebral infarction,


Psychiatric History: Reports: Anxiety, Depression


Other Psychiatric History: Violent Behavioral Outbursts


Endocrine/Metabolic History: Reports: None


Hematologic History: Reports: None


Immunologic History: Reports: None


Oncologic (Cancer) History: Reports: None


Dermatologic History: Reports: Other (See Below)


Other Dermatologic History: frequent yeast to abdominal folds and groins





- Infectious Disease History


Infectious Disease History: Reports: Chicken Pox, Measles, Meningitis





- Past Surgical History


Head Surgeries/Procedures: Reports: None


Cardiovascular Surgical History: Reports: Other (See Below)


Female  Surgical History: Reports:  Section


Dermatological Surgical History: Reports: None





Social & Family History





- Family History


Family Medical History: Noncontributory


Neurological: Reports: CVA


Psychiatric: Reports: Abuse, Victim of, Anxiety


Endocrine/Metabolic: Reports: Diabetes, type II


Immunologic: Reports: None





- Tobacco Use


Smoking Status *Q: Unknown Ever Smoked





- Caffeine Use


Caffeine Use: Reports: None


Caffeine Use Comment: unknown, pt chosing not to talk





- Recreational Drug Use


Recreational Drug Use: No





- Living Situation & Occupation


Living situation: Reports: with Family


Occupation: Unemployed





ED ROS GENERAL





- Review of Systems


Review Of Systems: Comprehensive ROS is negative, except as noted in HPI.


Constitutional: Reports: No Symptoms, Fever


HEENT: Reports: No Symptoms


Respiratory: Reports: Shortness of Breath, Wheezing


Cardiovascular: Reports: Chest Pain, Blood Pressure Problem


Endocrine: Reports: No Symptoms


GI/Abdominal: Reports: No Symptoms


: Reports: No Symptoms


Musculoskeletal: Reports: No Symptoms


Skin: Reports: No Symptoms


Neurological: Reports: No Symptoms


Psychiatric: Reports: Hallucinations





ED EXAM, SEPSIS





- Physical Exam


Exam: See Below


Text/Narrative:: 





79-year-old female presents the emergency room short of breath.  Patient unable 

to give a history.  On exam she is tachypneic rate of 28 hypoxic O2 sat in the 

80s off oxygen.


Lungs scattered wheezing scattered wheezing throughout





Chest S1-S2


Abdomen soft nontender





extremities  is normal


no signs of DVT





ASSMENT:HYPOXIA 


Exam Limited By: Altered Mental Status


General Appearance: Alert, WD/WN, Moderate Distress


Eye Exam: Bilateral Eye: Normal Fundi, Normal Inspection


Ears: Normal External Exam, Normal Canal, Hearing Grossly Normal, Normal TMs


Nose: Normal Inspection, Normal Mucosa, No Blood


Throat/Mouth: Normal Inspection, Normal Lips, Normal Oropharynx


Head: Atraumatic, Normocephalic


Neck: Normal Inspection, Supple, Non-Tender


Respiratory/Chest: Lungs Clear, Wheezing, Accessory Muscle Use.  No: No 

Respiratory Distress


Cardiovascular: Normal Peripheral Pulses, Regular Rate, Rhythm, No JVD, No 

Murmur, No Rub


GI/Abdominal Exam: Normal Bowel Sounds, Soft, Non-Tender, No Distention, No 

Abnormal Bruit


Rectal (Female) Exam: Deferred


Back: Normal Inspection, Full Range of Motion


Extremities: Non-Tender


Neurological: Alert, Confused


Skin: Warm, Dry, Intact, Normal Color





Course





- Vital Signs


Text/Narrative:: 





79-year-old female who presents from a local nursing home with a history of 

shortness of breath tachypnea and fever.  Patient's has a history of being 

placed on steroids for the last 3 days 50 mg of prednisone being tapered and 

doxycycline for an antibiotic.  Patient has been wheezing all night long 

tachypneic in the 28 and not hypoxic down to 80%.  When patient arrived in the 

emergency room she received a breathing treatment and was placed on oxygen.








Physical exam :She is wheezing throughout and very dyspneic.


Patient's labs and x-rays are noncontributory.





CARDIAC :





 patient has a normal S1-S2 no murmurs.





Patient's extremity is negative for any signs of DVT.








Patient's electrolytes show that she has a decrease renal function around 27.  

History of CHF











SHe has a negative influenza screen.








Be admitted with a diagnosis of hypoxia rule out pneumonia.  She has been 

treated with a gram of Rocephin and 500 of Zithromax.





Cussed the case with Dr. Bender. 


Last Recorded V/S: 


 Last Vital Signs











Temp  98.0 F   20 02:47


 


Pulse  79   20 03:44


 


Resp  28 H  20 02:47


 


BP  139/64   20 03:44


 


Pulse Ox  95   20 02:47














- Orders/Labs/Meds


Orders: 


 Active Orders 24 hr











 Category Date Time Status


 


 Cardiac Monitoring [RC] .AS DIRECTED Care  20 02:54 Active


 


 EKG Documentation Completion [RC] STAT Care  20 02:55 Active


 


 Oxygen Therapy [RC] ASDIRECTED Care  20 02:54 Active


 


 RT Aerosol Therapy [RC] ASDIRECTED Care  20 02:42 Active


 


 CULTURE BLOOD [BC] Stat Lab  20 02:45 Received


 


 CULTURE BLOOD [BC] Stat Lab  20 03:25 Received


 


 Azithromycin [Zithromax] 500 mg Med  20 03:00 Active





 Sodium Chloride 0.9% [Normal Saline (AdvBag)] 250 ml   





 IV ONETIME   


 


 Blood Culture x2 Reflex Set [OM.PC] Stat Oth  20 03:14 Ordered








 Medication Orders





Azithromycin 500 mg/ Sodium (Chloride)  250 mls @ 250 mls/hr IV ONETIME MANE


   Last Admin: 20 04:04  Dose: 250 mls/hr








Labs: 


 Laboratory Tests











  20 Range/Units





  02:45 02:45 02:45 


 


WBC  9.10    (4.0-11.0)  K/uL


 


RBC  4.45    (4.30-5.90)  M/uL


 


Hgb  13.5    (12.0-16.0)  g/dL


 


Hct  42.6    (36.0-46.0)  %


 


MCV  95.7    (80.0-98.0)  fL


 


MCH  30.3    (27.0-32.0)  pg


 


MCHC  31.7    (31.0-37.0)  g/dL


 


RDW Std Deviation  47.8    (28.0-62.0)  fl


 


RDW Coeff of Jason  14    (11.0-15.0)  %


 


Plt Count  180    (150-400)  K/uL


 


MPV  9.70    (7.40-12.00)  fL


 


Neut % (Auto)  85.1 H    (48.0-80.0)  %


 


Lymph % (Auto)  9.2 L    (16.0-40.0)  %


 


Mono % (Auto)  5.5    (0.0-15.0)  %


 


Eos % (Auto)  0.0    (0.0-7.0)  %


 


Baso % (Auto)  0.2    (0.0-1.5)  %


 


Neut # (Auto)  7.7 H    (1.4-5.7)  K/uL


 


Lymph # (Auto)  0.8    (0.6-2.4)  K/uL


 


Mono # (Auto)  0.5    (0.0-0.8)  K/uL


 


Eos # (Auto)  0.0    (0.0-0.7)  K/uL


 


Baso # (Auto)  0.0    (0.0-0.1)  K/uL


 


Nucleated RBC %  0.0    /100WBC


 


Nucleated RBCs #  0    K/uL


 


INR   0.98   


 


Lactate     (0.20-2.00)  mmol/L


 


Sodium    135 L  (136-145)  mmol/L


 


Potassium    4.6  (3.5-5.1)  mmol/L


 


Chloride    97 L  ()  mmol/L


 


Carbon Dioxide    34.4 H  (21.0-32.0)  mmol/L


 


BUN    42 H  (7.0-18.0)  mg/dL


 


Creatinine    1.8 H  (0.6-1.0)  mg/dL


 


Est Cr Clr Drug Dosing    TNP  


 


Estimated GFR (MDRD)    27.0  ml/min


 


Glucose    129 H  ()  mg/dL


 


Calcium    8.8  (8.5-10.1)  mg/dL


 


Total Bilirubin    0.2  (0.2-1.0)  mg/dL


 


AST    21  (15-37)  IU/L


 


ALT    21  (14-63)  IU/L


 


Alkaline Phosphatase    112  ()  U/L


 


Troponin I    < 0.050  (0.000-0.056)  ng/mL


 


Total Protein    7.8  (6.4-8.2)  g/dL


 


Albumin    3.3 L  (3.4-5.0)  g/dL


 


Globulin    4.5 H  (2.6-4.0)  g/dL


 


Albumin/Globulin Ratio    0.7 L  (0.9-1.6)  


 


Urine Color     


 


Urine Appearance     


 


Urine pH     (5.0-8.0)  


 


Ur Specific Gravity     (1.001-1.035)  


 


Urine Protein     (NEGATIVE)  mg/dL


 


Urine Glucose (UA)     (NEGATIVE)  mg/dL


 


Urine Ketones     (NEGATIVE)  mg/dL


 


Urine Occult Blood     (NEGATIVE)  


 


Urine Nitrite     (NEGATIVE)  


 


Urine Bilirubin     (NEGATIVE)  


 


Urine Urobilinogen     (<2.0)  EU/dL


 


Ur Leukocyte Esterase     (NEGATIVE)  


 


Urine RBC     (0-2/HPF)  


 


Urine WBC     (0-5/HPF)  


 


Ur Epithelial Cells     (NONE-FEW)  


 


Urine Bacteria     (NEGATIVE)  


 


Urine Mucus     (NONE-MOD)  














  20 Range/Units





  02:45 03:35 


 


WBC    (4.0-11.0)  K/uL


 


RBC    (4.30-5.90)  M/uL


 


Hgb    (12.0-16.0)  g/dL


 


Hct    (36.0-46.0)  %


 


MCV    (80.0-98.0)  fL


 


MCH    (27.0-32.0)  pg


 


MCHC    (31.0-37.0)  g/dL


 


RDW Std Deviation    (28.0-62.0)  fl


 


RDW Coeff of Jason    (11.0-15.0)  %


 


Plt Count    (150-400)  K/uL


 


MPV    (7.40-12.00)  fL


 


Neut % (Auto)    (48.0-80.0)  %


 


Lymph % (Auto)    (16.0-40.0)  %


 


Mono % (Auto)    (0.0-15.0)  %


 


Eos % (Auto)    (0.0-7.0)  %


 


Baso % (Auto)    (0.0-1.5)  %


 


Neut # (Auto)    (1.4-5.7)  K/uL


 


Lymph # (Auto)    (0.6-2.4)  K/uL


 


Mono # (Auto)    (0.0-0.8)  K/uL


 


Eos # (Auto)    (0.0-0.7)  K/uL


 


Baso # (Auto)    (0.0-0.1)  K/uL


 


Nucleated RBC %    /100WBC


 


Nucleated RBCs #    K/uL


 


INR    


 


Lactate  0.6   (0.20-2.00)  mmol/L


 


Sodium    (136-145)  mmol/L


 


Potassium    (3.5-5.1)  mmol/L


 


Chloride    ()  mmol/L


 


Carbon Dioxide    (21.0-32.0)  mmol/L


 


BUN    (7.0-18.0)  mg/dL


 


Creatinine    (0.6-1.0)  mg/dL


 


Est Cr Clr Drug Dosing    


 


Estimated GFR (MDRD)    ml/min


 


Glucose    ()  mg/dL


 


Calcium    (8.5-10.1)  mg/dL


 


Total Bilirubin    (0.2-1.0)  mg/dL


 


AST    (15-37)  IU/L


 


ALT    (14-63)  IU/L


 


Alkaline Phosphatase    ()  U/L


 


Troponin I    (0.000-0.056)  ng/mL


 


Total Protein    (6.4-8.2)  g/dL


 


Albumin    (3.4-5.0)  g/dL


 


Globulin    (2.6-4.0)  g/dL


 


Albumin/Globulin Ratio    (0.9-1.6)  


 


Urine Color   YELLOW  


 


Urine Appearance   CLEAR  


 


Urine pH   6.0  (5.0-8.0)  


 


Ur Specific Gravity   >= 1.030  (1.001-1.035)  


 


Urine Protein   30 H  (NEGATIVE)  mg/dL


 


Urine Glucose (UA)   NEGATIVE  (NEGATIVE)  mg/dL


 


Urine Ketones   NEGATIVE  (NEGATIVE)  mg/dL


 


Urine Occult Blood   LARGE H  (NEGATIVE)  


 


Urine Nitrite   NEGATIVE  (NEGATIVE)  


 


Urine Bilirubin   NEGATIVE  (NEGATIVE)  


 


Urine Urobilinogen   0.2  (<2.0)  EU/dL


 


Ur Leukocyte Esterase   NEGATIVE  (NEGATIVE)  


 


Urine RBC   3-5  (0-2/HPF)  


 


Urine WBC   0-1  (0-5/HPF)  


 


Ur Epithelial Cells   OCCASIONAL  (NONE-FEW)  


 


Urine Bacteria   RARE  (NEGATIVE)  


 


Urine Mucus   RARE  (NONE-MOD)  











Meds: 


Medications











Generic Name Dose Route Start Last Admin





  Trade Name Freq  PRN Reason Stop Dose Admin


 


Azithromycin 500 mg/ Sodium  250 mls @ 250 mls/hr  20 03:00  20 04:

04





  Chloride  IV   250 mls/hr





  ONETIME MANE   Administration





     





     





     





     














Discontinued Medications














Generic Name Dose Route Start Last Admin





  Trade Name Freq  PRN Reason Stop Dose Admin


 


Albuterol/Ipratropium  3 ml  20 02:42  20 02:50





  Duoneb 3.0-0.5 Mg/3 Ml  NEB  20 02:43  3 ml





  ONETIME ONE   Administration





     





     





     





     


 


Sodium Chloride  1,000 mls @ 999 mls/hr  20 02:54  20 03:00





  Normal Saline  IV  20 03:54  Not Given





  BOLUS ONE   





     





     





     





     


 


Ceftriaxone Sodium 1 gm/  50 mls @ 100 mls/hr  20 02:58  20 03:39





  Sodium Chloride  IV  20 03:27  100 mls/hr





  ONETIME ONE   Administration





     





     





     





     


 


Sodium Chloride  500 mls @ 999 mls/hr  20 02:59  20 02:55





  Normal Saline  IV  20 03:29  999 mls/hr





  .BOLUS ONE   Administration





     





     





     





     














Departure





- Departure


Time of Disposition: 04:38


Disposition: Admitted As Inpatient 64


Condition: Fair


Clinical Impression: 


 Hypoxia, Pneumonia








- Discharge Information


Referrals: 


Cecilio Haynes MD [Primary Care Provider] - 


Forms:  ED Department Discharge





Sepsis Event Note





- Evaluation


Sepsis Screening Result: Possible Severe Sepsis Risk





- Focused Exam


Vital Signs: 


 Vital Signs











  Temp Pulse Resp BP Pulse Ox


 


 20 03:44   79   139/64 


 


 20 03:15   77   138/61 


 


 20 02:47  98.0 F  85  28 H  95/43 L  95











Date Exam was Performed: 20


Time Exam was Performed: 04:33





- My Orders


Last 24 Hours: 


My Active Orders





20 02:42


RT Aerosol Therapy [RC] ASDIRECTED 





20 02:45


CULTURE BLOOD [BC] Stat 





20 02:54


Cardiac Monitoring [RC] .AS DIRECTED 


Oxygen Therapy [RC] ASDIRECTED 





20 02:55


EKG Documentation Completion [RC] STAT 





20 03:00


Azithromycin [Zithromax] 500 mg   Sodium Chloride 0.9% [Normal Saline (AdvBag)] 

250 ml IV ONETIME 





20 03:14


Blood Culture x2 Reflex Set [OM.PC] Stat 





20 03:25


CULTURE BLOOD [BC] Stat 














- Assessment/Plan


Last 24 Hours: 


My Active Orders





20 02:42


RT Aerosol Therapy [RC] ASDIRECTED 





20 02:45


CULTURE BLOOD [BC] Stat 





20 02:54


Cardiac Monitoring [RC] .AS DIRECTED 


Oxygen Therapy [RC] ASDIRECTED 





20 02:55


EKG Documentation Completion [RC] STAT 





20 03:00


Azithromycin [Zithromax] 500 mg   Sodium Chloride 0.9% [Normal Saline (AdvBag)] 

250 ml IV ONETIME 





20 03:14


Blood Culture x2 Reflex Set [OM.PC] Stat 





20 03:25


CULTURE BLOOD [BC] Stat

## 2020-01-03 NOTE — PCM.HP.2
H&P History of Present Illness





- General


Date of Service: 20


Admit Problem/Dx: 


 Admission Diagnosis/Problem





Admission Diagnosis/Problem      Hypoxia











- History of Present Illness


Initial Comments - Free Text/Narative: 


Patient is a 79-year-old female with past medical history of coronary artery 

disease, CVA, congestive heart failur,e CKD : presenting yesterday from local 

nursing home with increasing dyspnea , weakness and subjective fevers,  patient 

is oxygen dependent at baseline.





ED course: Patient found to have productive cough with sputum production,  

shortness of breath and increasing weakness.  P/E suggest shortness of breath 

with wheezing.


Of note patient was on steroids 50 mg daily for the past 3-days  and 

doxycycline for bronchitis.  However last night was found to be hypoxic with O2 

saturations at 80%





Received DuoNeb treatment/supplemental oxygen.





ABG was performed pH is 7.1.  PCO2 98.  O2 88. 





Admitted for hypercapnic hypoxic respiratory failure





A.m. patient was initiated on BiPAP.





Repeat ABG: pH 7.26/CO2 72/HCO3 32





Subsequent ABG this afternoon; pH 7.217/CO2 82/HCO3 33





Bedside: Transferred to ICU.  Gustavo but not giving any history; unsure of 

pt.s baseline considering history of dementia. 





  ** no pain


Pain Score (Numeric/FACES): 0





- Related Data


Allergies/Adverse Reactions: 


 Allergies











Allergy/AdvReac Type Severity Reaction Status Date / Time


 


codeine Allergy  Other Verified 20 06:00


 


latex Allergy  Other Verified 20 06:00


 


phosphates Allergy  Other Uncoded 20 06:00











Home Medications: 


 Home Meds





Clopidogrel Bisulfate [Clopidogrel] 75 mg PO DAILY 16 [History]


Metoprolol Succinate 25 mg PO DAILY 16 [History]


Nitroglycerin [Nitrostat] 0.4 mg SL .EVERY 5 MINUTES PRN 16 [History]


atorvaSTATin [Lipitor] 20 mg PO BEDTIME 16 [History]


Aspirin 81 mg PO DAILY 16 [History]


Multivit with Minerals/Lutein [Vision Plus Lutein Vitamin] 1 tab PO DAILY  [History]


Ferrous Sulfate 325 mg PO TIDMEALS #90 tablet 16 [Rx]


Acetaminophen 500 mg PO Q4H PRN 17 [History]


Carboxymethylcellulose Sodium [Refresh Tears] 1 drop EYEBOTH ASDIRECTED PRN  [History]


Magnesium Hydroxide [Milk of Magnesia] 30 ml PO DAILY PRN 17 [History]


Albuterol Sulfate 2.5 mg INH Q4H PRN 18 [History]


Simethicone 80 mg PO Q12H PRN 18 [History]


Bumetanide 1 mg PO DAILY #0 18 [Rx]


Chlorhexidine Gluconate [Peridex 0.12% Rinse] 15 ml MM ASDIRECTED 20 [

History]


Citalopram Hydrobromide [Celexa] 20 mg PO DAILY 20 [History]


Donepezil HCl 23 mg PO DAILY 20 [History]


Doxycycline [Doxycycline Monohydrate] 100 mg PO BID 20 [History]


Ibuprofen 200 mg PO ASDIRECTED PRN 20 [History]


Mag Hydrox/Aluminum Hyd/Simeth [Mylanta Maximum Strength Liq] 355 ml PO 

ASDIRECTED PRN 20 [History]


Magnesium Hydroxide [Milk of Magnesia] 30 ml PO DAILY PRN 20 [History]


Omeprazole 20 mg PO DAILY 20 [History]


QUEtiapine Fumarate [Seroquel] 50 mg PO BID 20 [History]


predniSONE [Prednisone] 50 mg PO DAILY 20 [History]


traMADol [Ultram] 50 mg PO ASDIRECTED PRN 20 [History]











Past Medical History





- Past Health History


Medical/Surgical History: Denies Medical/Surgical History


HEENT History: Reports: Cataract


Cardiovascular History: Reports: Arrhythmia, Blood Clots/VTE/DVT, CAD, Heart 

Failure, Pacemaker, PVD


Other Cardiovascular History: DVT


Respiratory History: Reports: COPD, Other (See Below)


Other Respiratory History: Emphysema


Gastrointestinal History: Reports: GERD


Other Gastrointestinal History: "heartburn"


Genitourinary History: Reports: Chronic Renal Insuffiency


Other Genitourinary History: Pt came in from [place], CNA reported vaginal 

bleeding that started after breakfast today then went on to be heavy with blood 

clots through the afternoon.


OB/GYN History: Reports: Pregnancy


Musculoskeletal History: Reports: Arthritis, Other (See Below)


Other Musculoskeletal History: chronic hip pain left more than the right


Neurological History: Reports: CVA, Migraines


Other Neuro History: Encephalopathy, facial weakness/hemiphlegia/hemiparesis 

following cerebral infarction,


Psychiatric History: Reports: Anxiety, Depression


Other Psychiatric History: Violent Behavioral Outbursts


Endocrine/Metabolic History: Reports: None


Hematologic History: Reports: None


Immunologic History: Reports: None


Oncologic (Cancer) History: Reports: None


Dermatologic History: Reports: Other (See Below)


Other Dermatologic History: frequent yeast to abdominal folds and groins





- Infectious Disease History


Infectious Disease History: Reports: Chicken Pox, Measles, Meningitis





- Past Surgical History


Head Surgeries/Procedures: Reports: None


Cardiovascular Surgical History: Reports: Other (See Below)


Female  Surgical History: Reports:  Section


Dermatological Surgical History: Reports: None





Social & Family History





- Family History


Family Medical History: Noncontributory


Neurological: Reports: CVA


Psychiatric: Reports: Abuse, Victim of, Anxiety


Endocrine/Metabolic: Reports: Diabetes, type II


Immunologic: Reports: None





- Tobacco Use


Smoking Status *Q: Unknown Ever Smoked





- Caffeine Use


Caffeine Use: Reports: None


Caffeine Use Comment: unknown, pt chosing not to talk





- Recreational Drug Use


Recreational Drug Use: No





- Living Situation & Occupation


Living situation: Reports: with Family


Occupation: Unemployed





H&P Review of Systems





- Review of Systems:


Review Of Systems: See Below


General: Denies: Fever, Chills


Pulmonary: Reports: Shortness of Breath, Cough.  Denies: Sputum


Gastrointestinal: Reports: No Symptoms


Genitourinary: Reports: No Symptoms





Exam





- Exam


Exam: See Below





- Vital Signs


Vital Signs: 


 Last Vital Signs











Temp  99.7 F   20 12:28


 


Pulse  75   20 12:28


 


Resp  18   20 12:28


 


BP  139/61   20 12:28


 


Pulse Ox  96   20 12:28











Weight: 171 lb 14.4 oz





- Exam


Quality Assessment: Supplemental Oxygen


HEENT: EOMI, Mucosa Moist & Pink


Lungs: Other (tight breath sounds ; shallow breathing ; on Bipap ; interval 

improvemnt of wheezing )


Cardiovascular: Regular Rhythm


GI/Abdominal Exam: Soft, Non-Tender


Extremities: Normal Inspection, No Pedal Edema


Skin: Warm, Dry


Neuro Extensive - Mental Status: Withdraws to Pain, Other (awakens and nods but 

appears tired )





- Patient Data


Lab Results Last 24 hrs: 


 Laboratory Results - last 24 hr











  20 Range/Units





  02:45 02:45 02:45 


 


WBC  9.10    (4.0-11.0)  K/uL


 


RBC  4.45    (4.30-5.90)  M/uL


 


Hgb  13.5    (12.0-16.0)  g/dL


 


Hct  42.6    (36.0-46.0)  %


 


MCV  95.7    (80.0-98.0)  fL


 


MCH  30.3    (27.0-32.0)  pg


 


MCHC  31.7    (31.0-37.0)  g/dL


 


RDW Std Deviation  47.8    (28.0-62.0)  fl


 


RDW Coeff of Jason  14    (11.0-15.0)  %


 


Plt Count  180    (150-400)  K/uL


 


MPV  9.70    (7.40-12.00)  fL


 


Neut % (Auto)  85.1 H    (48.0-80.0)  %


 


Lymph % (Auto)  9.2 L    (16.0-40.0)  %


 


Mono % (Auto)  5.5    (0.0-15.0)  %


 


Eos % (Auto)  0.0    (0.0-7.0)  %


 


Baso % (Auto)  0.2    (0.0-1.5)  %


 


Neut # (Auto)  7.7 H    (1.4-5.7)  K/uL


 


Lymph # (Auto)  0.8    (0.6-2.4)  K/uL


 


Mono # (Auto)  0.5    (0.0-0.8)  K/uL


 


Eos # (Auto)  0.0    (0.0-0.7)  K/uL


 


Baso # (Auto)  0.0    (0.0-0.1)  K/uL


 


Nucleated RBC %  0.0    /100WBC


 


Nucleated RBCs #  0    K/uL


 


INR   0.98   


 


ABG pH     (7.35-7.45)  


 


ABG pCO2     (35-45)  mmHG


 


ABG pO2     ()  mmHG


 


ABG HCO3     (22-26)  mEq/L


 


ABG Total CO2     


 


ABG Base Excess     (-2.0-2.0)  


 


Lactate     (0.20-2.00)  mmol/L


 


Sodium    135 L  (136-145)  mmol/L


 


Potassium    4.6  (3.5-5.1)  mmol/L


 


Chloride    97 L  ()  mmol/L


 


Carbon Dioxide    34.4 H  (21.0-32.0)  mmol/L


 


BUN    42 H  (7.0-18.0)  mg/dL


 


Creatinine    1.8 H  (0.6-1.0)  mg/dL


 


Est Cr Clr Drug Dosing    TNP  


 


Estimated GFR (MDRD)    27.0  ml/min


 


Glucose    129 H  ()  mg/dL


 


Calcium    8.8  (8.5-10.1)  mg/dL


 


Total Bilirubin    0.2  (0.2-1.0)  mg/dL


 


AST    21  (15-37)  IU/L


 


ALT    21  (14-63)  IU/L


 


Alkaline Phosphatase    112  ()  U/L


 


Troponin I    < 0.050  (0.000-0.056)  ng/mL


 


Total Protein    7.8  (6.4-8.2)  g/dL


 


Albumin    3.3 L  (3.4-5.0)  g/dL


 


Globulin    4.5 H  (2.6-4.0)  g/dL


 


Albumin/Globulin Ratio    0.7 L  (0.9-1.6)  


 


Urine Color     


 


Urine Appearance     


 


Urine pH     (5.0-8.0)  


 


Ur Specific Gravity     (1.001-1.035)  


 


Urine Protein     (NEGATIVE)  mg/dL


 


Urine Glucose (UA)     (NEGATIVE)  mg/dL


 


Urine Ketones     (NEGATIVE)  mg/dL


 


Urine Occult Blood     (NEGATIVE)  


 


Urine Nitrite     (NEGATIVE)  


 


Urine Bilirubin     (NEGATIVE)  


 


Urine Urobilinogen     (<2.0)  EU/dL


 


Ur Leukocyte Esterase     (NEGATIVE)  


 


Urine RBC     (0-2/HPF)  


 


Urine WBC     (0-5/HPF)  


 


Ur Epithelial Cells     (NONE-FEW)  


 


Urine Bacteria     (NEGATIVE)  


 


Urine Mucus     (NONE-MOD)  














  20 Range/Units





  02:45 03:35 05:28 


 


WBC     (4.0-11.0)  K/uL


 


RBC     (4.30-5.90)  M/uL


 


Hgb     (12.0-16.0)  g/dL


 


Hct     (36.0-46.0)  %


 


MCV     (80.0-98.0)  fL


 


MCH     (27.0-32.0)  pg


 


MCHC     (31.0-37.0)  g/dL


 


RDW Std Deviation     (28.0-62.0)  fl


 


RDW Coeff of Jason     (11.0-15.0)  %


 


Plt Count     (150-400)  K/uL


 


MPV     (7.40-12.00)  fL


 


Neut % (Auto)     (48.0-80.0)  %


 


Lymph % (Auto)     (16.0-40.0)  %


 


Mono % (Auto)     (0.0-15.0)  %


 


Eos % (Auto)     (0.0-7.0)  %


 


Baso % (Auto)     (0.0-1.5)  %


 


Neut # (Auto)     (1.4-5.7)  K/uL


 


Lymph # (Auto)     (0.6-2.4)  K/uL


 


Mono # (Auto)     (0.0-0.8)  K/uL


 


Eos # (Auto)     (0.0-0.7)  K/uL


 


Baso # (Auto)     (0.0-0.1)  K/uL


 


Nucleated RBC %     /100WBC


 


Nucleated RBCs #     K/uL


 


INR     


 


ABG pH    7.170 L*  (7.35-7.45)  


 


ABG pCO2    92 H  (35-45)  mmHG


 


ABG pO2    88  ()  mmHG


 


ABG HCO3    34 H  (22-26)  mEq/L


 


ABG Total CO2    31.7  


 


ABG Base Excess    2.0  (-2.0-2.0)  


 


Lactate  0.6    (0.20-2.00)  mmol/L


 


Sodium     (136-145)  mmol/L


 


Potassium     (3.5-5.1)  mmol/L


 


Chloride     ()  mmol/L


 


Carbon Dioxide     (21.0-32.0)  mmol/L


 


BUN     (7.0-18.0)  mg/dL


 


Creatinine     (0.6-1.0)  mg/dL


 


Est Cr Clr Drug Dosing     


 


Estimated GFR (MDRD)     ml/min


 


Glucose     ()  mg/dL


 


Calcium     (8.5-10.1)  mg/dL


 


Total Bilirubin     (0.2-1.0)  mg/dL


 


AST     (15-37)  IU/L


 


ALT     (14-63)  IU/L


 


Alkaline Phosphatase     ()  U/L


 


Troponin I     (0.000-0.056)  ng/mL


 


Total Protein     (6.4-8.2)  g/dL


 


Albumin     (3.4-5.0)  g/dL


 


Globulin     (2.6-4.0)  g/dL


 


Albumin/Globulin Ratio     (0.9-1.6)  


 


Urine Color   YELLOW   


 


Urine Appearance   CLEAR   


 


Urine pH   6.0   (5.0-8.0)  


 


Ur Specific Gravity   >= 1.030   (1.001-1.035)  


 


Urine Protein   30 H   (NEGATIVE)  mg/dL


 


Urine Glucose (UA)   NEGATIVE   (NEGATIVE)  mg/dL


 


Urine Ketones   NEGATIVE   (NEGATIVE)  mg/dL


 


Urine Occult Blood   LARGE H   (NEGATIVE)  


 


Urine Nitrite   NEGATIVE   (NEGATIVE)  


 


Urine Bilirubin   NEGATIVE   (NEGATIVE)  


 


Urine Urobilinogen   0.2   (<2.0)  EU/dL


 


Ur Leukocyte Esterase   NEGATIVE   (NEGATIVE)  


 


Urine RBC   3-5   (0-2/HPF)  


 


Urine WBC   0-1   (0-5/HPF)  


 


Ur Epithelial Cells   OCCASIONAL   (NONE-FEW)  


 


Urine Bacteria   RARE   (NEGATIVE)  


 


Urine Mucus   RARE   (NONE-MOD)  














  20 Range/Units





  08:30 12:14 13:07 


 


WBC     (4.0-11.0)  K/uL


 


RBC     (4.30-5.90)  M/uL


 


Hgb     (12.0-16.0)  g/dL


 


Hct     (36.0-46.0)  %


 


MCV     (80.0-98.0)  fL


 


MCH     (27.0-32.0)  pg


 


MCHC     (31.0-37.0)  g/dL


 


RDW Std Deviation     (28.0-62.0)  fl


 


RDW Coeff of Jason     (11.0-15.0)  %


 


Plt Count     (150-400)  K/uL


 


MPV     (7.40-12.00)  fL


 


Neut % (Auto)     (48.0-80.0)  %


 


Lymph % (Auto)     (16.0-40.0)  %


 


Mono % (Auto)     (0.0-15.0)  %


 


Eos % (Auto)     (0.0-7.0)  %


 


Baso % (Auto)     (0.0-1.5)  %


 


Neut # (Auto)     (1.4-5.7)  K/uL


 


Lymph # (Auto)     (0.6-2.4)  K/uL


 


Mono # (Auto)     (0.0-0.8)  K/uL


 


Eos # (Auto)     (0.0-0.7)  K/uL


 


Baso # (Auto)     (0.0-0.1)  K/uL


 


Nucleated RBC %     /100WBC


 


Nucleated RBCs #     K/uL


 


INR     


 


ABG pH  7.261 L  7.217 L   (7.35-7.45)  


 


ABG pCO2  72 H  82 H   (35-45)  mmHG


 


ABG pO2  63 L  73 L   ()  mmHG


 


ABG HCO3  32 H  33 H   (22-26)  mEq/L


 


ABG Total CO2  30.0  31.2   


 


ABG Base Excess  3.1 H  2.9 H   (-2.0-2.0)  


 


Lactate    0.8  (0.20-2.00)  mmol/L


 


Sodium     (136-145)  mmol/L


 


Potassium     (3.5-5.1)  mmol/L


 


Chloride     ()  mmol/L


 


Carbon Dioxide     (21.0-32.0)  mmol/L


 


BUN     (7.0-18.0)  mg/dL


 


Creatinine     (0.6-1.0)  mg/dL


 


Est Cr Clr Drug Dosing     


 


Estimated GFR (MDRD)     ml/min


 


Glucose     ()  mg/dL


 


Calcium     (8.5-10.1)  mg/dL


 


Total Bilirubin     (0.2-1.0)  mg/dL


 


AST     (15-37)  IU/L


 


ALT     (14-63)  IU/L


 


Alkaline Phosphatase     ()  U/L


 


Troponin I     (0.000-0.056)  ng/mL


 


Total Protein     (6.4-8.2)  g/dL


 


Albumin     (3.4-5.0)  g/dL


 


Globulin     (2.6-4.0)  g/dL


 


Albumin/Globulin Ratio     (0.9-1.6)  


 


Urine Color     


 


Urine Appearance     


 


Urine pH     (5.0-8.0)  


 


Ur Specific Gravity     (1.001-1.035)  


 


Urine Protein     (NEGATIVE)  mg/dL


 


Urine Glucose (UA)     (NEGATIVE)  mg/dL


 


Urine Ketones     (NEGATIVE)  mg/dL


 


Urine Occult Blood     (NEGATIVE)  


 


Urine Nitrite     (NEGATIVE)  


 


Urine Bilirubin     (NEGATIVE)  


 


Urine Urobilinogen     (<2.0)  EU/dL


 


Ur Leukocyte Esterase     (NEGATIVE)  


 


Urine RBC     (0-2/HPF)  


 


Urine WBC     (0-5/HPF)  


 


Ur Epithelial Cells     (NONE-FEW)  


 


Urine Bacteria     (NEGATIVE)  


 


Urine Mucus     (NONE-MOD)  











Result Diagrams: 


 20 02:45





 20 02:45


[facility] Results Last 24 hrs: 


 Microbiology











 20 02:47 Influenza Type A Antigen Screen - Final





 Nasopharyngeal Swab    NEGATIVE INFLUENZA A VIRUS AG





    REFERENCE RANGE: NEGATIVE





 Influenza Type B Antigen Screen - Final





    NEGATIVE INFLUENZA B VIRUS AG





    REFERENCE RANGE: NEGATIVE














Sepsis Event Note





- Evaluation


Sepsis Screening Result: No Definite Risk





- Focused Exam


Vital Signs: 


 Vital Signs











  Temp Pulse Resp BP Pulse Ox


 


 20 12:28  99.7 F  75  18  139/61  96


 


 20 07:30  98.3 F  80  22 H  122/55 L  97


 


 20 06:00  98.2 F  90  24 H  150/65 H  99


 


 20 03:44   79   139/64 


 


 20 03:15   77   138/61 


 


 20 02:47  98.0 F  85  28 H  95/43 L  95











Date Exam was Performed: 20


Time Exam was Performed: 16:48


Problem List Initiated/Reviewed/Updated: Yes


Orders Last 24hrs: 


 Active Orders 24 hr











 Category Date Time Status


 


 Admission Status [Patient Status] [ADT] Stat ADT  20 04:42 Active


 


 Transfer Patient (Change bed) [ADT] Routine ADT  20 08:57 Ordered


 


 BIPAP [RT BiPAP/CPAP] [RC] ASDIRECTED Care  20 06:04 Active


 


 Cardiac Monitoring [RC] Q8H Care  20 02:54 Active


 


 Overnight Pulse Oximetry [RC] Click to Edit Care  20 06:37 Active


 


 Oxygen Therapy [RC] ASDIRECTED Care  20 02:54 Active


 


 RT Aerosol Therapy [RC] ASDIRECTED Care  20 09:47 Active


 


 Telemetry Monitoring [Cardiac Monitoring] [RC] .AS Care  20 06:37 Active





 DIRECTED   


 


 Vital Signs [RC] Q1H Care  20 06:36 Active


 


 NPO Now [Nothing per Oral Now Diet] [DIET] Diet  20 Lunch Active


 


 ABG [BLOOD GAS ARTERIAL] [BG] Routine Lab  20 15:00 Ordered


 


 CULTURE BLOOD [BC] Stat Lab  20 02:45 Received


 


 CULTURE BLOOD [BC] Stat Lab  20 03:25 Received


 


 LACTIC ACID,WHOLE BLOOD [BG] Q5H Lab  20 18:00 Ordered


 


 Albuterol/Ipratropium [DuoNeb 3.0-0.5 MG/3 ML] Med  20 10:00 Active





 3 ml NEB Q4HRRT   


 


 Azithromycin [Zithromax] 500 mg Med  20 03:00 Active





 Sodium Chloride 0.9% [Normal Saline (AdvBag)] 250 ml   





 IV ONETIME   


 


 Azithromycin [Zithromax] 500 mg Med  20 03:00 Active





 Sodium Chloride 0.9% [Normal Saline (AdvBag)] 250 ml   





 IV Q24H   


 


 Clopidogrel [Plavix] Med  20 10:00 Active





 75 mg PO DAILY   


 


 Heparin Sodium Med  20 10:00 Active





 5,000 units SUBCUT Q8H   


 


 Pantoprazole [ProTONIX IV***] 40 mg Med  20 10:00 Active





 Sodium Chloride 0.9% [Normal Saline] 10 ml   





 IV Q24H   


 


 Sodium Chloride 0.9% [Normal Saline] 1,000 ml Med  20 05:15 Active





 IV ASDIRECTED   


 


 cefTRIAXone [Rocephin in Dextrose,Iso-Osm 1 GM/50 ML] 1 Med  20 02:00 

Active





 gm   





 Premix Bag 1 bag   





 IV Q24H   


 


 methylPREDNISolone Sod Succ [Solu-MEDROL] Med  20 14:00 Active





 125 mg IVPUSH Q8H   


 


 Blood Culture x2 Reflex Set [OM.PC] Stat Oth  20 03:14 Ordered


 


 Pulse Oximetry Continuous Monitoring [OM.PC] Routine Oth  20 06:37 

Ordered


 


 Code Status [Resuscitation Status] Routine Resus Stat  20 09:42 Ordered








 Medication Orders





Albuterol/Ipratropium (Duoneb 3.0-0.5 Mg/3 Ml)  3 ml NEB Q4HRRT [facility]


   Last Admin: 20 12:59  Dose: 3 ml


   Admin: 20 10:43  Dose: 3 ml


Clopidogrel Bisulfate (Plavix)  75 mg PO DAILY [facility]


   Last Admin: 20 10:29  Dose: 75 mg


Heparin Sodium (Porcine) (Heparin Sodium)  5,000 units SUBCUT Q8H [facility]


   Last Admin: 20 10:19  Dose: 5,000 units


Azithromycin 500 mg/ Sodium (Chloride)  250 mls @ 250 mls/hr IV ONETIME [facility]


   Last Admin: 20 04:04  Dose: 250 mls/hr


Sodium Chloride (Normal Saline)  1,000 mls @ 125 mls/hr IV ASDIRECTED [facility]


   Last Admin: 20 04:45  Dose: 125 mls/hr


Pantoprazole Sodium 40 mg/ (Sodium Chloride)  10 mls @ 300 mls/hr IV Q24H [facility]


   Last Admin: 20 10:20  Dose: 300 mls/hr


Ceftriaxone Sodium/Dextrose 1 (gm/ Premix)  50 mls @ 100 mls/hr IV Q24H [facility]


Azithromycin 500 mg/ Sodium (Chloride)  250 mls @ 250 mls/hr IV Q24H [facility]


Methylprednisolone Sodium Succinate (Solu-Medrol)  125 mg IVPUSH Q8H [facility]








Assessment/Plan Comment:: 


Assessment: 





1. Hypercapnic respiratory failure on Bipap


2. PMH: dementia,CHF, DM, CHF, CVA (possibly COPD  per chart review) 











Plan:








Admit to inpatient. Full code for now (awaiting call back from son)., DVT: 

heparin q 8, GI: pantoprazole 40 . Up with assistance. Diet: diabetic 





1. Respiratory failure: continue respiratory support: currently on BiPap  , 

repeat ABG in afternoon; low threshold for intubation. Continue IV abx: CTX + 

azithromycin. IV methylprednisone continued. Duo-nebs q4hrs. 


   CXR: no acute infiltrates; +cardiomegaly. troponin negative . Continue to 

monitor.





Transferred to ICU for continue monitoring. We appreciate eICU recommendations 

regarding need for increased ventilatory support.  





Afternoon events: Repeat ABG suggest persistence of hypercapnic respiratory 

failure despite increased pressure support on vent; discussed case with eICU; 

recommended immediate intubation; Intubation performed; CXR performed w/ tube 

in appropriate location/in-situ . co2 improved w. repeat ABG. Auscultation: air 

movement appropriate in lung fields.  Discussed case with [facility] 

provider; agreed to accept pt. ; pt. transferred via air-ambulance. pt. stable 

at transfer.

## 2020-01-03 NOTE — CR
Chest: Portable view of the chest was obtained.

 

Comparison: No previous chest x-ray.

 

Endotracheal tube is seen.  Tip lies approximately 2.4 cm above the 

clive.  Pacemaker is noted.  Previous cervical spine surgery is 

noted.  Heart is enlarged.  Lungs show no acute parenchymal change.  

Minimal discoid atelectasis is seen within the left base.  Bony 

structures are grossly intact.  Calcific tendinitis is noted within 

the left shoulder.

 

Impression:

1.  Tip of endotracheal tube 2.4 cm above the clive.

2.  Mild cardiomegaly.

3.  Other nonacute findings as noted above.

 

Diagnostic code #3

 

This report was dictated in [place] Standard Time

## 2020-01-24 NOTE — US
Left lower extremity deep venous ultrasound: Duplex and color Doppler 

evaluation was obtained the left common femoral, superficial femoral, 

popliteal, posterior tibial and peroneal veins.

 

Findings: Normal color Doppler flow and compression is seen.

 

Impression:

1.  No evidence of deep venous thrombosis within the left lower 

extremity.

 

Diagnostic code #1

 

This report was dictated in [place] Standard Time

## 2020-01-24 NOTE — EDM.PDOC
ED HPI GENERAL MEDICAL PROBLEM





- General


Chief Complaint: General


Stated Complaint: POSSIBLE BLOOD CLOT


Time Seen by Provider: 20 08:00





- History of Present Illness


INITIAL COMMENTS - FREE TEXT/NARRATIVE: 


From nursing home for red lump on left medial calf that is been there for a few 

days increasingly painful, no fevers chills no spontaneous drainage is no 

record of injury.  Patient has dementia and unable to give account of what 

happened when it started.  Patient appears comfortable however states that is 

tender when the area is palpated.  COPD on oxygen all the time and denies any 

other new systemic issues.  Patient sent here to rule out cellulitis versus DVT


Sent here


  ** Left Lower Leg


Pain Score (Numeric/FACES): 9





- Related Data


 Allergies











Allergy/AdvReac Type Severity Reaction Status Date / Time


 


codeine Allergy  Other Verified 20 06:00


 


latex Allergy  Other Verified 20 06:00


 


phosphates Allergy  Other Uncoded 20 06:00











Home Meds: 


 Home Meds





Clopidogrel Bisulfate [Clopidogrel] 75 mg PO DAILY 16 [History]


Metoprolol Succinate 25 mg PO DAILY 16 [History]


Nitroglycerin [Nitrostat] 0.4 mg SL .EVERY 5 MINUTES PRN 16 [History]


atorvaSTATin [Lipitor] 20 mg PO BEDTIME 16 [History]


Aspirin 81 mg PO DAILY 16 [History]


Multivit with Minerals/Lutein [Vision Plus Lutein Vitamin] 1 tab PO DAILY  [History]


Ferrous Sulfate 325 mg PO TIDMEALS #90 tablet 16 [Rx]


Acetaminophen 500 mg PO Q4H PRN 17 [History]


Carboxymethylcellulose Sodium [Refresh Tears] 1 drop EYEBOTH Q12H PRN 17 [

History]


Magnesium Hydroxide [Milk of Magnesia] 30 ml PO DAILY PRN 17 [History]


Albuterol Sulfate 2.5 mg INH Q4H PRN 18 [History]


Simethicone 80 mg PO Q12H PRN 18 [History]


Bumetanide 1 mg PO DAILY #0 18 [Rx]


Chlorhexidine Gluconate [Peridex 0.12% Rinse] 15 ml SSPIT BID 20 [History]


Citalopram Hydrobromide [Celexa] 20 mg PO DAILY 20 [History]


Donepezil HCl 23 mg PO DAILY 20 [History]


Ibuprofen 200 mg PO ASDIRECTED PRN 20 [History]


Mag Hydrox/Aluminum Hyd/Simeth [Mylanta Maximum Strength Liq] 30 ml PO DAILY 

PRN 20 [History]


Omeprazole 20 mg PO BEDTIME 20 [History]


QUEtiapine Fumarate [Seroquel] 50 mg PO BID 20 [History]


traMADol [Ultram] 50 mg PO Q6H PRN 20 [History]


Brimonidine Tartrate [Alphagan P 0.1% Ophth Soln] 1 drop EYERT QAM 20 [

History]


Carbamide Peroxide [Debrox 6.5% Otic Soln] 3 drop EARBOTH DAILY PRN 20 [

History]


Menthol/Zinc Oxide [Gold Bond Medicated Body Powd] 0 gm TOP ASDIRECTED PRN  [History]











Past Medical History





- Past Health History


Medical/Surgical History: Denies Medical/Surgical History


HEENT History: Reports: Cataract


Cardiovascular History: Reports: Arrhythmia, Blood Clots/VTE/DVT, CAD, Heart 

Failure, Pacemaker, PVD


Other Cardiovascular History: DVT


Respiratory History: Reports: COPD, Other (See Below)


Other Respiratory History: Emphysema


Gastrointestinal History: Reports: GERD


Other Gastrointestinal History: "heartburn"


Genitourinary History: Reports: Chronic Renal Insuffiency


Other Genitourinary History: Pt came in from [place], CNA reported vaginal 

bleeding that started after breakfast today then went on to be heavy with blood 

clots through the afternoon.


OB/GYN History: Reports: Pregnancy


Musculoskeletal History: Reports: Arthritis, Other (See Below)


Other Musculoskeletal History: chronic hip pain left more than the right


Neurological History: Reports: CVA, Migraines


Other Neuro History: Encephalopathy, facial weakness/hemiphlegia/hemiparesis 

following cerebral infarction,


Psychiatric History: Reports: Anxiety, Depression


Other Psychiatric History: Violent Behavioral Outbursts


Endocrine/Metabolic History: Reports: None


Hematologic History: Reports: None


Immunologic History: Reports: None


Oncologic (Cancer) History: Reports: None


Dermatologic History: Reports: Other (See Below)


Other Dermatologic History: frequent yeast to abdominal folds and groins





- Infectious Disease History


Infectious Disease History: Reports: Chicken Pox, Measles, Meningitis





- Past Surgical History


Head Surgeries/Procedures: Reports: None


Cardiovascular Surgical History: Reports: Other (See Below)


Female  Surgical History: Reports:  Section


Dermatological Surgical History: Reports: None





Social & Family History





- Family History


Family Medical History: Noncontributory


Neurological: Reports: CVA


Psychiatric: Reports: Abuse, Victim of, Anxiety


Endocrine/Metabolic: Reports: Diabetes, type II


Immunologic: Reports: None





- Caffeine Use


Caffeine Use: Reports: None


Caffeine Use Comment: unknown, pt chosing not to talk





- Living Situation & Occupation


Living situation: Reports: with Family


Occupation: Unemployed





ED ROS GENERAL





- Review of Systems


Review Of Systems: See Below


Constitutional: Reports: No Symptoms


HEENT: Reports: No Symptoms


Respiratory: Reports: Shortness of Breath, Other (Shortness of breath is 

baseline)


Cardiovascular: Reports: No Symptoms


GI/Abdominal: Reports: No Symptoms


Musculoskeletal: Reports: No Symptoms


Skin: Reports: Rash, Erythema, Change in Color


Neurological: Reports: No Symptoms





ED EXAM, GENERAL





- Physical Exam


Exam: See Below


Exam Limited By: Other (Dementia)


General Appearance: Alert, No Apparent Distress


Ears: Normal External Exam, Normal Canal, Hearing Grossly Normal, Normal TMs


Ear Exam: Bilateral Ear: Auricle Normal, Canal Normal, TM normal


Nose: Normal Inspection, Normal Mucosa, No Blood


Throat/Mouth: Normal Inspection, Normal Lips, Normal Teeth, Normal Gums, Normal 

Oropharynx, Normal Voice, No Airway Compromise


Neck: Normal Inspection, Supple, Non-Tender, Full Range of Motion


Respiratory/Chest: No Respiratory Distress, Lungs Clear, Normal Breath Sounds, 

No Accessory Muscle Use, Chest Non-Tender


Cardiovascular: Normal Peripheral Pulses, Regular Rate, Rhythm, No Edema, No 

Gallop, No JVD, No Murmur, No Rub


GI/Abdominal: Normal Bowel Sounds, Soft, Non-Tender, No Organomegaly, No 

Distention, No Abnormal Bruit, No Mass


Back Exam: Normal Inspection, Full Range of Motion, NT


Extremities: Other (2 cm by 2 cm raised area of swelling medial left mid calf 

appears superficial tender to palpation at indurated without any fluctuance or 

spontaneous drainage with minimal surrounding cellulitis, negative Homans sign, 

no no lymphedema.  Distal pulses and capillary refill intact exam is more 

consistent with localized indurated skin infection .  Complicated at this time)


Neurological: Alert, CN II-XII Intact


Lymphatic: No Adenopathy





Course





- Vital Signs


Last Recorded V/S: 


 Last Vital Signs











Temp  97.6 F   20 08:06


 


Pulse  71   20 08:06


 


Resp  18   20 08:06


 


BP  174/72 H  20 08:06


 


Pulse Ox  97   20 08:06














- Orders/Labs/Meds


Labs: 


 Laboratory Tests











  20 Range/Units





  08:23 


 


WBC  5.51  (4.0-11.0)  K/uL


 


RBC  4.20 L  (4.30-5.90)  M/uL


 


Hgb  12.7  (12.0-16.0)  g/dL


 


Hct  40.1  (36.0-46.0)  %


 


MCV  95.5  (80.0-98.0)  fL


 


MCH  30.2  (27.0-32.0)  pg


 


MCHC  31.7  (31.0-37.0)  g/dL


 


RDW Std Deviation  47.7  (28.0-62.0)  fl


 


RDW Coeff of Jason  14  (11.0-15.0)  %


 


Plt Count  163  (150-400)  K/uL


 


MPV  9.80  (7.40-12.00)  fL


 


Neut % (Auto)  54.6  (48.0-80.0)  %


 


Lymph % (Auto)  33.9  (16.0-40.0)  %


 


Mono % (Auto)  5.1  (0.0-15.0)  %


 


Eos % (Auto)  6.0  (0.0-7.0)  %


 


Baso % (Auto)  0.4  (0.0-1.5)  %


 


Neut # (Auto)  3.0  (1.4-5.7)  K/uL


 


Lymph # (Auto)  1.9  (0.6-2.4)  K/uL


 


Mono # (Auto)  0.3  (0.0-0.8)  K/uL


 


Eos # (Auto)  0.3  (0.0-0.7)  K/uL


 


Baso # (Auto)  0.0  (0.0-0.1)  K/uL


 


Nucleated RBC %  0.0  /100WBC


 


Nucleated RBCs #  0  K/uL














- Re-Assessments/Exams


Free Text/Narrative Re-Assessment/Exam: 


We will obtain venous Doppler to rule out DVT and CBC to rule out leukocytosis 

plan is to discharge patient on oral and topical antibiotic and follow-up on 

Monday for wound check.  Patient is nontoxic in appearance.  Plan is to 

discharge back to nursing home pending negative studies.


20 08:13








Departure





- Departure


Time of Disposition: 09:36


Disposition: Home, Self-Care 01


Condition: Good


Clinical Impression: 


 Cellulitis and abscess of left leg








- Discharge Information


Instructions:  Cellulitis, Adult


Referrals: 


PCP,Unknown [Primary Care Provider] - 


Forms:  ED Department Discharge





Sepsis Event Note





- Focused Exam


Vital Signs: 


 Vital Signs











  Temp Pulse Resp BP Pulse Ox


 


 20 08:06  97.6 F  71  18  174/72 H  97











Date Exam was Performed: 20


Time Exam was Performed: 09:35

## 2020-09-02 NOTE — EDM.PDOC
ED HPI GENERAL MEDICAL PROBLEM





- General


Stated Complaint: CHEST PAIN


Time Seen by Provider: 18 10:04


Source of Information: Reports: Patient, EMS


History Limitations: Reports: No Limitations





- History of Present Illness


INITIAL COMMENTS - FREE TEXT/NARRATIVE: 





HISTORY AND PHYSICAL:


[]78-year-old female presents per EMS and [facility] with concerns 

over chest pain





History of Present Illness:


[]Patient is been ill for the last week with pains off and on


Patient points to upperabdomen with pain


Patient with atherosclerotic heart disease


History of bypass graft


Collision and stenosis of carotid arteries


History cerebral infarction


COPD


Essential hypertension


Cardiac pacemaker


History of encephalopathy


Hypercholesterolemia





Review of Systems:


As per history of present illness and below otherwise all 


systems reviewed and negative.  





Past medical history:


As per history of present illness and as reviewed below


otherwise noncontributory.





Surgical history:


As per history of present illness and as reviewed below


otherwise noncontributory.





Social history:


No reported history of drug or alcohol abuse.





Family history:


As per history of present illness and as reviewed below


otherwise noncontributory.





Physical exam:


Alert female sitting up complaining of some shortness of breath. Patient does 

have dementia she complains of pain wherever you touch her on her chest or arm 

or leg, abdomen.


HEENT: Atraumatic, normocehpalic, pupils reactive, negative for conjunctival 

pallor or scleral icterus, mucous membranes moist, throat clear, neck supple, 

nontender, trachea midline.  


Lungs: Clear to auscultation, breath sounds equal bilaterally, chest non 

tender.  


Heart: S1S2, regular, negative for clicks, rubs, or JVD.


Abdomen: Soft, nondistended,tender with the patient to the left upper quadrant.

  Negative for masses or hepatossplenmegaly. Negative for costovertebral 

tenderness.


Pelvis: Stable nontender.


Genitourinary: Deferred.


Rectal: Deferred


Extremities: Atraumatic, negative for cords or calf pain.  Patient presents 

with left boots to bilateral feet extending to mid calf.


Neurovascular unremarkable.


Neuro:  Awake, alert, oriented.  Cranial nerves II through XII


unremarkable.  Cerebellum unremarkable.  Motor and sensory unremarkable 

throughout.  Exam nonfocal.  


Patient has slept with her eyes closed between examination


Discussed this case with Dr. Monreal who is in agreement for referral to 

observation place patient on telemetry





Diagnostics:


[]CBC CMP troponin PT/INR H. pylori chest x-ray





Therapeutics:


[]Saline lock


Morphine


Zofran


Pepcid





Impression:


[]Chest pain rule out MI





Plan:


[]





Definitive disposition and diagnosis as appropriate pending


reevaluation and review of above.  





Onset: Gradual


Duration: Week(s): (1)


Location: Reports: Abdomen


Severity: Moderate


Improves with: Reports: None


Worsens with: Reports: Movement (being hungry)


Associated Symptoms: Reports: Cough, Shortness of Breath


Treatments PTA: Reports: Nitroglycerin


  ** Left chest


Pain Score (Numeric/FACES): 8





- Related Data


 Allergies











Allergy/AdvReac Type Severity Reaction Status Date / Time


 


codeine Allergy  Other Verified 18 10:17


 


latex Allergy  Other Verified 18 10:17


 


phosphates Allergy  Other Uncoded 18 10:17











Home Meds: 


 Home Meds





Clopidogrel Bisulfate [Clopidogrel] 75 mg PO DAILY 16 [History]


Metoprolol Succinate 25 mg PO DAILY 16 [History]


Nitroglycerin [Nitrostat] 0.4 mg SL .EVERY 5 MINUTES PRN 16 [History]


atorvaSTATin [Lipitor] 20 mg PO BEDTIME 16 [History]


Aspirin 81 mg PO DAILY 16 [History]


Brimonidine Tartrate [Alphagan P 0.1% Ophth Soln] 1 drop EYERT QAM 10/31/16 [

History]


Multivitamin W-Minerals/Lutein [Vision Plus Lutein Vitamin] 1 tab PO DAILY  [History]


Ferrous Sulfate 325 mg PO TIDMEALS #90 tablet 16 [Rx]


Acetaminophen 500 mg PO Q4H PRN 17 [History]


Carboxymethylcellulose Sodium [Refresh Tears] 1 drop EYEBOTH ASDIRECTED PRN  [History]


Magnesium Hydroxide [Milk of Magnesia] 30 ml PO DAILY PRN 17 [History]


QUEtiapine [SEROquel] 100 mg PO BID 17 [History]


Albuterol Sulfate 2.5 mg INH Q4H PRN 18 [History]


Donepezil HCl [Aricept] 23 mg PO DAILY 18 [History]


Famotidine 40 mg PO BEDTIME 18 [History]


Simethicone 80 mg PO Q12H PRN 18 [History]


Umeclidinium Bromide [Incruse Ellipta*] 1 inh IH DAILY 18 [History]


Bumetanide 1 mg PO DAILY #0 18 [Rx]











Past Medical History





- Past Health History


Medical/Surgical History: Denies Medical/Surgical History


HEENT History: Reports: Cataract


Cardiovascular History: Reports: Arrhythmia, Blood Clots/VTE/DVT, CAD, Heart 

Failure, Pacemaker, PVD


Other Cardiovascular History: CHF, DVT


Respiratory History: Reports: COPD, Other (See Below)


Other Respiratory History: Emphysema


Gastrointestinal History: Reports: GERD


Other Gastrointestinal History: "heartburn"


Genitourinary History: Reports: Chronic Renal Insuffiency


Other Genitourinary History: Pt came in from [place], CNA reported vaginal 

bleeding that started after breakfast today then went on to be heavy with blood 

clots through the afternoon.


OB/GYN History: Reports: Pregnancy


Musculoskeletal History: Reports: Arthritis, Other (See Below)


Other Musculoskeletal History: chronic hip pain left more than the right


Neurological History: Reports: CVA, Migraines


Other Neuro History: Encephalopathy, facial weakness/hemiphlegia/hemiparesis 

following cerebral infarction,


Psychiatric History: Reports: Anxiety, Depression


Other Psychiatric History: Violent Behavioral Outbursts


Endocrine/Metabolic History: Reports: None


Hematologic History: Reports: None


Immunologic History: Reports: None


Oncologic (Cancer) History: Reports: None


Dermatologic History: Reports: Other (See Below)


Other Dermatologic History: frequent yeast to abdominal folds and groins





- Infectious Disease History


Infectious Disease History: Reports: Chicken Pox, Measles, Meningitis





- Past Surgical History


Cardiovascular Surgical History: Reports: Other (See Below)


Female  Surgical History: Reports:  Section


Neurological Surgical History: Reports: Other (See Below)





Social & Family History





- Family History


Family Medical History: Noncontributory


Neurological: Reports: CVA


Psychiatric: Reports: Abuse, Victim of, Anxiety


Endocrine/Metabolic: Reports: Diabetes, type II


Immunologic: Reports: None





- Caffeine Use


Caffeine Use: Reports: Coffee


Caffeine Use Comment: unknown, pt chosing not to talk





- Living Situation & Occupation


Living situation: Reports: with Family


Occupation: Unemployed





ED ROS GENERAL





- Review of Systems


Review Of Systems: ROS reveals no pertinent complaints other than HPI.


Respiratory: Reports: Shortness of Breath





ED EXAM, GENERAL





- Physical Exam


Exam: See Below (See dictation)





EKG INTERPRETATION


EKG Date: 18


Rhythm: NSR


Comparison: No Change





Course





- Vital Signs


Last Recorded V/S: 


 Last Vital Signs











Temp  37.1 C   18 10:19


 


Pulse  79   18 11:47


 


Resp  16   18 11:47


 


BP  118/45 L  18 11:47


 


Pulse Ox  97   18 11:47














- Orders/Labs/Meds


Orders: 


 Active Orders 24 hr











 Category Date Time Status


 


 Patient Status [ADT] Stat ADT  18 12:05 Active


 


 EKG Documentation Completion [RC] STAT Care  18 10:06 Active


 


 Telemetry Monitoring [Cardiac Monitoring] [RC] .AS Care  18 12:06 Active





 DIRECTED   


 


 CULTURE BLOOD [BC] Stat Lab  18 10:02 Received


 


 CULTURE BLOOD [BC] Stat Lab  18 10:34 Results


 


 UA W/MICROSCOPIC [URIN] Stat Lab  18 11:40 Ordered


 


 Sodium Chloride 0.9% [Normal Saline] 1,000 ml Med  18 10:15 Active





 IV ASDIRECTED   


 


 Sodium Chloride 0.9% [Saline Flush] Med  18 10:06 Active





 10 ml FLUSH ASDIRECTED PRN   


 


 Sodium Chloride 0.9% [Saline Flush] Med  18 10:07 Active





 10 ml FLUSH ASDIRECTED PRN   


 


 Sodium Chloride 0.9% [Saline Flush] Med  18 10:06 Active





 2.5 ml FLUSH ASDIRECTED PRN   


 


 Sodium Chloride 0.9% [Saline Flush] Med  18 10:07 Active





 2.5 ml FLUSH ASDIRECTED PRN   


 


 Blood Culture x2 Reflex Set [OM.PC] Stat Oth  18 10:06 Ordered


 


 Saline Lock Insert [OM.PC] Stat Oth  18 10:06 Ordered


 


 Saline Lock Insert [OM.PC] Stat Oth  18 10:06 Ordered








 Medication Orders





Sodium Chloride (Normal Saline)  1,000 mls @ 125 mls/hr IV ASDIRECTED MANE


   Last Admin: 18 10:17  Dose: 125 mls/hr


Sodium Chloride (Saline Flush)  10 ml FLUSH ASDIRECTED PRN


   PRN Reason: Keep Vein Open


   Last Admin: 18 10:16  Dose: 10 ml


Sodium Chloride (Saline Flush)  2.5 ml FLUSH ASDIRECTED PRN


   PRN Reason: Keep Vein Open


   Last Admin: 18 10:16  Dose: 2.5 ml


Sodium Chloride (Saline Flush)  10 ml FLUSH ASDIRECTED PRN


   PRN Reason: Keep Vein Open


   Last Admin: 18 10:16  Dose: 10 ml


Sodium Chloride (Saline Flush)  2.5 ml FLUSH ASDIRECTED PRN


   PRN Reason: Keep Vein Open


   Last Admin: 18 10:16  Dose: 2.5 ml








Labs: 


 Laboratory Tests











  18 Range/Units





  10:02 10:02 10:02 


 


WBC  8.00    (4.0-11.0)  K/uL


 


RBC  3.95 L    (4.30-5.90)  M/uL


 


Hgb  11.5 L    (12.0-16.0)  g/dL


 


Hct  36.5    (36.0-46.0)  %


 


MCV  92.4    (80.0-98.0)  fL


 


MCH  29.1    (27.0-32.0)  pg


 


MCHC  31.5    (31.0-37.0)  g/dL


 


RDW Std Deviation  49.1    (28.0-62.0)  fl


 


RDW Coeff of Jason  15    (11.0-15.0)  %


 


Plt Count  236    (150-400)  K/uL


 


MPV  9.60    (7.40-12.00)  fL


 


Neut % (Auto)  65.0    (48.0-80.0)  %


 


Lymph % (Auto)  24.5    (16.0-40.0)  %


 


Mono % (Auto)  5.3    (0.0-15.0)  %


 


Eos % (Auto)  4.1    (0.0-7.0)  %


 


Baso % (Auto)  1.1    (0.0-1.5)  %


 


Neut # (Auto)  5.2    (1.4-5.7)  K/uL


 


Lymph # (Auto)  2.0    (0.6-2.4)  K/uL


 


Mono # (Auto)  0.4    (0.0-0.8)  K/uL


 


Eos # (Auto)  0.3    (0.0-0.7)  K/uL


 


Baso # (Auto)  0.1    (0.0-0.1)  K/uL


 


Nucleated RBC %  0.0    /100WBC


 


Nucleated RBCs #  0    K/uL


 


INR   1.01   


 


Sodium    139  (136-145)  mmol/L


 


Potassium    3.8  (3.5-5.1)  mmol/L


 


Chloride    102  ()  mmol/L


 


Carbon Dioxide    28.6  (21.0-32.0)  mmol/L


 


BUN    31 H  (7.0-18.0)  mg/dL


 


Creatinine    1.4 H  (0.6-1.0)  mg/dL


 


Est Cr Clr Drug Dosing    TNP  


 


Estimated GFR (MDRD)    36.2  ml/min


 


Glucose    176 H  ()  mg/dL


 


Calcium    8.8  (8.5-10.1)  mg/dL


 


Total Bilirubin    0.2  (0.2-1.0)  mg/dL


 


AST    15  (15-37)  IU/L


 


ALT    20  (14-63)  IU/L


 


Alkaline Phosphatase    119 H  ()  U/L


 


Troponin I    < 0.050  (0.000-0.056)  ng/mL


 


Total Protein    7.2  (6.4-8.2)  g/dL


 


Albumin    3.1 L  (3.4-5.0)  g/dL


 


Globulin    4.1 H  (2.0-3.5)  g/dL


 


Albumin/Globulin Ratio    0.8 L  (1.3-2.8)  


 


TSH 3rd Generation    1.78  (0.36-3.74)  uIU/mL


 


Urine Color     


 


Urine Appearance     


 


Urine pH     (5.0-8.0)  


 


Ur Specific Gravity     (1.001-1.035)  


 


Urine Protein     (NEGATIVE)  mg/dL


 


Urine Glucose (UA)     (NEGATIVE)  mg/dL


 


Urine Ketones     (NEGATIVE)  mg/dL


 


Urine Occult Blood     (NEGATIVE)  


 


Urine Nitrite     (NEGATIVE)  


 


Urine Bilirubin     (NEGATIVE)  


 


Urine Urobilinogen     (<2.0)  EU/dL


 


Ur Leukocyte Esterase     (NEGATIVE)  


 


Urine RBC     (0-2/HPF)  


 


Urine WBC     (0-5/HPF)  


 


Ur Epithelial Cells     (NONE-FEW)  


 


Urine Bacteria     (NEGATIVE)  


 


H. pylori IgG Antibody     (NEG)  














  18 Range/Units





  10:02 11:40 


 


WBC    (4.0-11.0)  K/uL


 


RBC    (4.30-5.90)  M/uL


 


Hgb    (12.0-16.0)  g/dL


 


Hct    (36.0-46.0)  %


 


MCV    (80.0-98.0)  fL


 


MCH    (27.0-32.0)  pg


 


MCHC    (31.0-37.0)  g/dL


 


RDW Std Deviation    (28.0-62.0)  fl


 


RDW Coeff of Jason    (11.0-15.0)  %


 


Plt Count    (150-400)  K/uL


 


MPV    (7.40-12.00)  fL


 


Neut % (Auto)    (48.0-80.0)  %


 


Lymph % (Auto)    (16.0-40.0)  %


 


Mono % (Auto)    (0.0-15.0)  %


 


Eos % (Auto)    (0.0-7.0)  %


 


Baso % (Auto)    (0.0-1.5)  %


 


Neut # (Auto)    (1.4-5.7)  K/uL


 


Lymph # (Auto)    (0.6-2.4)  K/uL


 


Mono # (Auto)    (0.0-0.8)  K/uL


 


Eos # (Auto)    (0.0-0.7)  K/uL


 


Baso # (Auto)    (0.0-0.1)  K/uL


 


Nucleated RBC %    /100WBC


 


Nucleated RBCs #    K/uL


 


INR    


 


Sodium    (136-145)  mmol/L


 


Potassium    (3.5-5.1)  mmol/L


 


Chloride    ()  mmol/L


 


Carbon Dioxide    (21.0-32.0)  mmol/L


 


BUN    (7.0-18.0)  mg/dL


 


Creatinine    (0.6-1.0)  mg/dL


 


Est Cr Clr Drug Dosing    


 


Estimated GFR (MDRD)    ml/min


 


Glucose    ()  mg/dL


 


Calcium    (8.5-10.1)  mg/dL


 


Total Bilirubin    (0.2-1.0)  mg/dL


 


AST    (15-37)  IU/L


 


ALT    (14-63)  IU/L


 


Alkaline Phosphatase    ()  U/L


 


Troponin I    (0.000-0.056)  ng/mL


 


Total Protein    (6.4-8.2)  g/dL


 


Albumin    (3.4-5.0)  g/dL


 


Globulin    (2.0-3.5)  g/dL


 


Albumin/Globulin Ratio    (1.3-2.8)  


 


TSH 3rd Generation    (0.36-3.74)  uIU/mL


 


Urine Color   YELLOW  


 


Urine Appearance   CLEAR  


 


Urine pH   6.0  (5.0-8.0)  


 


Ur Specific Gravity   1.010  (1.001-1.035)  


 


Urine Protein   NEGATIVE  (NEGATIVE)  mg/dL


 


Urine Glucose (UA)   NEGATIVE  (NEGATIVE)  mg/dL


 


Urine Ketones   NEGATIVE  (NEGATIVE)  mg/dL


 


Urine Occult Blood   MODERATE  (NEGATIVE)  


 


Urine Nitrite   NEGATIVE  (NEGATIVE)  


 


Urine Bilirubin   NEGATIVE  (NEGATIVE)  


 


Urine Urobilinogen   0.2  (<2.0)  EU/dL


 


Ur Leukocyte Esterase   NEGATIVE  (NEGATIVE)  


 


Urine RBC   0-2  (0-2/HPF)  


 


Urine WBC   1-3  (0-5/HPF)  


 


Ur Epithelial Cells   FEW  (NONE-FEW)  


 


Urine Bacteria   FEW  (NEGATIVE)  


 


H. pylori IgG Antibody  NEGATIVE   (NEG)  











Meds: 


Medications











Generic Name Dose Route Start Last Admin





  Trade Name Freq  PRN Reason Stop Dose Admin


 


Sodium Chloride  1,000 mls @ 125 mls/hr  18 10:15  18 10:17





  Normal Saline  IV   125 mls/hr





  ASDIRECTED MANE   Administration





     





     





     





     


 


Sodium Chloride  10 ml  18 10:06  18 10:16





  Saline Flush  FLUSH   10 ml





  ASDIRECTED PRN   Administration





  Keep Vein Open   





     





     





     


 


Sodium Chloride  2.5 ml  18 10:06  18 10:16





  Saline Flush  FLUSH   2.5 ml





  ASDIRECTED PRN   Administration





  Keep Vein Open   





     





     





     


 


Sodium Chloride  10 ml  18 10:07  18 10:16





  Saline Flush  FLUSH   10 ml





  ASDIRECTED PRN   Administration





  Keep Vein Open   





     





     





     


 


Sodium Chloride  2.5 ml  18 10:07  18 10:16





  Saline Flush  FLUSH   2.5 ml





  ASDIRECTED PRN   Administration





  Keep Vein Open   





     





     





     














Discontinued Medications














Generic Name Dose Route Start Last Admin





  Trade Name Freq  PRN Reason Stop Dose Admin


 


Famotidine  20 mg  18 10:11  18 10:21





  Pepcid  IVPUSH  18 10:12  20 mg





  ONETIME ONE   Administration





     





     





     





     


 


Morphine Sulfate  2 mg  18 10:06  18 10:16





  Morphine  IVPUSH  18 10:07  2 mg





  ONETIME ONE   Administration





     





     





     





     


 


Nitroglycerin  0.5 gm  18 10:06  18 10:16





  Nitro-Bid 2%  TOP  18 10:07  0.5 gm





  ONETIME ONE   Administration





     





     





     





     


 


Ondansetron HCl  4 mg  18 10:06  18 10:16





  Zofran  IVPUSH  18 10:07  4 mg





  ONETIME ONE   Administration





     





     





     





     














Departure





- Departure


Time of Disposition: 12:08


Disposition: Refer to Observation


Condition: Good


Clinical Impression: 


 Chest pain








- My Orders


Last 24 Hours: 


My Active Orders





18 10:02


CULTURE BLOOD [BC] Stat 





18 10:06


EKG Documentation Completion [RC] STAT 


Blood Culture x2 Reflex Set [OM.PC] Stat 


Saline Lock Insert [OM.PC] Stat 





18 10:07


Sodium Chloride 0.9% [Saline Flush]   10 ml FLUSH ASDIRECTED PRN 


Sodium Chloride 0.9% [Saline Flush]   2.5 ml FLUSH ASDIRECTED PRN 





18 10:34


CULTURE BLOOD [BC] Stat 





18 11:40


UA W/MICROSCOPIC [URIN] Stat 





18 12:05


Patient Status [ADT] Stat 





18 12:06


Telemetry Monitoring [Cardiac Monitoring] [RC] .AS DIRECTED 














- Assessment/Plan


Last 24 Hours: 


My Active Orders





18 10:02


CULTURE BLOOD [BC] Stat 





18 10:06


EKG Documentation Completion [RC] STAT 


Blood Culture x2 Reflex Set [OM.PC] Stat 


Saline Lock Insert [OM.PC] Stat 





18 10:07


Sodium Chloride 0.9% [Saline Flush]   10 ml FLUSH ASDIRECTED PRN 


Sodium Chloride 0.9% [Saline Flush]   2.5 ml FLUSH ASDIRECTED PRN 





18 10:34


CULTURE BLOOD [BC] Stat 





18 11:40


UA W/MICROSCOPIC [URIN] Stat 





18 12:05


Patient Status [ADT] Stat 





18 12:06


Telemetry Monitoring [Cardiac Monitoring] [RC] .AS DIRECTED dentures

## 2021-04-07 NOTE — EDM.PDOC
ED HPI GENERAL MEDICAL PROBLEM





- General


Chief Complaint: Laceration


Stated Complaint: RT LEG INJURY


Time Seen by Provider: 21 15:54


Source of Information: Reports: Other (caretaker)


History Limitations: Reports: No Limitations





- History of Present Illness


INITIAL COMMENTS - FREE TEXT/NARRATIVE: 





80-year-old female presents for laceration to right lower extremity.  History is

from caretaker.  Patient was in a nursing home.  When they were assisting her 

out of her wheelchair she scraped the side of her leg and on the wheelchair.  

She had bleeding afterwards and was noted to have a large wound.  Tetanus status

is unknown.  No other injuries or complaints.


  ** "all over"


Pain Score (Numeric/FACES): 0





- Related Data


                                    Allergies











Allergy/AdvReac Type Severity Reaction Status Date / Time


 


cephalexin [From Keflex] Allergy  Other Verified 21 16:07


 


codeine Allergy  Other Verified 21 16:07


 


latex Allergy  Other Verified 21 16:07


 


phosphates Allergy  Other Uncoded 21 16:07











Home Meds: 


                                    Home Meds





Clopidogrel Bisulfate [Clopidogrel] 75 mg PO DAILY 16 [History]


Metoprolol Succinate 25 mg PO DAILY 16 [History]


Nitroglycerin [Nitrostat] 0.4 mg SL .EVERY 5 MINUTES PRN 16 [History]


atorvaSTATin [Lipitor] 20 mg PO BEDTIME 16 [History]


Aspirin 81 mg PO DAILY 16 [History]


Multivit with Minerals/Lutein [Vision Plus Lutein Vitamin] 1 tab PO DAILY 

16 [History]


Ferrous Sulfate 325 mg PO TIDMEALS #90 tablet 16 [Rx]


Acetaminophen 500 mg PO Q4H PRN 17 [History]


Magnesium Hydroxide [Milk of Magnesia] 30 ml PO DAILY PRN 17 [History]


Albuterol Sulfate 2.5 mg INH Q4H PRN 18 [History]


Bumetanide 1 mg PO DAILY #0 18 [Rx]


Mag Hydrox/Aluminum Hyd/Simeth [Mylanta Maximum Strength Liq] 30 ml PO DAILY PRN

20 [History]


Omeprazole 20 mg PO BEDTIME 20 [History]


traMADol [Ultram] 50 mg PO Q6H PRN 20 [History]


Brimonidine Tartrate [Alphagan P 0.1% Ophth Soln] 1 drop EYERT QAM 20 

[History]


Carbamide Peroxide [Debrox 6.5% Otic Soln] 3 drop EARBOTH DAILY PRN 20 

[History]


Menthol/Zinc Oxide [Gold Bond Medicated Body Powd] 0 gm TOP ASDIRECTED PRN 

20 [History]


Benzocaine [Oral Anesthetic] 1 applic DENT Q4H PRN 21 [History]


Clotrimazole [Clotrimazole 1%] 1 applic TOP BID 21 [History]


PARoxetine [Paxil] 40 mg PO DAILY 21 [History]











Past Medical History





- Past Health History


Medical/Surgical History: Denies Medical/Surgical History


HEENT History: Reports: Cataract


Cardiovascular History: Reports: Arrhythmia, Blood Clots/VTE/DVT, CAD, Heart 

Failure, Pacemaker, PVD


Other Cardiovascular History: DVT


Respiratory History: Reports: COPD, Other (See Below)


Other Respiratory History: Emphysema


Gastrointestinal History: Reports: GERD


Other Gastrointestinal History: "heartburn"


Genitourinary History: Reports: Chronic Renal Insuffiency


Other Genitourinary History: Pt came in from [place], CNA reported vaginal 

bleeding that started after breakfast today then went on to be heavy with blood 

clots through the afternoon.


OB/GYN History: Reports: Pregnancy


Musculoskeletal History: Reports: Arthritis, Other (See Below)


Other Musculoskeletal History: chronic hip pain left more than the right


Neurological History: Reports: CVA, Migraines


Other Neuro History: Encephalopathy, facial weakness/hemiphlegia/hemiparesis 

following cerebral infarction,


Psychiatric History: Reports: Alzheimers Disease, Anxiety, Depression


Other Psychiatric History: Violent Behavioral Outbursts


Endocrine/Metabolic History: Reports: None


Hematologic History: Reports: None


Immunologic History: Reports: None


Oncologic (Cancer) History: Reports: None


Dermatologic History: Reports: Other (See Below)


Other Dermatologic History: frequent yeast to abdominal folds and groins





- Infectious Disease History


Infectious Disease History: Reports: Chicken Pox, Measles, Meningitis





- Past Surgical History


Head Surgeries/Procedures: Reports: None


HEENT Surgical History: Reports: None


Cardiovascular Surgical History: Reports: Other (See Below)


Other Cardiovascular Surgeries/Procedures: Pacemaker insertion


Respiratory Surgical History: Reports: None


GI Surgical History: Reports: None


Female  Surgical History: Reports:  Section


Endocrine Surgical History: Reports: None


Neurological Surgical History: Reports: Other (See Below)


Other Neurological Surgeries/Procedures: "back surgery"


Musculoskeletal Surgical History: Reports: None


Other Musculoskeletal Surgeries/Procedures:: neck surgery and back surgery


Dermatological Surgical History: Reports: None





Social & Family History





- Family History


Family Medical History: No Pertinent Family History


Neurological: Reports: CVA


Psychiatric: Reports: Abuse, Victim of, Anxiety


Endocrine/Metabolic: Reports: Diabetes, type II


Immunologic: Reports: None





- Tobacco Use


Tobacco Use Status *Q: Never Tobacco User





- Caffeine Use


Caffeine Use: Reports: None


Caffeine Use Comment: unknown, pt chosing not to talk





- Recreational Drug Use


Recreational Drug Use: No





- Living Situation & Occupation


Living situation: Reports: with Family


Occupation: Unemployed





ED ROS GENERAL





- Review of Systems


Review Of Systems: Comprehensive ROS is negative, except as noted in HPI.





ED EXAM, SKIN/RASH


Exam: See Below


Exam Limited By: No Limitations


General Appearance: Alert, WD/WN, No Apparent Distress


Throat/Mouth: Normal Voice, No Airway Compromise


Head: Atraumatic, Normocephalic


Neck: Normal Inspection


Respiratory/Chest: No Respiratory Distress, No Accessory Muscle Use


Cardiovascular: Normal Peripheral Pulses


Extremities: Normal Inspection


Neurological: Alert


Psychiatric: Normal Affect, Normal Mood


Skin: Warm, Dry, Intact, Normal Color, Other (3 cm stellate laceration to right 

shin)





ED SKIN PROCEDURES





- Laceration/Wound Repair


  ** Right Leg


Appearance: Superficial


Distal NVT: Neuro & Vascular Intact


Local Anesthesia - Lidocaine (Xylocaine): 1% with EPI


Local Anesthetic Volume: 5cc


Skin Prep: Isopropyl Alcohol (Alcohol)


Saline Irrigation (cc's): 30


Closed with: Sutures


Lac/Wound length In cm: 3


Suture Size: 4-0


# of Sutures: 3


Sterile Dressing Applied: Nurse


Tetanus Status Addressed: Yes


Complications: No





Course





- Vital Signs


Last Recorded V/S: 


                                Last Vital Signs











Temp  97.6 F   21 16:03


 


Pulse  69   21 16:03


 


Resp  20   21 16:03


 


BP  135/60   21 16:03


 


Pulse Ox  99   21 16:03














- Orders/Labs/Meds


Orders: 


                               Active Orders 24 hr











 Category Date Time Status


 


 Vaccines to be Administered [RC] PER UNIT ROUTINE Care  21 16:50 Active











Meds: 


Medications














Discontinued Medications














Generic Name Dose Route Start Last Admin





  Trade Name Helga  PRN Reason Stop Dose Admin


 


Diphtheria/Tetanus/Acell Pertussis  0.5 ml  21 16:50 





  Diphtheria,Pertussis(Acell),Tetanus Vaccine 0.5 Ml Syringe  IM  21 16:51

 





  .ONCE ONE  


 


Lidocaine/Epinephrine  20 ml  21 16:51 





  Lidocaine 1% With Epinephrine 1:100,000 10 Ml Mdv  INJECT  21 16:52 





  ONETIME ONE  














Departure





- Departure


Time of Disposition: 16:44


Disposition: Home, Self-Care 01


Condition: Good


Clinical Impression: 


 Laceration








- Discharge Information


Instructions:  Laceration Care, Adult


Referrals: 


PCP,None [Primary Care Provider] - 


Forms:  ED Department Discharge


Additional Instructions: 


Please have the sutures removed in 7 to 10 days.  Keep the area clean with soap 

and water.





The following information is given to patients seen in the emergency department 

who are being discharged to home. This information is to outline your options 

for follow-up care. We provide all patients seen in our emergency department 

with a follow-up referral.





The need for follow-up, as well as the timing and circumstances, are variable 

depending upon the specifics of your emergency department visit.





If you don't have a primary care physician on staff, we will provide you with a 

referral. We always advise you to contact your personal physician following an 

emergency department visit to inform them of the circumstance of the visit and 

for follow-up with them and/or the need for any referrals to a consulting 

specialist.





The emergency department will also refer you to a specialist when appropriate. 

This referral assures that you have the opportunity for follow-up care with a 

specialist. All of these measure are taken in an effort to provide you with 

optimal care, which includes your follow-up.





Under all circumstances we always encourage you to contact your private 

physician who remains a resource for coordinating your care. When calling for 

follow-up care, please make the office aware that this follow-up is from your 

recent emergency room visit. If for any reason you are refused follow-up, please

contact the [facility]

at [phone] and asked to speak to the emergency department charge nurse.





Please follow up with your primary care physician. If you do not have a primary 

care physician, see below:


[facility]


[address], [place] [id number] [phone]





[facility]


[address] [place] [address] [phone]








[facility]


[address] [place] [address]


Phone: [phone]


Fax: [phone]








Sepsis Event Note (ED)





- Evaluation


Sepsis Screening Result: No Definite Risk





- Focused Exam


Vital Signs: 


                                   Vital Signs











  Temp Pulse Resp BP Pulse Ox


 


 21 16:03  97.6 F  69  20  135/60  99














- My Orders


Last 24 Hours: 


My Active Orders





21 16:50


Vaccines to be Administered [RC] PER UNIT ROUTINE 














- Assessment/Plan


Last 24 Hours: 


My Active Orders





21 16:50


Vaccines to be Administered [RC] PER UNIT ROUTINE

## 2021-08-17 NOTE — EDM.PDOC
ED HPI GENERAL MEDICAL PROBLEM





- General


Chief Complaint: Neuro Symptoms/Deficits


Time Seen by Provider: 21 14:05





- History of Present Illness


INITIAL COMMENTS - FREE TEXT/NARRATIVE: 





CHIEF COMPLAINT(S): Possible stroke





HISTORY OF PRESENT ILLNESS: This is a 81-year-old woman bedbound with a past 

medical history of prior CVA with unknown deficits, CHF, aortic stenosis, CKD, 

GERD, Alzheimer's disease, hypertension who presents to the emergency department

as a medical resuscitation via EMS with a chief complaint of possible stroke. 

Per EMS: The patient's last known well was approximately 9 AM and at noon they 

noticed that the patient had some altered speech. Per EMS: The patient normally 

knows a few set of words and apparently at this time she did not know those 

words. They denied any head injury or loss of consciousness and noticed that her

pupils were different size. Otherwise history is limited.





History is severely limited from patient as patient is only able to say her name

otherwise does not provide any meaningful history.





REVIEW OF SYSTEMS: 


Unable to obtain secondary patient clinical condition








PAST MEDICAL HISTORY: As per history of present illness and as reviewed below 

otherwise noncontributory.





SURGICAL HISTORY: As per history of present illness and as reviewed below 

otherwise noncontributory.





SOCIAL HISTORY: As per history of present illness and as reviewed below 

otherwise noncontributory.





FAMILY HISTORY: As per history of present illness and as reviewed below 

otherwise noncontributory.





EXAMINATION OF ORGAN SYSTEMS/BODY AREAS: 





VITALS: Blood pressure was 189/66, heart rate 77, respiratory rate 17 with an o

xygen saturation 96% on room air..


GENERAL: The patient is well-nourished and in no acute distress.


HEAD: Normocephalic, atraumatic. 


EYES: Unable to assess extraocular movements secondary to patient cooperation. 

Pupil on the right side was 3 mm and pupil on left side was 1 mm both were 

reactive. Nystagmus is hard to assess given patient cooperation. Unable to 

assess visual field defects.  


ENT. External ears WNL. Nares patent. Oropharynx is clear with no erythema or 

exudate. No uvular or tongue swelling.


NECK: Supple, no masses. Trachea is midline. 


LUNGS: No tachypnea or intercostal retractions. Clear to auscultation 

bilaterally, no wheezing, no rales, no stridor, no rhonchi. 


CARDIOVASCULAR: Regular rate and rhythm with S1-S2. No murmur, rubs or gallops. 

No edema. No JVD.


ABDOMEN: Soft, non-distended, non-tender. Bowel sounds present in all 4 

quadrants. No rebound tenderness, guarding, or peritoneal signs.


MUSCULOSKELETAL: No deformity..  


NEUROLOGICAL: Alert, oriented x1. Patient has an NIH scale of 14. Patient is not

able to state the month or the age. She does obey commands. There is no obvious 

lateral gazes. Visual fields unable to be evaluated. The patient has a left-

sided facial droop. The left arm is nonmobile but the patient does feel pain in 

this arm. She does not withdraw to pain. There is no right arm weakness. There 

is bilateral lower extremity weakness but the patient does not withdraw to pain.

She cannot lift these legs above gravity. Unable to assess finger-to-nose, 

heel-to-shin. Unable to assess sensation. The patient does withdraw to pain in 

bilateral lower extremities and right upper extremity. The patient speech is 

mumbled. Unable to assess neglect.. 


SKIN: No rashes, or pallor. No signs of injury.





MEDICAL DECISION MAKING AND COURSE IN THE ED WITH INTERPRETATION/REVIEW OF 

DIAGNOSTIC STUDIES:  This is a 81-year-old woman bedbound with a past medical 

history of prior CVA with unknown deficits, CHF, aortic stenosis, CKD, GERD, 

Alzheimer's disease, hypertension who presents to the emergency department as a 

medical resuscitation via EMS with a chief complaint of possible strok.  

Immediately upon entering the resuscitation room the patient was disrobed, 

placed on continuous cardiac monitoring, and IV access was attempted by nursing.

Patient was able to say their name thus displaying a patent airway, breath 

sounds are equal bilaterally and the patient did have palpable pulses in all 4 

extremities. At this time it is uncertain as to what is the patient's baseline 

given that she has had prior CVAs we did obtain a point-of-care glucose which 

was normal. Cardiac monitoring at this time did reveal sinus rhythm and pulse 

oximetry with good waveform was appropriate on room air. The patient does have 

an NIH of 14 however it is uncertain as to what is her baseline neurological 

deficits. Given this we will undergo a stroke work-up. Will obtain CT head 

without contrast and CTA of the head and neck. Nursing staff did have given 

difficulty with obtaining IV access therefore I placed a an 18-gauge in the 

patient's left antecubital fossa via ultrasound guidance. Patient was taken to 

CT.








Reviewing the patient's paperwork there is no information on the patient's 

baseline. We did contact the patient's nursing home and they stated that the 

last known well was 11 a.m. therefore at this time it is uncertain as to what 

her actual known well is. They do not know her baseline deficits other than she 

does have a few words that she speaks. The patient did come with paperwork that 

the patient is full code.





The radiological images were viewed by myself along with reading the report from

the radiologist.


CT head without contrast does not reveal any acute intracranial abnormality via 

preliminary report.


CTA of the head and neck via preliminary report reveals occlusion of the left 

ICA near its origin. The left MCA is supplied by the anterior and posterior 

communicating arteries. Moderate to severe right ICA stenosis due to 

atherosclerotic calcified and noncalcified plaque. There is occlusion of the 

right vertebral artery near its origin. Dominant left vertebral artery.





After imaging I did contact radiology regarding the report. At this time it is 

uncertain as to whether these occlusions are acute or chronic. On reevaluation 

the patient's NIH score continue to remain the same. While the patient was 

brought back from the CT she did stool herself and it appeared that the patient 

does have melena and the stool was guaiac positive. Given the possible GI bleed 

and the uncertain last known well the patient is not a candidate for TNKase.





I contacted the patient's son Dejan at [phone]. I discussed the findings 

thus far and he reiterated that the patient is full code and I discussed that I 

would need to speak to a neurologist based on the imaging about further 

intervention. He states that I am "wasting my time." And hung up.





Therefore, given that the patient is full code and given the uncertainty if 

these are new or chronic occlusions I contacted [facility] in [place] and 

spoke with Dr. Abdul. Given that the patient is full code he recommended transfer

where they will obtain MRIs to evaluate for acute or chronic occlusions to 

assess need for possible intervention. At this time he stated that given that 

the patient is already on Plavix there is no need to add any additional 

medication at this time and just to send the patient to the emergency department

at [facility] in [place]. I then spoke with Dr. Medina who accepted the 

patient for transfer.





I did contact the patient's son and updated him on the plan. He was amenable 

this plan and asked at this time if there was any medication that we could give 

her if this is an acute stroke to reverse it. I did discuss with him that the 

only medication at this time is TNKase which is contraindicated given that she 

is having a GI bleed in addition she is outside the TPA window given the unknown

last known well. He did express understanding.





EKG was obtained which did not reveal any acute signs of ischemia.





Laboratory: CBC is unremarkable. Coags are within normal limits. CMP reveals 

elevated BUN at 31 and a creatinine of 1.4, hyperglycemia at 128, elevated 

alkaline phosphatase at 249. Troponin is negative.





Helicopter was contacted and they will be here within 15 minutes





DISPOSITION: Patient was transferred to [facility] in [place] in stable cond

ition





CONDITION: Serious





PROCEDURES: Cardiac monitoring interpretation, pulse oximetry interpretation, 

ultrasound-guided peripheral IV insertion





FINAL IMPRESSION(S)/DIAGNOSES: 





1. Acute CVA





Critical Care Procedure Note





Authorized and performed by: Corey Woodruff M.D. 


Critical Care Time: 67 minutes


Due to a high probability of clinically significant, life threatening 

deterioration, the patient required my highest level of preparedness to 

intervene emergently and I personally spent this critical care time directly and

personally managing the patient. This critical care time included obtaining a 

history, examining the patient, pulse oximetry; ordering and review of studies; 

arranging urgent treatment with development of a management plan; evaluation of 

a patients reponse to treatment; frequent assessment; and discussions with other

providers. This critical care time was performed to assess and manage the high 

probability of imminent, life threatening deterioration that could result in 

multiorgan failure. It was exclusive of separate billable procedures and 

treating other patients. 





Please see MDM section and rest of the note for further information on patient 

assessment and treatment.





Please see MDM section and rest of the note for further information on patient 

assessment and treatment.








- Related Data


                                    Allergies











Allergy/AdvReac Type Severity Reaction Status Date / Time


 


cephalexin [From Keflex] Allergy  Other Verified 21 16:07


 


codeine Allergy  Other Verified 21 16:07


 


latex Allergy  Other Verified 21 16:07


 


phosphates Allergy  Other Uncoded 21 16:07











Home Meds: 


                                    Home Meds





Clopidogrel Bisulfate [Clopidogrel] 75 mg PO DAILY 16 [History]


Metoprolol Succinate 25 mg PO DAILY 16 [History]


Nitroglycerin [Nitrostat] 0.4 mg SL .EVERY 5 MINUTES PRN 16 [History]


atorvaSTATin [Lipitor] 20 mg PO BEDTIME 16 [History]


Aspirin 81 mg PO DAILY 16 [History]


Multivit with Minerals/Lutein [Vision Plus Lutein Vitamin] 1 tab PO DAILY 

16 [History]


Ferrous Sulfate 325 mg PO TIDMEALS #90 tablet 16 [Rx]


Acetaminophen 500 mg PO Q4H PRN 17 [History]


Magnesium Hydroxide [Milk of Magnesia] 30 ml PO DAILY PRN 17 [History]


Albuterol Sulfate 2.5 mg INH Q4H PRN 18 [History]


Bumetanide 1 mg PO DAILY #0 18 [Rx]


Mag Hydrox/Aluminum Hyd/Simeth [Mylanta Maximum Strength Liq] 30 ml PO DAILY PRN

20 [History]


Omeprazole 20 mg PO BEDTIME 20 [History]


traMADol [Ultram] 50 mg PO Q6H PRN 20 [History]


Brimonidine Tartrate [Alphagan P 0.1% Ophth Soln] 1 drop EYERT QAM 20 

[History]


Carbamide Peroxide [Debrox 6.5% Otic Soln] 3 drop EARBOTH DAILY PRN 20 

[History]


Menthol/Zinc Oxide [[place] Bond Medicated Body Powd] 0 gm TOP ASDIRECTED PRN 

20 [History]


Benzocaine [Oral Anesthetic] 1 applic DENT Q4H PRN 21 [History]


Clotrimazole [Clotrimazole 1%] 1 applic TOP BID 21 [History]


PARoxetine [Paxil] 40 mg PO DAILY 21 [History]











Past Medical History





- Past Health History


Medical/Surgical History: Denies Medical/Surgical History


HEENT History: Reports: Cataract


Cardiovascular History: Reports: Arrhythmia, Blood Clots/VTE/DVT, CAD, Heart 

Failure, Pacemaker, PVD


Other Cardiovascular History: DVT


Respiratory History: Reports: COPD, Other (See Below)


Other Respiratory History: Emphysema


Gastrointestinal History: Reports: GERD


Other Gastrointestinal History: "heartburn"


Genitourinary History: Reports: Chronic Renal Insuffiency


Other Genitourinary History: Pt came in from [place], CNA reported vaginal 

bleeding that started after breakfast today then went on to be heavy with blood 

clots through the afternoon.


OB/GYN History: Reports: Pregnancy


Musculoskeletal History: Reports: Arthritis, Other (See Below)


Other Musculoskeletal History: chronic hip pain left more than the right


Neurological History: Reports: CVA, Migraines


Other Neuro History: Encephalopathy, facial weakness/hemiphlegia/hemiparesis 

following cerebral infarction,


Psychiatric History: Reports: Alzheimers Disease, Anxiety, Depression


Other Psychiatric History: Violent Behavioral Outbursts


Endocrine/Metabolic History: Reports: None


Hematologic History: Reports: None


Immunologic History: Reports: None


Oncologic (Cancer) History: Reports: None


Dermatologic History: Reports: Other (See Below)


Other Dermatologic History: frequent yeast to abdominal folds and groins





- Infectious Disease History


Infectious Disease History: Reports: Chicken Pox, Measles, Meningitis





- Past Surgical History


Head Surgeries/Procedures: Reports: None


HEENT Surgical History: Reports: None


Cardiovascular Surgical History: Reports: Other (See Below)


Other Cardiovascular Surgeries/Procedures: Pacemaker insertion


Respiratory Surgical History: Reports: None


GI Surgical History: Reports: None


Female  Surgical History: Reports:  Section


Endocrine Surgical History: Reports: None


Neurological Surgical History: Reports: Other (See Below)


Other Neurological Surgeries/Procedures: "back surgery"


Musculoskeletal Surgical History: Reports: None


Other Musculoskeletal Surgeries/Procedures:: neck surgery and back surgery


Oncologic Surgical History: Reports: None


Dermatological Surgical History: Reports: None





Social & Family History





- Family History


Family Medical History: No Pertinent Family History


Neurological: Reports: CVA


Psychiatric: Reports: Abuse, Victim of, Anxiety


Endocrine/Metabolic: Reports: Diabetes, type II


Immunologic: Reports: None





- Tobacco Use


Tobacco Use Status *Q: Unknown Ever Used Tobacco





- Caffeine Use


Caffeine Use: Reports: None


Caffeine Use Comment: unknown, pt chosing not to talk





- Living Situation & Occupation


Living situation: Reports: with Family


Occupation: Unemployed





ED ROS GENERAL





- Review of Systems


Review Of Systems: See Below





ED EXAM, GENERAL





- Physical Exam


Exam: See Below





Course





- Vital Signs


Last Recorded V/S: 





                                Last Vital Signs











Temp  35.7 C L  21 16:10


 


Pulse  77   21 16:10


 


Resp  22 H  21 16:10


 


BP  162/100 H  21 16:10


 


Pulse Ox  99   21 16:10














- Orders/Labs/Meds


Orders: 





                               Active Orders 24 hr











 Category Date Time Status


 


 Peripheral IV Insertion Adult [OM.PC] Stat Ot  21 13:48 Ordered


 


 Peripheral IV Insertion Adult [OM.PC] Stat Ot  21 13:48 Ordered


 


 Resuscitation Status Stat Resus Stat  21 13:48 Ordered











Labs: 





                                Laboratory Tests











  21 Range/Units





  13:54 13:54 13:54 


 


WBC  6.59    (4.0-11.0)  K/uL


 


RBC  4.46    (4.30-5.90)  M/uL


 


Hgb  12.7    (12.0-16.0)  g/dL


 


Hct  38.7    (36.0-46.0)  %


 


MCV  86.8    (80.0-98.0)  fL


 


MCH  28.5    (27.0-32.0)  pg


 


MCHC  32.8    (31.0-37.0)  g/dL


 


RDW Std Deviation  44.4    (28.0-62.0)  fl


 


RDW Coeff of Jason  14    (11.0-15.0)  %


 


Plt Count  251    (150-400)  K/uL


 


MPV  9.90    (7.40-12.00)  fL


 


Neut % (Auto)  58.3    (48.0-80.0)  %


 


Lymph % (Auto)  27.3    (16.0-40.0)  %


 


Mono % (Auto)  8.0    (0.0-15.0)  %


 


Eos % (Auto)  5.3    (0.0-7.0)  %


 


Baso % (Auto)  1.1    (0.0-1.5)  %


 


Neut # (Auto)  3.8    (1.4-5.7)  K/uL


 


Lymph # (Auto)  1.8    (0.6-2.4)  K/uL


 


Mono # (Auto)  0.5    (0.0-0.8)  K/uL


 


Eos # (Auto)  0.4    (0.0-0.7)  K/uL


 


Baso # (Auto)  0.1    (0.0-0.1)  K/uL


 


Nucleated RBC %  0.0    /100WBC


 


Nucleated RBCs #  0    K/uL


 


INR   1.02   


 


APTT   24.5   (18.6-31.3)  SEC


 


Sodium    139  (136-145)  mmol/L


 


Potassium    3.8  (3.5-5.1)  mmol/L


 


Chloride    100  ()  mmol/L


 


Carbon Dioxide    30.8  (21.0-32.0)  mmol/L


 


BUN    31 H  (7.0-18.0)  mg/dL


 


Creatinine    1.4 H  (0.6-1.0)  mg/dL


 


Est Cr Clr Drug Dosing    21.87  mL/min


 


Estimated GFR (MDRD)    35.9  ml/min


 


Glucose    128 H  ()  mg/dL


 


Calcium    9.3  (8.5-10.1)  mg/dL


 


Magnesium     (1.8-2.4)  mg/dL


 


Total Bilirubin    0.3  (0.2-1.0)  mg/dL


 


AST    35  (15-37)  IU/L


 


ALT    34  (14-63)  IU/L


 


Alkaline Phosphatase    249 H  ()  U/L


 


Troponin I    < 0.050  (0.000-0.056)  ng/mL


 


Total Protein    7.3  (6.4-8.2)  g/dL


 


Albumin    3.0 L  (3.4-5.0)  g/dL


 


Globulin    4.3 H  (2.6-4.0)  g/dL


 


Albumin/Globulin Ratio    0.7 L  (0.9-1.6)  














  21 Range/Units





  13:54 


 


WBC   (4.0-11.0)  K/uL


 


RBC   (4.30-5.90)  M/uL


 


Hgb   (12.0-16.0)  g/dL


 


Hct   (36.0-46.0)  %


 


MCV   (80.0-98.0)  fL


 


MCH   (27.0-32.0)  pg


 


MCHC   (31.0-37.0)  g/dL


 


RDW Std Deviation   (28.0-62.0)  fl


 


RDW Coeff of Jason   (11.0-15.0)  %


 


Plt Count   (150-400)  K/uL


 


MPV   (7.40-12.00)  fL


 


Neut % (Auto)   (48.0-80.0)  %


 


Lymph % (Auto)   (16.0-40.0)  %


 


Mono % (Auto)   (0.0-15.0)  %


 


Eos % (Auto)   (0.0-7.0)  %


 


Baso % (Auto)   (0.0-1.5)  %


 


Neut # (Auto)   (1.4-5.7)  K/uL


 


Lymph # (Auto)   (0.6-2.4)  K/uL


 


Mono # (Auto)   (0.0-0.8)  K/uL


 


Eos # (Auto)   (0.0-0.7)  K/uL


 


Baso # (Auto)   (0.0-0.1)  K/uL


 


Nucleated RBC %   /100WBC


 


Nucleated RBCs #   K/uL


 


INR   


 


APTT   (18.6-31.3)  SEC


 


Sodium   (136-145)  mmol/L


 


Potassium   (3.5-5.1)  mmol/L


 


Chloride   ()  mmol/L


 


Carbon Dioxide   (21.0-32.0)  mmol/L


 


BUN   (7.0-18.0)  mg/dL


 


Creatinine   (0.6-1.0)  mg/dL


 


Est Cr Clr Drug Dosing   mL/min


 


Estimated GFR (MDRD)   ml/min


 


Glucose   ()  mg/dL


 


Calcium   (8.5-10.1)  mg/dL


 


Magnesium  2.1  (1.8-2.4)  mg/dL


 


Total Bilirubin   (0.2-1.0)  mg/dL


 


AST   (15-37)  IU/L


 


ALT   (14-63)  IU/L


 


Alkaline Phosphatase   ()  U/L


 


Troponin I   (0.000-0.056)  ng/mL


 


Total Protein   (6.4-8.2)  g/dL


 


Albumin   (3.4-5.0)  g/dL


 


Globulin   (2.6-4.0)  g/dL


 


Albumin/Globulin Ratio   (0.9-1.6)  











Meds: 





Medications














Discontinued Medications














Generic Name Dose Route Start Last Admin





  Trade Name Freq  PRN Reason Stop Dose Admin


 


Iopamidol  100 ml  21 15:42  21 15:43





  Iopamidol 755 Mg/Ml 500 Ml Multipack Bottle  IVPUSH  21 15:43  100 ml





  ONETIME STA   Administration


 


Sodium Chloride  10 ml  21 13:48  21 14:51





  Sodium Chloride 0.9% 10 Ml Syringe  FLUSH   10 ml





  ASDIRECTED PRN   Administration





  Keep Vein Open  


 


Sodium Chloride  2.5 ml  21 13:48  21 14:51





  Sodium Chloride 0.9% 2.5 Ml Syringe  FLUSH   2.5 ml





  ASDIRECTED PRN   Administration





  Keep Vein Open  


 


Sodium Chloride  10 ml  21 13:48  21 14:51





  Sodium Chloride 0.9% 10 Ml Sdv  IV   10 ml





  ASDIRECTED PRN   Administration





  IV Use  














Departure





- Departure


Time of Disposition: 16:28


Disposition: DC/Tfer to Acute Hospital [facility]


Condition: Serious


Clinical Impression: 


 CVA (cerebral vascular accident)








- Discharge Information


Referrals: 


Cecilio Haynes MD [Primary Care Provider] - 


Forms:  ED Department Discharge





Sepsis Event Note (ED)





- Evaluation


Sepsis Screening Result: No Definite Risk





- Focused Exam


Vital Signs: 





                                   Vital Signs











  Temp Pulse Resp BP Pulse Ox


 


 21 16:10  35.7 C L  77  22 H  162/100 H  99


 


 21 15:48   71   151/54 H  98


 


 21 15:17   63   168/64 H  98


 


 21 14:35   73  18  135/60  98


 


 21 14:23   69  17  147/58 H  98


 


 21 14:17   61  15  151/58 H  100


 


 21 14:11   62   166/69 H  99


 


 21 14:07   71   174/79 H  96


 


 21 13:52   77  17  189/66 H  96


 


 21 13:48   69   189/66 H  97














- My Orders


Last 24 Hours: 





My Active Orders





21 13:48


Peripheral IV Insertion Adult [OM.PC] Stat 


Peripheral IV Insertion Adult [OM.PC] Stat 


Resuscitation Status Stat 














- Assessment/Plan


Last 24 Hours: 





My Active Orders





21 13:48


Peripheral IV Insertion Adult [OM.PC] Stat 


Peripheral IV Insertion Adult [OM.PC] Stat 


Resuscitation Status Stat

## 2021-08-17 NOTE — CT
DATE: 08/17/2021. 



CLINICAL HISTORY:



Patient with acute neurological deficit. 



TECHNIQUE:



Standard helical CT image acquisition of the brain following by standard 

helical CT image acquisition through the head and neck after intravenous 

contrast bolus enhancement. Multiplanar reconstructed images performed on a 

separate workstation.



COMPARISON:



Head CT dated 05/31/2018. 



FINDINGS:



CT HEAD:



There is no intracranial hemorrhage. No extra-axial collection, mass 

effect, or midline shift. Patchy hypoattenuation within the white matter of 

both hemispheres, more prominent within the left hemisphere which is likely 

secondary to chronic ischemia from the left cervical ICA occlusion, likely 

reflects sequela of chronic small vessel ischemia. Mild to moderate 

generalized parenchymal volume loss with resulting prominence of cerebral 

sulci and the ventricular system. 



The calvarium is unremarkable.



The orbits are unremarkable.



The paranasal sinuses are unremarkable.



The mastoid air cells are unremarkable.



The soft tissues are unremarkable.



CT ANGIOGRAM HEAD AND NECK:



The origins of the great vessels from the aortic arch are patent. The right 

vertebral artery is occluded at its origin. The origin of the left 

vertebral artery is patent. 



The common carotid arteries are patent. The proximal left internal carotid 

artery is occluded just distal to its origin, with reconstitution 

intracranially at the level of the [place]. There is severe (at 

least 70 percent) atherosclerotic stenosis at the origin of the right 

internal carotid artery by NASCET criteria. The more distal cervical 

segment of the right internal carotid artery is patent. 



The cervical segment of the left vertebral artery is patent. 



No intracranial proximal large vessel occlusion. Scattered intracranial 

atherosclerotic disease but without resulting flow-limiting luminal 

stenosis. 



The visualized lung apices are unremarkable.



The thyroid gland is unremarkable.



Postsurgical changes related to decompression and posterior fusion of the 

C3-C7 levels of the cervical spine. 



IMPRESSION:



1. No CT evidence of acute intracranial abnormality.



2. Senescent changes including generalized parenchymal volume loss and 

findings most consistent with sequela of chronic small vessel ischemia. 

There is asymmetric chronic ischemic changes within the left hemisphere 

which is likely secondary to the occlusion of the cervical left internal 

carotid artery. 



3. No intracranial proximal large vessel occlusion or flow-limiting luminal 

stenosis. 



4. 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 

distal to its origin, with reconstitution intracranially at the level of 

the [place]. 



5. Severe (at least 70 percent) atherosclerotic stenosis at the origin of 

the right internal carotid artery by NASCET criteria. 



6. Occlusion of the right vertebral artery at its origin.



Please note that all CT scans at this facility use dose modulation, 

iterative reconstruction, and/or weight-based dosing when appropriate to 

reduce radiation dose to as low as reasonably achievable.



Dictated by Derik Dejesus MD @ 8/17/2021 8:48:23 PM



Signed by Dr. Derik Dejesus @ Aug 17 2021  8:48PM

## 2021-08-17 NOTE — CT
DATE: 08/17/2021. 



CLINICAL HISTORY:



Patient with acute neurological deficit. 



TECHNIQUE:



Standard helical CT image acquisition of the brain following by standard 

helical CT image acquisition through the head and neck after intravenous 

contrast bolus enhancement. Multiplanar reconstructed images performed on a 

separate workstation.



COMPARISON:



Head CT dated 05/31/2018. 



FINDINGS:



CT HEAD:



There is no intracranial hemorrhage. No extra-axial collection, mass 

effect, or midline shift. Patchy hypoattenuation within the white matter of 

both hemispheres, more prominent within the left hemisphere which is likely 

secondary to chronic ischemia from the left cervical ICA occlusion, likely 

reflects sequela of chronic small vessel ischemia. Mild to moderate 

generalized parenchymal volume loss with resulting prominence of cerebral 

sulci and the ventricular system. 



The calvarium is unremarkable.



The orbits are unremarkable.



The paranasal sinuses are unremarkable.



The mastoid air cells are unremarkable.



The soft tissues are unremarkable.



CT ANGIOGRAM HEAD AND NECK:



The origins of the great vessels from the aortic arch are patent. The right 

vertebral artery is occluded at its origin. The origin of the left 

vertebral artery is patent. 



The common carotid arteries are patent. The proximal left internal carotid 

artery is occluded just distal to its origin, with reconstitution 

intracranially at the level of the [place]. There is severe (at 

least 70 percent) atherosclerotic stenosis at the origin of the right 

internal carotid artery by NASCET criteria. The more distal cervical 

segment of the right internal carotid artery is patent. 



The cervical segment of the left vertebral artery is patent. 



No intracranial proximal large vessel occlusion. Scattered intracranial 

atherosclerotic disease but without resulting flow-limiting luminal 

stenosis. 



The visualized lung apices are unremarkable.



The thyroid gland is unremarkable.



Postsurgical changes related to decompression and posterior fusion of the 

C3-C7 levels of the cervical spine. 



IMPRESSION:



1. No CT evidence of acute intracranial abnormality.



2. Senescent changes including generalized parenchymal volume loss and 

findings most consistent with sequela of chronic small vessel ischemia. 

There is asymmetric chronic ischemic changes within the left hemisphere 

which is likely secondary to the occlusion of the cervical left internal 

carotid artery. 



3. No intracranial proximal large vessel occlusion or flow-limiting luminal 

stenosis. 



4. 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 

distal to its origin, with reconstitution intracranially at the level of 

the [place]. 



5. Severe (at least 70 percent) atherosclerotic stenosis at the origin of 

the right internal carotid artery by NASCET criteria. 



6. Occlusion of the right vertebral artery at its origin.



Please note that all CT scans at this facility use dose modulation, 

iterative reconstruction, and/or weight-based dosing when appropriate to 

reduce radiation dose to as low as reasonably achievable.



Dictated by Derik Dejesus MD @ 8/17/2021 8:47:58 PM



Signed by Dr. Derik Dejesus @ Aug 17 2021  8:47PM

## 2021-08-17 NOTE — PCM.EKG
** #1 Interpretation


EKG Date: 08/17/21


Time: 13:49


Rhythm: NSR


Rate (Beats/Min): 64


Axis: Normal


P-Wave: Present


QRS: Normal


ST-T: Normal


QT: Normal


Comparison: No Change (1/3/20)


EKG Interpretation Comments: 





Sinus Rhythm

## 2021-08-17 NOTE — CT
DATE: 08/17/2021. 



CLINICAL HISTORY:



Patient with acute neurological deficit. 



TECHNIQUE:



Standard helical CT image acquisition of the brain following by standard 

helical CT image acquisition through the head and neck after intravenous 

contrast bolus enhancement. Multiplanar reconstructed images performed on a 

separate workstation.



COMPARISON:



Head CT dated 05/31/2018. 



FINDINGS:



CT HEAD:



There is no intracranial hemorrhage. No extra-axial collection, mass 

effect, or midline shift. Patchy hypoattenuation within the white matter of 

both hemispheres, more prominent within the left hemisphere which is likely 

secondary to chronic ischemia from the left cervical ICA occlusion, likely 

reflects sequela of chronic small vessel ischemia. Mild to moderate 

generalized parenchymal volume loss with resulting prominence of cerebral 

sulci and the ventricular system. 



The calvarium is unremarkable.



The orbits are unremarkable.



The paranasal sinuses are unremarkable.



The mastoid air cells are unremarkable.



The soft tissues are unremarkable.



CT ANGIOGRAM HEAD AND NECK:



The origins of the great vessels from the aortic arch are patent. The right 

vertebral artery is occluded at its origin. The origin of the left 

vertebral artery is patent. 



The common carotid arteries are patent. The proximal left internal carotid 

artery is occluded just distal to its origin, with reconstitution 

intracranially at the level of the [place]. There is severe (at 

least 70 percent) atherosclerotic stenosis at the origin of the right 

internal carotid artery by NASCET criteria. The more distal cervical 

segment of the right internal carotid artery is patent. 



The cervical segment of the left vertebral artery is patent. 



No intracranial proximal large vessel occlusion. Scattered intracranial 

atherosclerotic disease but without resulting flow-limiting luminal 

stenosis. 



The visualized lung apices are unremarkable.



The thyroid gland is unremarkable.



Postsurgical changes related to decompression and posterior fusion of the 

C3-C7 levels of the cervical spine. 



IMPRESSION:



1. No CT evidence of acute intracranial abnormality.



2. Senescent changes including generalized parenchymal volume loss and 

findings most consistent with sequela of chronic small vessel ischemia. 

There is asymmetric chronic ischemic changes within the left hemisphere 

which is likely secondary to the occlusion of the cervical left internal 

carotid artery. 



3. No intracranial proximal large vessel occlusion or flow-limiting luminal 

stenosis. 



4. 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 

distal to its origin, with reconstitution intracranially at the level of 

the [place]. 



5. Severe (at least 70 percent) atherosclerotic stenosis at the origin of 

the right internal carotid artery by NASCET criteria. 



6. Occlusion of the right vertebral artery at its origin.



Please note that all CT scans at this facility use dose modulation, 

iterative reconstruction, and/or weight-based dosing when appropriate to 

reduce radiation dose to as low as reasonably achievable.



Dictated by Derik Dejesus MD @ 8/17/2021 8:48:50 PM



Signed by Dr. Derik Dejesus @ Aug 17 2021  8:48PM

## 2021-08-18 NOTE — EDM.PDOC
ED HPI GENERAL MEDICAL PROBLEM





- General


Chief Complaint: Genitourinary Problem


Stated Complaint: UTI


Time Seen by Provider: 21 03:03





- History of Present Illness


INITIAL COMMENTS - FREE TEXT/NARRATIVE: 





HISTORY AND PHYSICAL:





History of present illness:


This is a 81-year-old female with a history significant for dementia, UTIs, CVA,

CAD, CHF, chronic renal insufficiency, who presented to the ED earlier today 

secondary to concerns of a stroke.  Patient was noted to have left-sided 

weakness with an abnormal CT scan that may be amenable to interventional 

therapy.  Patient was transferred to [facility] for definitive management 

and evaluation for neuro interventional treatment.  At the time of presentation 

to [facility], the patient was having weakness to her left upper and lower 

extremity.  At the time of transfer by EMS to [facility], it was reported 

that her weakness had resolved and that she was back to baseline.  I received a 

call from Dr. Herrera at approximately 7:30 PM reporting that the patient had been 

seen and evaluated by neurology and that the patient is not a candidate for any 

interventional procedure at this time.  Given the capacity of the ED at [facility] as well as the family request, Dr. Herrera had requested that we accept 

the patient back in transfer to assess and treat her agitation prior to 

discharging her back to [facility].  After my discussion with Dr. Herrera 

he reported that patient was seen and evaluated by neurology and they had 

reviewed her radiological studies I did not feel that the patient's radiological

findings were amenable to any interventional procedure.  Per Dr. Herrera there was 

concerns regarding the patient's agitation whether or not patient would be 

accepted at [place] [facility].  Given that we do have the capacity to care for the patient 

in our hospital he requested to see if we would be amenable to accepting patient

for transfer to assist with her agitation and if not then we can admit for 

observation which is something they are unable to do at this time secondary to 

being above capacity.  During her evaluation, the patient was noted by Dr. Herrera 

to have a urinary tract infection which she received a dose of IV antibiotics 

prior to transfer for.  





After calling back Dr. Herrera, he confirmed that patient was seen by neurology and

that they did not feel the patients clot was not amenable to interventional 

radiology procedure as this was a chronic clot.





After patient received in the ED here, I called [facility] and it appears 

that no IV antibiotics were given as it was felt that the UA was more consistent

with contamination given the large number of epithelial cells so antibiotics 

were canceled.











Remainder the history from the patient at this time is difficult to obtain 

secondary to patient being a poor historian and not able to answer questions 

appropriately.  Patient does repeat that she is doing well when asked questions.

 Patient is otherwise sleeping in the room and does not appear to be agitated or

aggressive in any way.








Review of systems: 


As per history of present illness and below otherwise all systems reviewed and 

negative.





Past medical history: 


As per history of present illness and as reviewed below otherwise 

noncontributory.





Surgical history: 


As per history of present illness and as reviewed below otherwise 

noncontributory.





Social history: 


No reported history of drug abuse.





Family history: 


As per history of present illness and as reviewed below otherwise 

noncontributory.





Physical exam:





This patient was seen and evaluated during the  SARS-CoV-2 novel coronavirus

pandemic period.  Community viral transmission is ongoing at time of this 

encounter and the emergency department is operating under pandemic response 

procedures.





Constitutional: Patient is oriented to person, patient is disoriented to time 

and place.  Appears well-developed and well-nourished.  No distress.


 HEENT: Moist mucous membranes


 Head: Normocephalic and atraumatic


 Eyes: Right eye exhibits no discharge.  Left eye exhibits no discharge.  No 

scleral icterus


 Neck: Normal range of motion.  No tracheal deviation present.


 Cardiovascular: Normal rate and regular rhythm.


 Pulmonary: Effort normal, no respiratory distress.


Abd:  Soft, nondistended, no rebound/guarding, no tenderness at Mcberney's 

point, no Guerrero's sign.  Pt does not present with an exam that would be 

consistent with an acute surgical abdomen at this time


 Musculoskeletal: Normal range of motion


 Neurologic: Alert and oriented to person.  Patient is moving her left upper and

left lower extremity well.  Patient is easily arousable.


 Skin: Pink, warm and dry.  


 Psychiatric: Normal mood and affect.  Behavior is normal.  Judgment and thought

content normal.


 Nursing note and vital signs have been reviewed





Patient's ER physical exam is significant for a well-developed well-nourished 

81-year-old female who is resting comfortably and sleeping in bed.  Patient is 

moving her upper extremities well.  Patient has excellent hand grasp.  Patient 

is able to speak in clear language but does not appear to be answering questions

appropriately.








Assessment and plan:


81-year-old female who presents to the ER today from [facility] as a transf

er for evaluation of agitation.  At this time, the patient is resting 

comfortably and there is no evidence of agitation or aggressive behavior by the 

patient.  It appears that the patient did have a urinary tract infection while 

at [facility] which was treated with antibiotics.  Patient will get started 

on Bactrim DS here in the ED.  At this time, I feel the patient is stable for 

transfer back to [place] [facility] on antibiotics.  Patient will need follow-up with her 

primary doctor at [place] [facility] and will need further work-up as deemed necessary for a

TIA.  I have contacted [facility] to try to obtain the official neurology 

report however it was reported to our RN that this was a teleneurology consult 

and that there was no official report dictated as of yet.








Message was left on patient's son, Dejan's phone upon patient's arrival here to

the ED.











Definitive disposition and diagnosis as appropriate pending reevaluation and 

review of above.











- Related Data


                                    Allergies











Allergy/AdvReac Type Severity Reaction Status Date / Time


 


cephalexin [From Keflex] Allergy  Other Verified 21 16:07


 


codeine Allergy  Other Verified 21 16:07


 


latex Allergy  Other Verified 21 16:07


 


phosphates Allergy  Other Uncoded 21 16:07











Home Meds: 


                                    Home Meds





Clopidogrel Bisulfate [Clopidogrel] 75 mg PO DAILY 16 [History]


Metoprolol Succinate 25 mg PO DAILY 16 [History]


Nitroglycerin [Nitrostat] 0.4 mg SL .EVERY 5 MINUTES PRN 16 [History]


atorvaSTATin [Lipitor] 20 mg PO BEDTIME 16 [History]


Aspirin 81 mg PO DAILY 16 [History]


Multivit with Minerals/Lutein [Vision Plus Lutein Vitamin] 1 tab PO DAILY 

16 [History]


Ferrous Sulfate 325 mg PO TIDMEALS #90 tablet 16 [Rx]


Acetaminophen 500 mg PO Q4H PRN 17 [History]


Magnesium Hydroxide [Milk of Magnesia] 30 ml PO DAILY PRN 17 [History]


Albuterol Sulfate 2.5 mg INH Q4H PRN 18 [History]


Bumetanide 1 mg PO DAILY #0 18 [Rx]


Mag Hydrox/Aluminum Hyd/Simeth [Mylanta Maximum Strength Liq] 30 ml PO DAILY PRN

20 [History]


Omeprazole 20 mg PO BEDTIME 20 [History]


traMADol [Ultram] 50 mg PO Q6H PRN 20 [History]


Brimonidine Tartrate [Alphagan P 0.1% Ophth Soln] 1 drop EYERT QAM 20 

[History]


Carbamide Peroxide [Debrox 6.5% Otic Soln] 3 drop EARBOTH DAILY PRN 20 

[History]


Menthol/Zinc Oxide [[place] Medicated Body Powd] 0 gm TOP ASDIRECTED PRN 

20 [History]


Benzocaine [Oral Anesthetic] 1 applic DENT Q4H PRN 21 [History]


Clotrimazole [Clotrimazole 1%] 1 applic TOP BID 21 [History]


PARoxetine [Paxil] 40 mg PO DAILY 21 [History]











Past Medical History





- Past Health History


Medical/Surgical History: Denies Medical/Surgical History


HEENT History: Reports: Cataract


Cardiovascular History: Reports: Arrhythmia, Blood Clots/VTE/DVT, CAD, Heart 

Failure, Pacemaker, PVD


Other Cardiovascular History: DVT


Respiratory History: Reports: COPD, Other (See Below)


Other Respiratory History: Emphysema


Gastrointestinal History: Reports: GERD


Other Gastrointestinal History: "heartburn"


Genitourinary History: Reports: Chronic Renal Insuffiency


Other Genitourinary History: Pt came in from [place], CNA reported vaginal 

bleeding that started after breakfast today then went on to be heavy with blood 

clots through the afternoon.


OB/GYN History: Reports: Pregnancy


Musculoskeletal History: Reports: Arthritis, Other (See Below)


Other Musculoskeletal History: chronic hip pain left more than the right


Neurological History: Reports: CVA, Migraines


Other Neuro History: Encephalopathy, facial weakness/hemiphlegia/hemiparesis 

following cerebral infarction,


Psychiatric History: Reports: Alzheimers Disease, Anxiety, Depression


Other Psychiatric History: Violent Behavioral Outbursts


Endocrine/Metabolic History: Reports: None


Hematologic History: Reports: None


Immunologic History: Reports: None


Oncologic (Cancer) History: Reports: None


Dermatologic History: Reports: Other (See Below)


Other Dermatologic History: frequent yeast to abdominal folds and groins





- Infectious Disease History


Infectious Disease History: Reports: Chicken Pox, Measles, Meningitis





- Past Surgical History


Head Surgeries/Procedures: Reports: None


HEENT Surgical History: Reports: None


Cardiovascular Surgical History: Reports: Other (See Below)


Other Cardiovascular Surgeries/Procedures: Pacemaker insertion


Respiratory Surgical History: Reports: None


GI Surgical History: Reports: None


Female  Surgical History: Reports:  Section


Endocrine Surgical History: Reports: None


Neurological Surgical History: Reports: Other (See Below)


Other Neurological Surgeries/Procedures: "back surgery"


Musculoskeletal Surgical History: Reports: None


Other Musculoskeletal Surgeries/Procedures:: neck surgery and back surgery


Oncologic Surgical History: Reports: None


Dermatological Surgical History: Reports: None





Social & Family History





- Family History


Family Medical History: No Pertinent Family History


Neurological: Reports: CVA


Psychiatric: Reports: Abuse, Victim of, Anxiety


Endocrine/Metabolic: Reports: Diabetes, type II


Immunologic: Reports: None





- Tobacco Use


Tobacco Use Status *Q: Unknown Ever Used Tobacco





- Caffeine Use


Caffeine Use: Reports: None


Caffeine Use Comment: unknown, pt chosing not to talk





- Living Situation & Occupation


Living situation: Reports: with Family


Occupation: Unemployed





ED ROS GENERAL





- Review of Systems


Review Of Systems: See Below





ED EXAM, GENERAL





- Physical Exam


Exam: See Below





Course





- Vital Signs


Last Recorded V/S: 


                                Last Vital Signs











Temp  97.9 F   21 02:44


 


Pulse  65   21 02:44


 


Resp  20   21 02:44


 


BP  147/85 H  21 02:44


 


Pulse Ox  98   21 02:44














Departure





- Departure


Time of Disposition: 03:10


Disposition: DC/[facility]fer to [facility]


Condition: Good


Clinical Impression: 


 TIA (transient ischemic attack), Agitation, Urinary tract infection








- Discharge Information


Instructions:  Urinary Tract Infection, Adult, Transient Ischemic Attack


Referrals: 


Cecilio Haynes MD [Primary Care Provider] - 


Forms:  ED Department Discharge


Additional Instructions: 


You were seen and evaluated in the ER earlier today secondary to signs and 

symptoms of a stroke/TIA.  It appears that all your symptoms have resolved.  You

were sent to [facility] for evaluation by their interventional neurology 

service for any potential interventional procedure that they can do.  After 

discussion with the ER doctor at [facility], it appears that neurology was 

consulted and they feel that no intervention is warranted at this time and the 

occlusions were not amenable to any procedure.  While at [facility], they 

were able to diagnose a urinary tract infection and you were started on IV 

antibiotics.  You will be discharged back to [place] and you will get started on 

Bactrim DS 1 tablet p.o. twice daily x10 days.  Please return Ms. Zheng back to 

the ER if she develops any new or concerning symptoms.  Please have her see her 

family doctor in the next 1 to 2 days to be reevaluated.





The following information is given to patients seen in the emergency department 

who are being discharged to home. This information is to outline your options 

for follow-up care. We provide all patients seen in our emergency department 

with a follow-up referral.





The need for follow-up, as well as the timing and circumstances, are variable 

depending upon the specifics of your emergency department visit.





If you don't have a primary care physician on staff, we will provide you with a 

referral. We always advise you to contact your personal physician following an 

emergency department visit to inform them of the circumstance of the visit and 

for follow-up with them and/or the need for any referrals to a consulting 

specialist.





The emergency department will also refer you to a specialist when appropriate. 

This referral assures that you have the opportunity for follow-up care with a 

specialist. All of these measure are taken in an effort to provide you with 

optimal care, which includes your follow-up.





Under all circumstances we always encourage you to contact your private 

physician who remains a resource for coordinating your care. When calling for 

follow-up care, please make the office aware that this follow-up is from your 

recent emergency room visit. If for any reason you are refused follow-up, please

contact the [facility] Emergency Department

at [phone] and asked to speak to the emergency department charge nurse.





[facility]


[address] [place] [address]


Phone: [phone]


Fax: [phone]








[facility]


[address] [place] [address]


Phone: [phone]


Fax: [phone]








Sepsis Event Note (ED)





- Evaluation


Sepsis Screening Result: No Definite Risk





- Focused Exam


Vital Signs: 


                                   Vital Signs











  Temp Pulse Resp BP Pulse Ox


 


 21 02:44  97.9 F  65  20  147/85 H  98

## 2021-11-29 NOTE — CR
Indication:



COVID pneumonitis. Fever.



Technique:



Chest 1 view.



Comparison:



01/03/2020.



Findings/Impression:



Cardiovascular and mediastinum: Pacemaker present. Cardiomegaly. Normal 

pulmonary vasculature.



Lungs and pleural space: Lungs are clear.  No sign of infiltrate or mass.  

No sign of pleural effusion.  No pneumothorax.



Bones and soft tissues: No acute findings.



Dictated by Jose R Horowitz MD @ 11/29/2021 12:14:43 PM



(Electronically Signed)

## 2021-11-29 NOTE — CR
Indication:



Pain and bruising.



Technique:



Left humerus 2 views.



Comparison:



04/21/2014.



Findings:



Bones: Alignment is normal. No fractures or bone lesions. No specific 

finding to explain pain or bruising. 



Joint spaces: Unremarkable.



Soft tissues: Unremarkable.



Dictated by Jose R Horowitz MD @ 11/29/2021 12:20:01 PM



(Electronically Signed)

## 2021-11-29 NOTE — CR
Indication:



Pain and bruising.



Technique:



Left shoulder 2 views.



Comparison:



09/05/2015.



Findings:



Bones: Alignment is normal. No fractures or bone lesions.



Joint spaces: Unremarkable.



Soft tissues: Calcific tendinosis suggested in the rotator cuff tendon. 

Soft tissues otherwise unremarkable.



Dictated by Jose R Horowitz MD @ 11/29/2021 12:16:38 PM



(Electronically Signed)

## 2021-11-29 NOTE — CR
Indication:



Pain and bruising.



Technique:



Left forearm 2 views.



Comparison:



None.



Findings:



Bones: Alignment is normal. No fractures or bone lesions.



Joint spaces: Moderate arthritic changes in the radiocarpal joint. Elbow 

joint is unremarkable.



Soft tissues: Atherosclerosis present. No other soft tissue abnormalities.



Dictated by Jose R Horowitz MD @ 11/29/2021 12:21:36 PM



(Electronically Signed)

## 2021-11-30 NOTE — CT
INDICATION:



Altered mental status, new onset dilated pupils



TECHNIQUE:



CT head without contrast.



COMPARISON:



Head CT 08/17/2021 



FINDINGS:



CSF spaces: Within normal limits for age.  



Brain parenchyma: Cerebral atrophy with old right frontal infarct near the 

vertex. Fairly extensive low density in the deep white matter, more 

prominent within the left centrum semiovale, similar to the prior exam. No 

intracranial bleed or mass effect. 



Skull base and calvarium: Mild mucosal thickening paranasal sinuses. 

Atherosclerosis. The visualized orbits are grossly unremarkable.  No skull 

fractures.  



IMPRESSION:



1. No intracranial bleed or mass effect.



2. Cerebral atrophy with nonspecific white matter disease, likely 

microangiopathy. Old right frontal infarct.



Please note that all CT scans at this facility use dose modulation, 

iterative reconstruction, and/or weight-based dosing when appropriate to 

reduce radiation dose to as low as reasonably achievable.



Dictated by Roberto Nelson MD @ 11/30/2021 6:26:36 PM



(Electronically Signed)

## 2021-12-09 NOTE — US
INDICATION:



Clinical concern for DVT



COMPARISON:



none



TECHNIQUE:



A compression venous ultrasound exam was performed of both lower 

extremities using gray scale imaging, color Doppler and spectral Doppler 

analysis.



FINDINGS:



Sonographic imaging of the lower extremities demonstrates normal 

compressibility and color Doppler venous blood flow within the common 

femoral, deep femoral, and proximal greater saphenous veins. Within the 

thighs the femoral veins are patent and compressible. At a lower level the 

popliteal and posterior tibial veins also show normal compressibility and 

color Doppler venous blood flow.



IMPRESSION:



Normal venous ultrasound exam. No evidence of deep vein thrombosis within 

either the left or right lower extremity.



Dictated by Ann Marie De Los Santos MD @ 12/9/2021 10:24:04 PM



(Electronically Signed)

## 2021-12-09 NOTE — PCM.HP.2
H&P History of Present Illness





- General


Date of Service: 21


Admit Problem/Dx: 


                           Admission Diagnosis/Problem





Admission Diagnosis/Problem      Cellulitis











- History of Present Illness


Initial Comments - Free Text/Narative: 





82 yo female with pmh of coronary artery disease, CVA, congestive heart failure,

and CKD who was recently admited for COVID and treated with remdesivir and 

dexamethasone.  She presents to the ED today from [place] with a tender red leg. 

[place]


  ** Right Leg


Pain Score (Numeric/FACES): 4





- Related Data


Allergies/Adverse Reactions: 


                                    Allergies











Allergy/AdvReac Type Severity Reaction Status Date / Time


 


cephalexin [From Keflex] Allergy  Other Verified 12/10/21 02:40


 


codeine Allergy  Other Verified 12/10/21 02:40


 


latex Allergy  Other Verified 12/10/21 02:40


 


phosphates Allergy  Other Uncoded 12/10/21 02:40











Home Medications: 


                                    Home Meds





Clopidogrel Bisulfate [Clopidogrel] 75 mg PO DAILY 16 [History]


Metoprolol Succinate 25 mg PO DAILY 16 [History]


Nitroglycerin [Nitrostat] 0.4 mg SL .EVERY 5 MINUTES PRN 16 [History]


Multivit with Minerals/Lutein [Vision Plus Lutein Vitamin] 1 tab PO DAILY 

16 [History]


Ferrous Sulfate 325 mg PO TIDMEALS #90 tablet 16 [Rx]


Bumetanide 1 mg PO DAILY #0 18 [Rx]


Carbamide Peroxide [Debrox 6.5% Otic Soln] 3 drop EARBOTH DAILY PRN 20 

[History]


PARoxetine [Paxil] 40 mg PO DAILY 21 [History]


QUEtiapine [SEROquel] 25 mg PO BID 21 [History]


Albuterol/Ipratropium [Combivent Respimat] 1 gm INH Q4H PRN  inhaler 21 

[Rx]


Acetaminophen 500 mg PO Q4H PRN 12/10/21 [History]


Albuterol Sulfate [Albuterol Sulfate Hfa] 2 puff INH Q6H PRN 12/10/21 [History]


Aspirin [Vazalore] 81 mg PO DAILY 12/10/21 [History]


Brimonidine [Alphagan P 0.1% Ophth Soln] 1 drop EYERT DAILY 12/10/21 [History]


Bumetanide [Bumex] 1 mg PO DAILY 12/10/21 [History]


Multivitamin 1 each PO DAILY 12/10/21 [History]


atorvaSTATin Calcium [Atorvastatin Calcium] 20 mg PO BEDTIME 12/10/21 [History]











Past Medical History





- Past Health History


Medical/Surgical History: Denies Medical/Surgical History


HEENT History: Reports: Cataract


Cardiovascular History: Reports: Arrhythmia, Blood Clots/VTE/DVT, CAD, Heart 

Failure, Pacemaker, PVD


Other Cardiovascular History: DVT


Respiratory History: Reports: COPD, Other (See Below)


Other Respiratory History: Emphysema


Gastrointestinal History: Reports: GERD


Other Gastrointestinal History: "heartburn"


Genitourinary History: Reports: Chronic Renal Insuffiency


Other Genitourinary History: No noted vaginal bleeding at this time.


OB/GYN History: Reports: Pregnancy


Musculoskeletal History: Reports: Arthritis, Other (See Below)


Other Musculoskeletal History: chronic hip pain left more than the right


Neurological History: Reports: CVA, Migraines


Other Neuro History: Encephalopathy, facial weakness/hemiphlegia/hemiparesis 

following cerebral infarction,


Psychiatric History: Reports: Alzheimers Disease, Anxiety, Depression


Other Psychiatric History: Violent Behavioral Outbursts


Endocrine/Metabolic History: Reports: None


Hematologic History: Reports: None


Immunologic History: Reports: None


Oncologic (Cancer) History: Reports: None


Dermatologic History: Reports: Other (See Below)


Other Dermatologic History: frequent yeast to abdominal folds and groins





- Infectious Disease History


Infectious Disease History: Reports: Chicken Pox, Measles, Meningitis





- Past Surgical History


Head Surgeries/Procedures: Reports: None


HEENT Surgical History: Reports: None


Cardiovascular Surgical History: Reports: Other (See Below)


Other Cardiovascular Surgeries/Procedures: Pacemaker insertion


Respiratory Surgical History: Reports: None


GI Surgical History: Reports: None


Female  Surgical History: Reports:  Section


Endocrine Surgical History: Reports: None


Neurological Surgical History: Reports: Other (See Below)


Other Neurological Surgeries/Procedures: "back surgery"


Musculoskeletal Surgical History: Reports: None


Other Musculoskeletal Surgeries/Procedures:: neck surgery and back surgery


Oncologic Surgical History: Reports: None


Dermatological Surgical History: Reports: None





Social & Family History





- Family History


Family Medical History: No Pertinent Family History


Neurological: Reports: CVA


Psychiatric: Reports: Abuse, Victim of, Anxiety


Endocrine/Metabolic: Reports: Diabetes, type II


Immunologic: Reports: None





- Tobacco Use


Tobacco Use Status *Q: Never Tobacco User





- Caffeine Use


Caffeine Use: Reports: None


Caffeine Use Comment: unknown, pt chosing not to talk





- Recreational Drug Use


Recreational Drug Use: No





- Living Situation & Occupation


Living situation: Reports: with Family


Occupation: Unemployed





H&P Review of Systems





- Review of Systems:


Review Of Systems: Comprehensive ROS is negative, except as noted in HPI.





Exam





- Exam


Exam: See Below





- Vital Signs


Vital Signs: 


                                Last Vital Signs











Temp  36.9 C   21 18:41


 


Pulse  84   21 18:41


 


Resp  18   21 18:41


 


BP  189/91 H  21 18:41


 


Pulse Ox  91 L  21 18:41














- Exam


General: Alert, Oriented


HEENT: Mucosa Moist & Pink


Lungs: Clear to Auscultation, Normal Respiratory Effort


Cardiovascular: Regular Rate, Regular Rhythm


GI/Abdominal Exam: Normal Bowel Sounds, Soft, Non-Tender


Extremities: Non-Tender, No Pedal Edema





- Patient Data


Lab Results Last 24 hrs: 


                         Laboratory Results - last 24 hr











  21 Range/Units





  17:22 17:44 18:18 


 


WBC    17.58 H  (4.0-11.0)  K/uL


 


RBC    5.11  (4.30-5.90)  M/uL


 


Hgb    15.6  (12.0-16.0)  g/dL


 


Hct    47.5 H  (36.0-46.0)  %


 


MCV    93.0  (80.0-98.0)  fL


 


MCH    30.5  (27.0-32.0)  pg


 


MCHC    32.8  (31.0-37.0)  g/dL


 


RDW Std Deviation    48.9  (28.0-62.0)  fl


 


RDW Coeff of Jason    15  (11.0-15.0)  %


 


Plt Count    278  (150-400)  K/uL


 


MPV    12.20 H  (7.40-12.00)  fL


 


Neut % (Auto)    86.6 H  (48.0-80.0)  %


 


Lymph % (Auto)    7.2 L  (16.0-40.0)  %


 


Mono % (Auto)    5.7  (0.0-15.0)  %


 


Eos % (Auto)    0.4  (0.0-7.0)  %


 


Baso % (Auto)    0.1  (0.0-1.5)  %


 


Neut # (Auto)    15.2 H  (1.4-5.7)  K/uL


 


Lymph # (Auto)    1.3  (0.6-2.4)  K/uL


 


Mono # (Auto)    1.0 H  (0.0-0.8)  K/uL


 


Eos # (Auto)    0.1  (0.0-0.7)  K/uL


 


Baso # (Auto)    0.0  (0.0-0.1)  K/uL


 


Nucleated RBC %    0.0  /100WBC


 


Nucleated RBCs #    0  K/uL


 


Sodium     (136-145)  mmol/L


 


Potassium     (3.5-5.1)  mmol/L


 


Chloride     ()  mmol/L


 


Carbon Dioxide     (21.0-32.0)  mmol/L


 


BUN     (7.0-18.0)  mg/dL


 


Creatinine     (0.6-1.0)  mg/dL


 


Est Cr Clr Drug Dosing     


 


Estimated GFR (MDRD)     ml/min


 


Glucose     ()  mg/dL


 


Lactic Acid   3.3 H*   (0.4-2.0)  mmol/L


 


Calcium     (8.5-10.1)  mg/dL


 


Total Bilirubin     (0.2-1.0)  mg/dL


 


AST     (15-37)  IU/L


 


ALT     (14-63)  IU/L


 


Alkaline Phosphatase     ()  U/L


 


Total Protein     (6.4-8.2)  g/dL


 


Albumin     (3.4-5.0)  g/dL


 


Globulin     (2.6-4.0)  g/dL


 


Albumin/Globulin Ratio     (0.9-1.6)  


 


SARS-CoV-2 RNA (VIVI)  POSITIVE H    (NEGATIVE)  














  21 Range/Units





  18:18 


 


WBC   (4.0-11.0)  K/uL


 


RBC   (4.30-5.90)  M/uL


 


Hgb   (12.0-16.0)  g/dL


 


Hct   (36.0-46.0)  %


 


MCV   (80.0-98.0)  fL


 


MCH   (27.0-32.0)  pg


 


MCHC   (31.0-37.0)  g/dL


 


RDW Std Deviation   (28.0-62.0)  fl


 


RDW Coeff of Jason   (11.0-15.0)  %


 


Plt Count   (150-400)  K/uL


 


MPV   (7.40-12.00)  fL


 


Neut % (Auto)   (48.0-80.0)  %


 


Lymph % (Auto)   (16.0-40.0)  %


 


Mono % (Auto)   (0.0-15.0)  %


 


Eos % (Auto)   (0.0-7.0)  %


 


Baso % (Auto)   (0.0-1.5)  %


 


Neut # (Auto)   (1.4-5.7)  K/uL


 


Lymph # (Auto)   (0.6-2.4)  K/uL


 


Mono # (Auto)   (0.0-0.8)  K/uL


 


Eos # (Auto)   (0.0-0.7)  K/uL


 


Baso # (Auto)   (0.0-0.1)  K/uL


 


Nucleated RBC %   /100WBC


 


Nucleated RBCs #   K/uL


 


Sodium  132 L  (136-145)  mmol/L


 


Potassium  6.0 H  (3.5-5.1)  mmol/L


 


Chloride  96 L  ()  mmol/L


 


Carbon Dioxide  23.8  (21.0-32.0)  mmol/L


 


BUN  101 H  (7.0-18.0)  mg/dL


 


Creatinine  2.3 H  (0.6-1.0)  mg/dL


 


Est Cr Clr Drug Dosing  TNP  


 


Estimated GFR (MDRD)  20.3  ml/min


 


Glucose  878 H*  ()  mg/dL


 


Lactic Acid   (0.4-2.0)  mmol/L


 


Calcium  9.8  (8.5-10.1)  mg/dL


 


Total Bilirubin  0.8  (0.2-1.0)  mg/dL


 


AST  44 H  (15-37)  IU/L


 


ALT  44  (14-63)  IU/L


 


Alkaline Phosphatase  185 H  ()  U/L


 


Total Protein  7.4  (6.4-8.2)  g/dL


 


Albumin  2.8 L  (3.4-5.0)  g/dL


 


Globulin  4.6 H  (2.6-4.0)  g/dL


 


Albumin/Globulin Ratio  0.6 L  (0.9-1.6)  


 


SARS-CoV-2 RNA (VIVI)   (NEGATIVE)  











Result Diagrams: 


                                 12/10/21 05:40





                                 12/10/21 06:21


[facility] Results Last 24 hrs: 


                                  Microbiology











 21 18:18 Anaerobic Blood Culture - Final





 Blood - Venous - Lab Draw 


 


 21 17:56 Anaerobic Blood Culture - Final





 Blood - Venous 














Sepsis Event Note





- Evaluation


Sepsis Screening Result: No Definite Risk





- Focused Exam


Vital Signs: 


                                   Vital Signs











  Temp Pulse Resp BP Pulse Ox


 


 21 18:41  36.9 C  84  18  189/91 H  91 L


 


 21 17:19  36.9 C  87  16  183/86 H  90 L














- Problem List


(1) Hyperglycemia


SNOMED Code(s): 11404444


   ICD Code: R73.9 - HYPERGLYCEMIA, UNSPECIFIED   Status: Acute   Current Visit:

 Yes   





(2) Sepsis


SNOMED Code(s): 59960193


   ICD Code: A41.9 - SEPSIS, UNSPECIFIED ORGANISM   Status: Acute   Current 

Visit: Yes   





(3) Acute on chronic renal insufficiency


SNOMED Code(s): 781155040


   ICD Code: N28.9 - DISORDER OF KIDNEY AND URETER, UNSPECIFIED; N18.9 - CHRONIC

 KIDNEY DISEASE, UNSPECIFIED   Status: Acute   Current Visit: Yes   





(4) Cellulitis


SNOMED Code(s): 975992817


   ICD Code: L03.90 - CELLULITIS, UNSPECIFIED   Status: Acute   Current Visit: 

Yes   


Problem List Initiated/Reviewed/Updated: Yes


Orders Last 24hrs: 


                               Active Orders 24 hr











 Category Date Time Status


 


 Admission Status [Patient Status] [ADT] Stat ADT  21 20:31 Ordered


 


 Blood Glucose Check, Bedside [RC] Q1HR Care  21 20:32 Ordered


 


 Oxygen Therapy [RC] PRN Care  21 20:32 Ordered


 


 Up ad Emily [RC] ASDIRECTED Care  21 20:32 Ordered


 


 VTE/DVT Education [RC] PER UNIT ROUTINE Care  21 20:32 Ordered


 


 Vital Signs [RC] Q4H Care  21 20:32 Ordered


 


 Regular Diet [DIET] Diet  21 Breakfast Ordered


 


 CXR [Chest 1V Frontal] [CR] Stat Exams  21 20:20 Ordered


 


 BMP [BASIC METABOLIC PANEL,BMP] [CHEM] Stat Lab  21 22:00 Ordered


 


 CBC WITH AUTO DIFF [HEME] AM Lab  12/10/21 05:11 Ordered


 


 COMPREHENSIVE METABOLIC PN,CMP [CHEM] AM Lab  12/10/21 05:11 Ordered


 


 CULTURE BLOOD [BC] Stat Lab  21 17:56 Results


 


 CULTURE BLOOD [BC] Stat Lab  21 18:18 Results


 


 REFLEX LACTIC ACID YES OR NO [CHEM] Routine Lab  21 18:20 Received


 


 Clindamycin Phosphate in D5W [Cleocin in D5W 600 MG/50 Med  12/10/21 02:00 

Ordered





 ML] 600 mg   





 Premix Bag 1 bag   





 IV Q6H   


 


 Dextrose 50% in Water Med  21 19:38 Active





 50 ml IVPUSH ASDIRECTED PRN   


 


 Enoxaparin [Lovenox] Med  21 20:45 Ordered





 40 mg SUBCUT Q24H   


 


 Glucagon,Human Recombinant [GlucaGen] Med  21 19:38 Active





 1 mg IM ASDIRECTED PRN   


 


 Insulin Regular in 0.9 % NACL [Myxredlin in  UNIT Med  21 20:30 

Ordered





 /100 ML] 100 ml   





 IV TITRATE   


 


 Lactated Ringers [Ringers, Lactated] 1,000 ml Med  21 19:59 Active





 IV .BOLUS   


 


 Pharmacy to Dose - Vancomycin Med  21 20:30 Ordered





 1 dose .XX ASDIRECTED   


 


 Blood Culture x2 Reflex Set [OM.PC] Stat Oth  21 17:27 Ordered


 


 Resuscitation Status Routine Resus Stat  21 20:32 Ordered








                                Medication Orders





Dextrose/Water (50% Dextrose In Water 50 Ml Syringe)  50 ml IVPUSH ASDIRECTED 

PRN


   PRN Reason: Hypoglycemia


Glucagon (Glucagon,Human Recombinant 1 Mg Vial)  1 mg IM ASDIRECTED PRN


   PRN Reason: Hypoglycemia


Lactated Ringer's (Ringers, Lactated)  1,000 mls @ 999 mls/hr IV .BOLUS ONE


   Stop: 21 20:59


Insulin Regular in 0.9 % NACL (Myxredlin In Ns 100 Unit/100 Ml)  100 mls @ 0 

mls/hr IV TITRATE MANE; Protocol


Clindamycin Phosphate 600 mg/ (Premix)  50 mls @ 100 mls/hr IV Q6H MANE


Vancomycin HCl (Pharmacy To Dose - Vancomycin)  1 dose .XX ASDIRECTED MANE








Assessment/Plan Comment:: 





82 yo female admitted for sepsis from lower extremity cellulitis


Sepsis: has received 3 L NS will trend lactic acid, cultures ordered


Cellulitis: vancomycin, clindamycin, Doppler ordered


Hyperglycemia: will place on insulin drip

## 2021-12-09 NOTE — CR
Indication:



Sepsis



Technique:



Chest 1 view



Comparison:



Chest x-ray 11/29/2021



Findings/Impression:



Cardiovascular and mediastinum: Cardiomegaly with atherosclerotic 

calcification. Left-sided dual lead pacemaker. 



Lungs and pleural space: No pleural effusion or pneumothorax. Bronchial 

wall thickening with basilar opacities, patchy atelectasis or early 

pneumonia. 



Bones and soft tissues: Status post median sternotomy.



Dictated by Roberto Nelson MD @ 12/9/2021 9:35:37 PM



(Electronically Signed)
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
## 2021-12-10 NOTE — PN
THC Physician - Brief Progress NeynLPINBHAVV06/10/2021 06:46[facility] [place], [place] - VERONICAN (JUNIOR) - VIRAL MARTINSMIREYAJONIDate of Service 12/10/2021 06:46HPI/Events of 
Note Chart reviewed. On camera, the patient is resting in bed in NAD. Mrs Zheng is an 81 year old lad
y presenting with leg pain. Of note, the patient had recently been admitted with and treated for COVI
D PNA. PMH: CAD, CVA, CHF, pacemaker, PVD, DVT, COPD, CKD.Investigations reviewed.A/P:1. Sepsis due t
o leg cellulitis.Recxd 3L of fluid bolus.Started on Clindamycin and Vancomycin.Follow cultures and de
-escalate as soon as able.Follow LA trend.2. HHSRecxd fluids as mentioned.Is on Insulin gtt.3. Monito
r electrolytes.Hypernatremia, likely related to dehydration 4. PINKY on CKDMonitor electrolytes and Cre
.5. GI/DVT prophylaxis - diet, Lovenox SC (adjusted to GFR), SCDs and mobility protocol.Interventions
 Major-Acute renal failure - evaluation and management, Electrolyte abnormality - evaluation and abhishek
gement, Hyperglycemia - active titration of insulin therapy, Infection - evaluation and management, S
epsis - evaluation and managementElectronically Signed by: MARION HARO) on 12/10/2021 06:49

## 2021-12-10 NOTE — PCM.PN
- General Info


Date of Service: 12/10/21





- Review of Systems


Systems Review Comment:: 





nonverbal





- Patient Data


Vitals - Most Recent: 


                                Last Vital Signs











Temp  36.8 C   12/10/21 04:00


 


Pulse  83   12/09/21 21:50


 


Resp  22 H  12/10/21 07:00


 


BP  140/50 L  12/10/21 07:00


 


Pulse Ox  88 L  12/10/21 07:00











Weight - Most Recent: 68.5 kg


Lab Results Last 24 Hours: 


                         Laboratory Results - last 24 hr











  12/09/21 12/09/21 12/09/21 Range/Units





  17:22 17:44 18:18 


 


WBC    17.58 H  (4.0-11.0)  K/uL


 


RBC    5.11  (4.30-5.90)  M/uL


 


Hgb    15.6  (12.0-16.0)  g/dL


 


Hct    47.5 H  (36.0-46.0)  %


 


MCV    93.0  (80.0-98.0)  fL


 


MCH    30.5  (27.0-32.0)  pg


 


MCHC    32.8  (31.0-37.0)  g/dL


 


RDW Std Deviation    48.9  (28.0-62.0)  fl


 


RDW Coeff of Jason    15  (11.0-15.0)  %


 


Plt Count    278  (150-400)  K/uL


 


MPV    12.20 H  (7.40-12.00)  fL


 


Neut % (Auto)    86.6 H  (48.0-80.0)  %


 


Lymph % (Auto)    7.2 L  (16.0-40.0)  %


 


Mono % (Auto)    5.7  (0.0-15.0)  %


 


Eos % (Auto)    0.4  (0.0-7.0)  %


 


Baso % (Auto)    0.1  (0.0-1.5)  %


 


Neut # (Auto)    15.2 H  (1.4-5.7)  K/uL


 


Lymph # (Auto)    1.3  (0.6-2.4)  K/uL


 


Mono # (Auto)    1.0 H  (0.0-0.8)  K/uL


 


Eos # (Auto)    0.1  (0.0-0.7)  K/uL


 


Baso # (Auto)    0.0  (0.0-0.1)  K/uL


 


Nucleated RBC %    0.0  /100WBC


 


Nucleated RBCs #    0  K/uL


 


Sodium     (136-145)  mmol/L


 


Potassium     (3.5-5.1)  mmol/L


 


Chloride     ()  mmol/L


 


Carbon Dioxide     (21.0-32.0)  mmol/L


 


BUN     (7.0-18.0)  mg/dL


 


Creatinine     (0.6-1.0)  mg/dL


 


Est Cr Clr Drug Dosing     


 


Estimated GFR (MDRD)     ml/min


 


Glucose     ()  mg/dL


 


POC Glucose     (70-99)  mg/dL


 


Lactic Acid   3.3 H*   (0.4-2.0)  mmol/L


 


Calcium     (8.5-10.1)  mg/dL


 


Total Bilirubin     (0.2-1.0)  mg/dL


 


AST     (15-37)  IU/L


 


ALT     (14-63)  IU/L


 


Alkaline Phosphatase     ()  U/L


 


Total Protein     (6.4-8.2)  g/dL


 


Albumin     (3.4-5.0)  g/dL


 


Globulin     (2.6-4.0)  g/dL


 


Albumin/Globulin Ratio     (0.9-1.6)  


 


SARS-CoV-2 RNA (VIVI)  POSITIVE H    (NEGATIVE)  














  12/09/21 12/09/21 12/09/21 Range/Units





  18:18 22:13 22:19 


 


WBC     (4.0-11.0)  K/uL


 


RBC     (4.30-5.90)  M/uL


 


Hgb     (12.0-16.0)  g/dL


 


Hct     (36.0-46.0)  %


 


MCV     (80.0-98.0)  fL


 


MCH     (27.0-32.0)  pg


 


MCHC     (31.0-37.0)  g/dL


 


RDW Std Deviation     (28.0-62.0)  fl


 


RDW Coeff of Jason     (11.0-15.0)  %


 


Plt Count     (150-400)  K/uL


 


MPV     (7.40-12.00)  fL


 


Neut % (Auto)     (48.0-80.0)  %


 


Lymph % (Auto)     (16.0-40.0)  %


 


Mono % (Auto)     (0.0-15.0)  %


 


Eos % (Auto)     (0.0-7.0)  %


 


Baso % (Auto)     (0.0-1.5)  %


 


Neut # (Auto)     (1.4-5.7)  K/uL


 


Lymph # (Auto)     (0.6-2.4)  K/uL


 


Mono # (Auto)     (0.0-0.8)  K/uL


 


Eos # (Auto)     (0.0-0.7)  K/uL


 


Baso # (Auto)     (0.0-0.1)  K/uL


 


Nucleated RBC %     /100WBC


 


Nucleated RBCs #     K/uL


 


Sodium  132 L   141  (136-145)  mmol/L


 


Potassium  6.0 H   5.6 H  (3.5-5.1)  mmol/L


 


Chloride  96 L   105  ()  mmol/L


 


Carbon Dioxide  23.8   22.1  (21.0-32.0)  mmol/L


 


BUN  101 H   93 H  (7.0-18.0)  mg/dL


 


Creatinine  2.3 H   2.1 H  (0.6-1.0)  mg/dL


 


Est Cr Clr Drug Dosing  TNP   TNP  


 


Estimated GFR (MDRD)  20.3   22.5  ml/min


 


Glucose  878 H*   487 H  ()  mg/dL


 


POC Glucose   469 H*   (70-99)  mg/dL


 


Lactic Acid     (0.4-2.0)  mmol/L


 


Calcium  9.8   9.3  (8.5-10.1)  mg/dL


 


Total Bilirubin  0.8    (0.2-1.0)  mg/dL


 


AST  44 H    (15-37)  IU/L


 


ALT  44    (14-63)  IU/L


 


Alkaline Phosphatase  185 H    ()  U/L


 


Total Protein  7.4    (6.4-8.2)  g/dL


 


Albumin  2.8 L    (3.4-5.0)  g/dL


 


Globulin  4.6 H    (2.6-4.0)  g/dL


 


Albumin/Globulin Ratio  0.6 L    (0.9-1.6)  


 


SARS-CoV-2 RNA (VIVI)     (NEGATIVE)  














  12/09/21 12/10/21 12/10/21 Range/Units





  22:49 00:01 01:02 


 


WBC     (4.0-11.0)  K/uL


 


RBC     (4.30-5.90)  M/uL


 


Hgb     (12.0-16.0)  g/dL


 


Hct     (36.0-46.0)  %


 


MCV     (80.0-98.0)  fL


 


MCH     (27.0-32.0)  pg


 


MCHC     (31.0-37.0)  g/dL


 


RDW Std Deviation     (28.0-62.0)  fl


 


RDW Coeff of Jason     (11.0-15.0)  %


 


Plt Count     (150-400)  K/uL


 


MPV     (7.40-12.00)  fL


 


Neut % (Auto)     (48.0-80.0)  %


 


Lymph % (Auto)     (16.0-40.0)  %


 


Mono % (Auto)     (0.0-15.0)  %


 


Eos % (Auto)     (0.0-7.0)  %


 


Baso % (Auto)     (0.0-1.5)  %


 


Neut # (Auto)     (1.4-5.7)  K/uL


 


Lymph # (Auto)     (0.6-2.4)  K/uL


 


Mono # (Auto)     (0.0-0.8)  K/uL


 


Eos # (Auto)     (0.0-0.7)  K/uL


 


Baso # (Auto)     (0.0-0.1)  K/uL


 


Nucleated RBC %     /100WBC


 


Nucleated RBCs #     K/uL


 


Sodium     (136-145)  mmol/L


 


Potassium     (3.5-5.1)  mmol/L


 


Chloride     ()  mmol/L


 


Carbon Dioxide     (21.0-32.0)  mmol/L


 


BUN     (7.0-18.0)  mg/dL


 


Creatinine     (0.6-1.0)  mg/dL


 


Est Cr Clr Drug Dosing     


 


Estimated GFR (MDRD)     ml/min


 


Glucose     ()  mg/dL


 


POC Glucose   335 H  315 H  (70-99)  mg/dL


 


Lactic Acid  5.0 H*    (0.4-2.0)  mmol/L


 


Calcium     (8.5-10.1)  mg/dL


 


Total Bilirubin     (0.2-1.0)  mg/dL


 


AST     (15-37)  IU/L


 


ALT     (14-63)  IU/L


 


Alkaline Phosphatase     ()  U/L


 


Total Protein     (6.4-8.2)  g/dL


 


Albumin     (3.4-5.0)  g/dL


 


Globulin     (2.6-4.0)  g/dL


 


Albumin/Globulin Ratio     (0.9-1.6)  


 


SARS-CoV-2 RNA (VIVI)     (NEGATIVE)  














  12/10/21 12/10/21 12/10/21 Range/Units





  01:59 03:07 04:18 


 


WBC     (4.0-11.0)  K/uL


 


RBC     (4.30-5.90)  M/uL


 


Hgb     (12.0-16.0)  g/dL


 


Hct     (36.0-46.0)  %


 


MCV     (80.0-98.0)  fL


 


MCH     (27.0-32.0)  pg


 


MCHC     (31.0-37.0)  g/dL


 


RDW Std Deviation     (28.0-62.0)  fl


 


RDW Coeff of Jason     (11.0-15.0)  %


 


Plt Count     (150-400)  K/uL


 


MPV     (7.40-12.00)  fL


 


Neut % (Auto)     (48.0-80.0)  %


 


Lymph % (Auto)     (16.0-40.0)  %


 


Mono % (Auto)     (0.0-15.0)  %


 


Eos % (Auto)     (0.0-7.0)  %


 


Baso % (Auto)     (0.0-1.5)  %


 


Neut # (Auto)     (1.4-5.7)  K/uL


 


Lymph # (Auto)     (0.6-2.4)  K/uL


 


Mono # (Auto)     (0.0-0.8)  K/uL


 


Eos # (Auto)     (0.0-0.7)  K/uL


 


Baso # (Auto)     (0.0-0.1)  K/uL


 


Nucleated RBC %     /100WBC


 


Nucleated RBCs #     K/uL


 


Sodium     (136-145)  mmol/L


 


Potassium     (3.5-5.1)  mmol/L


 


Chloride     ()  mmol/L


 


Carbon Dioxide     (21.0-32.0)  mmol/L


 


BUN     (7.0-18.0)  mg/dL


 


Creatinine     (0.6-1.0)  mg/dL


 


Est Cr Clr Drug Dosing     


 


Estimated GFR (MDRD)     ml/min


 


Glucose     ()  mg/dL


 


POC Glucose  181 H  143 H  137 H  (70-99)  mg/dL


 


Lactic Acid     (0.4-2.0)  mmol/L


 


Calcium     (8.5-10.1)  mg/dL


 


Total Bilirubin     (0.2-1.0)  mg/dL


 


AST     (15-37)  IU/L


 


ALT     (14-63)  IU/L


 


Alkaline Phosphatase     ()  U/L


 


Total Protein     (6.4-8.2)  g/dL


 


Albumin     (3.4-5.0)  g/dL


 


Globulin     (2.6-4.0)  g/dL


 


Albumin/Globulin Ratio     (0.9-1.6)  


 


SARS-CoV-2 RNA (VIVI)     (NEGATIVE)  














  12/10/21 12/10/21 12/10/21 Range/Units





  04:58 05:40 05:40 


 


WBC   18.23 H   (4.0-11.0)  K/uL


 


RBC   5.14   (4.30-5.90)  M/uL


 


Hgb   15.4   (12.0-16.0)  g/dL


 


Hct   46.1 H   (36.0-46.0)  %


 


MCV   89.7   (80.0-98.0)  fL


 


MCH   30.0   (27.0-32.0)  pg


 


MCHC   33.4   (31.0-37.0)  g/dL


 


RDW Std Deviation   45.6   (28.0-62.0)  fl


 


RDW Coeff of Jason   14   (11.0-15.0)  %


 


Plt Count   231   (150-400)  K/uL


 


MPV   11.60   (7.40-12.00)  fL


 


Neut % (Auto)   83.2 H   (48.0-80.0)  %


 


Lymph % (Auto)   8.7 L   (16.0-40.0)  %


 


Mono % (Auto)   7.7   (0.0-15.0)  %


 


Eos % (Auto)   0.3   (0.0-7.0)  %


 


Baso % (Auto)   0.1   (0.0-1.5)  %


 


Neut # (Auto)   15.2 H   (1.4-5.7)  K/uL


 


Lymph # (Auto)   1.6   (0.6-2.4)  K/uL


 


Mono # (Auto)   1.4 H   (0.0-0.8)  K/uL


 


Eos # (Auto)   0.1   (0.0-0.7)  K/uL


 


Baso # (Auto)   0.0   (0.0-0.1)  K/uL


 


Nucleated RBC %   0.0   /100WBC


 


Nucleated RBCs #   0   K/uL


 


Sodium     (136-145)  mmol/L


 


Potassium     (3.5-5.1)  mmol/L


 


Chloride     ()  mmol/L


 


Carbon Dioxide     (21.0-32.0)  mmol/L


 


BUN     (7.0-18.0)  mg/dL


 


Creatinine     (0.6-1.0)  mg/dL


 


Est Cr Clr Drug Dosing     


 


Estimated GFR (MDRD)     ml/min


 


Glucose     ()  mg/dL


 


POC Glucose  129 H    (70-99)  mg/dL


 


Lactic Acid    3.5 H*  (0.4-2.0)  mmol/L


 


Calcium     (8.5-10.1)  mg/dL


 


Total Bilirubin     (0.2-1.0)  mg/dL


 


AST     (15-37)  IU/L


 


ALT     (14-63)  IU/L


 


Alkaline Phosphatase     ()  U/L


 


Total Protein     (6.4-8.2)  g/dL


 


Albumin     (3.4-5.0)  g/dL


 


Globulin     (2.6-4.0)  g/dL


 


Albumin/Globulin Ratio     (0.9-1.6)  


 


SARS-CoV-2 RNA (VIVI)     (NEGATIVE)  














  12/10/21 12/10/21 12/10/21 Range/Units





  06:14 06:21 06:56 


 


WBC     (4.0-11.0)  K/uL


 


RBC     (4.30-5.90)  M/uL


 


Hgb     (12.0-16.0)  g/dL


 


Hct     (36.0-46.0)  %


 


MCV     (80.0-98.0)  fL


 


MCH     (27.0-32.0)  pg


 


MCHC     (31.0-37.0)  g/dL


 


RDW Std Deviation     (28.0-62.0)  fl


 


RDW Coeff of Jason     (11.0-15.0)  %


 


Plt Count     (150-400)  K/uL


 


MPV     (7.40-12.00)  fL


 


Neut % (Auto)     (48.0-80.0)  %


 


Lymph % (Auto)     (16.0-40.0)  %


 


Mono % (Auto)     (0.0-15.0)  %


 


Eos % (Auto)     (0.0-7.0)  %


 


Baso % (Auto)     (0.0-1.5)  %


 


Neut # (Auto)     (1.4-5.7)  K/uL


 


Lymph # (Auto)     (0.6-2.4)  K/uL


 


Mono # (Auto)     (0.0-0.8)  K/uL


 


Eos # (Auto)     (0.0-0.7)  K/uL


 


Baso # (Auto)     (0.0-0.1)  K/uL


 


Nucleated RBC %     /100WBC


 


Nucleated RBCs #     K/uL


 


Sodium   145   (136-145)  mmol/L


 


Potassium   4.1   (3.5-5.1)  mmol/L


 


Chloride   109 H   ()  mmol/L


 


Carbon Dioxide   23.5   (21.0-32.0)  mmol/L


 


BUN   81 H   (7.0-18.0)  mg/dL


 


Creatinine   1.7 H   (0.6-1.0)  mg/dL


 


Est Cr Clr Drug Dosing   18.01   


 


Estimated GFR (MDRD)   28.7   ml/min


 


Glucose   158 H   ()  mg/dL


 


POC Glucose  132 H   143 H  (70-99)  mg/dL


 


Lactic Acid     (0.4-2.0)  mmol/L


 


Calcium   9.3   (8.5-10.1)  mg/dL


 


Total Bilirubin   0.9   (0.2-1.0)  mg/dL


 


AST   24   (15-37)  IU/L


 


ALT   32   (14-63)  IU/L


 


Alkaline Phosphatase   141 H   ()  U/L


 


Total Protein   6.3 L   (6.4-8.2)  g/dL


 


Albumin   2.3 L   (3.4-5.0)  g/dL


 


Globulin   4.0   (2.6-4.0)  g/dL


 


Albumin/Globulin Ratio   0.6 L   (0.9-1.6)  


 


SARS-CoV-2 RNA (VIVI)     (NEGATIVE)  











[facility] Results Last 24 Hours: 


                                  Microbiology











 12/09/21 18:18 Anaerobic Blood Culture - Final





 Blood - Venous - Lab Draw 


 


 12/09/21 17:56 Anaerobic Blood Culture - Final





 Blood - Venous 











Med Orders - Current: 


                               Current Medications





Dextrose/Water (50% Dextrose In Water 50 Ml Syringe)  50 ml IVPUSH ASDIRECTED 

PRN


   PRN Reason: Hypoglycemia


Enoxaparin Sodium (Enoxaparin 30 Mg/0.3 Ml Syringe)  30 mg SUBCUT Q24H MANE


   Last Admin: 12/09/21 22:48 Dose:  30 mg


   Documented by: 


Glucagon (Glucagon,Human Recombinant 1 Mg Vial)  1 mg IM ASDIRECTED PRN


   PRN Reason: Hypoglycemia


Insulin Regular in 0.9 % NACL (Myxredlin In Ns 100 Unit/100 Ml)  100 mls @ 4 

mls/hr IV TITRATE MANE; Protocol


   Last Titration: 12/10/21 03:10 Dose:  1 mls/hr, 1 mls/hr


   Documented by: 


Clindamycin Phosphate 600 mg/ (Premix)  50 mls @ 100 mls/hr IV Q6H MANE


   Last Admin: 12/10/21 02:19 Dose:  100 mls/hr


   Documented by: 


Nystatin (Nystatin Topical Powder 15 Gm Bottle)  0 gm TOP BID MANE


Vancomycin HCl (Pharmacy To Dose - Vancomycin)  1 dose .XX ASDIRECTED MANE





Discontinued Medications





Enoxaparin Sodium (Enoxaparin 40 Mg/0.4 Ml Syringe)  40 mg SUBCUT Q24H MANE


   Last Admin: 12/10/21 00:27 Dose:  Not Given


   Documented by: 


Fentanyl (Fentanyl 100 Mcg/2 Ml Sdv)  25 mcg IVPUSH ONETIME ONE


   Stop: 12/10/21 05:58


   Last Admin: 12/10/21 06:04 Dose:  25 mcg


   Documented by: 


Vancomycin HCl 1.25 gm/ Premix  250 mls @ 166.667 mls/hr IV ONETIME ONE


   Stop: 12/09/21 19:14


   Last Admin: 12/09/21 18:51 Dose:  166.667 mls/hr


   Documented by: 


Sodium Chloride (Normal Saline)  1,000 mls @ 1,000 mls/hr IV .Bolus ONE


   Stop: 12/09/21 19:25


   Last Admin: 12/09/21 18:51 Dose:  1,000 mls/hr


   Documented by: 


Clindamycin Phosphate 600 mg/ (Sodium Chloride)  54 mls @ 100 mls/hr IV ONETIME 

ONE


   Stop: 12/09/21 20:09


   Last Admin: 12/09/21 21:30 Dose:  Not Given


   Documented by: 


Lactated Ringer's (Ringers, Lactated)  1,000 mls @ 999 mls/hr IV .BOLUS ONE


   Stop: 12/09/21 20:59


   Last Admin: 12/09/21 20:38 Dose:  999 mls/hr


   Documented by: 


Piperacillin Sod/Tazobactam (Sod 3.375 gm/ Sodium Chloride)  50 mls @ 100 mls/hr

IV Q6H MANE


Insulin Human Regular (Insulin Regular, Human 100 Units/Ml 10 Ml Vial)  10 unit 

IVPUSH ONETIME ONE; Protocol


   Stop: 12/09/21 19:39


   Last Admin: 12/09/21 20:34 Dose:  10 unit


   Documented by: 


Vancomycin HCl (Pharmacy To Dose - Vancomycin)  1 dose .XX ONETIME ONE


   Stop: 12/09/21 17:28


   Last Admin: 12/09/21 17:46 Dose:  Not Given


   Documented by: 











- Exam


General: Alert, Oriented


Neck: Supple


Lungs: Normal Respiratory Effort, Rhonchi


Cardiovascular: Regular Rate, Regular Rhythm


GI/Abdominal Exam: Soft, Non-Tender, No Distention


Extremities: Non-Tender, No Pedal Edema


Skin: Warm, Dry, Intact


Neurological: No New Focal Deficit





- Patient Data


Lab Results Last 24 hrs: 


                         Laboratory Results - last 24 hr











  12/09/21 12/09/21 12/09/21 Range/Units





  17:22 17:44 18:18 


 


WBC    17.58 H  (4.0-11.0)  K/uL


 


RBC    5.11  (4.30-5.90)  M/uL


 


Hgb    15.6  (12.0-16.0)  g/dL


 


Hct    47.5 H  (36.0-46.0)  %


 


MCV    93.0  (80.0-98.0)  fL


 


MCH    30.5  (27.0-32.0)  pg


 


MCHC    32.8  (31.0-37.0)  g/dL


 


RDW Std Deviation    48.9  (28.0-62.0)  fl


 


RDW Coeff of Jason    15  (11.0-15.0)  %


 


Plt Count    278  (150-400)  K/uL


 


MPV    12.20 H  (7.40-12.00)  fL


 


Neut % (Auto)    86.6 H  (48.0-80.0)  %


 


Lymph % (Auto)    7.2 L  (16.0-40.0)  %


 


Mono % (Auto)    5.7  (0.0-15.0)  %


 


Eos % (Auto)    0.4  (0.0-7.0)  %


 


Baso % (Auto)    0.1  (0.0-1.5)  %


 


Neut # (Auto)    15.2 H  (1.4-5.7)  K/uL


 


Lymph # (Auto)    1.3  (0.6-2.4)  K/uL


 


Mono # (Auto)    1.0 H  (0.0-0.8)  K/uL


 


Eos # (Auto)    0.1  (0.0-0.7)  K/uL


 


Baso # (Auto)    0.0  (0.0-0.1)  K/uL


 


Nucleated RBC %    0.0  /100WBC


 


Nucleated RBCs #    0  K/uL


 


Sodium     (136-145)  mmol/L


 


Potassium     (3.5-5.1)  mmol/L


 


Chloride     ()  mmol/L


 


Carbon Dioxide     (21.0-32.0)  mmol/L


 


BUN     (7.0-18.0)  mg/dL


 


Creatinine     (0.6-1.0)  mg/dL


 


Est Cr Clr Drug Dosing     


 


Estimated GFR (MDRD)     ml/min


 


Glucose     ()  mg/dL


 


POC Glucose     (70-99)  mg/dL


 


Lactic Acid   3.3 H*   (0.4-2.0)  mmol/L


 


Calcium     (8.5-10.1)  mg/dL


 


Total Bilirubin     (0.2-1.0)  mg/dL


 


AST     (15-37)  IU/L


 


ALT     (14-63)  IU/L


 


Alkaline Phosphatase     ()  U/L


 


Total Protein     (6.4-8.2)  g/dL


 


Albumin     (3.4-5.0)  g/dL


 


Globulin     (2.6-4.0)  g/dL


 


Albumin/Globulin Ratio     (0.9-1.6)  


 


SARS-CoV-2 RNA (VIVI)  POSITIVE H    (NEGATIVE)  














  12/09/21 12/09/21 12/09/21 Range/Units





  18:18 22:13 22:19 


 


WBC     (4.0-11.0)  K/uL


 


RBC     (4.30-5.90)  M/uL


 


Hgb     (12.0-16.0)  g/dL


 


Hct     (36.0-46.0)  %


 


MCV     (80.0-98.0)  fL


 


MCH     (27.0-32.0)  pg


 


MCHC     (31.0-37.0)  g/dL


 


RDW Std Deviation     (28.0-62.0)  fl


 


RDW Coeff of Jason     (11.0-15.0)  %


 


Plt Count     (150-400)  K/uL


 


MPV     (7.40-12.00)  fL


 


Neut % (Auto)     (48.0-80.0)  %


 


Lymph % (Auto)     (16.0-40.0)  %


 


Mono % (Auto)     (0.0-15.0)  %


 


Eos % (Auto)     (0.0-7.0)  %


 


Baso % (Auto)     (0.0-1.5)  %


 


Neut # (Auto)     (1.4-5.7)  K/uL


 


Lymph # (Auto)     (0.6-2.4)  K/uL


 


Mono # (Auto)     (0.0-0.8)  K/uL


 


Eos # (Auto)     (0.0-0.7)  K/uL


 


Baso # (Auto)     (0.0-0.1)  K/uL


 


Nucleated RBC %     /100WBC


 


Nucleated RBCs #     K/uL


 


Sodium  132 L   141  (136-145)  mmol/L


 


Potassium  6.0 H   5.6 H  (3.5-5.1)  mmol/L


 


Chloride  96 L   105  ()  mmol/L


 


Carbon Dioxide  23.8   22.1  (21.0-32.0)  mmol/L


 


BUN  101 H   93 H  (7.0-18.0)  mg/dL


 


Creatinine  2.3 H   2.1 H  (0.6-1.0)  mg/dL


 


Est Cr Clr Drug Dosing  TNP   TNP  


 


Estimated GFR (MDRD)  20.3   22.5  ml/min


 


Glucose  878 H*   487 H  ()  mg/dL


 


POC Glucose   469 H*   (70-99)  mg/dL


 


Lactic Acid     (0.4-2.0)  mmol/L


 


Calcium  9.8   9.3  (8.5-10.1)  mg/dL


 


Total Bilirubin  0.8    (0.2-1.0)  mg/dL


 


AST  44 H    (15-37)  IU/L


 


ALT  44    (14-63)  IU/L


 


Alkaline Phosphatase  185 H    ()  U/L


 


Total Protein  7.4    (6.4-8.2)  g/dL


 


Albumin  2.8 L    (3.4-5.0)  g/dL


 


Globulin  4.6 H    (2.6-4.0)  g/dL


 


Albumin/Globulin Ratio  0.6 L    (0.9-1.6)  


 


SARS-CoV-2 RNA (VIVI)     (NEGATIVE)  














  12/09/21 12/10/21 12/10/21 Range/Units





  22:49 00:01 01:02 


 


WBC     (4.0-11.0)  K/uL


 


RBC     (4.30-5.90)  M/uL


 


Hgb     (12.0-16.0)  g/dL


 


Hct     (36.0-46.0)  %


 


MCV     (80.0-98.0)  fL


 


MCH     (27.0-32.0)  pg


 


MCHC     (31.0-37.0)  g/dL


 


RDW Std Deviation     (28.0-62.0)  fl


 


RDW Coeff of Jason     (11.0-15.0)  %


 


Plt Count     (150-400)  K/uL


 


MPV     (7.40-12.00)  fL


 


Neut % (Auto)     (48.0-80.0)  %


 


Lymph % (Auto)     (16.0-40.0)  %


 


Mono % (Auto)     (0.0-15.0)  %


 


Eos % (Auto)     (0.0-7.0)  %


 


Baso % (Auto)     (0.0-1.5)  %


 


Neut # (Auto)     (1.4-5.7)  K/uL


 


Lymph # (Auto)     (0.6-2.4)  K/uL


 


Mono # (Auto)     (0.0-0.8)  K/uL


 


Eos # (Auto)     (0.0-0.7)  K/uL


 


Baso # (Auto)     (0.0-0.1)  K/uL


 


Nucleated RBC %     /100WBC


 


Nucleated RBCs #     K/uL


 


Sodium     (136-145)  mmol/L


 


Potassium     (3.5-5.1)  mmol/L


 


Chloride     ()  mmol/L


 


Carbon Dioxide     (21.0-32.0)  mmol/L


 


BUN     (7.0-18.0)  mg/dL


 


Creatinine     (0.6-1.0)  mg/dL


 


Est Cr Clr Drug Dosing     


 


Estimated GFR (MDRD)     ml/min


 


Glucose     ()  mg/dL


 


POC Glucose   335 H  315 H  (70-99)  mg/dL


 


Lactic Acid  5.0 H*    (0.4-2.0)  mmol/L


 


Calcium     (8.5-10.1)  mg/dL


 


Total Bilirubin     (0.2-1.0)  mg/dL


 


AST     (15-37)  IU/L


 


ALT     (14-63)  IU/L


 


Alkaline Phosphatase     ()  U/L


 


Total Protein     (6.4-8.2)  g/dL


 


Albumin     (3.4-5.0)  g/dL


 


Globulin     (2.6-4.0)  g/dL


 


Albumin/Globulin Ratio     (0.9-1.6)  


 


SARS-CoV-2 RNA (VIVI)     (NEGATIVE)  














  12/10/21 12/10/21 12/10/21 Range/Units





  01:59 03:07 04:18 


 


WBC     (4.0-11.0)  K/uL


 


RBC     (4.30-5.90)  M/uL


 


Hgb     (12.0-16.0)  g/dL


 


Hct     (36.0-46.0)  %


 


MCV     (80.0-98.0)  fL


 


MCH     (27.0-32.0)  pg


 


MCHC     (31.0-37.0)  g/dL


 


RDW Std Deviation     (28.0-62.0)  fl


 


RDW Coeff of Jason     (11.0-15.0)  %


 


Plt Count     (150-400)  K/uL


 


MPV     (7.40-12.00)  fL


 


Neut % (Auto)     (48.0-80.0)  %


 


Lymph % (Auto)     (16.0-40.0)  %


 


Mono % (Auto)     (0.0-15.0)  %


 


Eos % (Auto)     (0.0-7.0)  %


 


Baso % (Auto)     (0.0-1.5)  %


 


Neut # (Auto)     (1.4-5.7)  K/uL


 


Lymph # (Auto)     (0.6-2.4)  K/uL


 


Mono # (Auto)     (0.0-0.8)  K/uL


 


Eos # (Auto)     (0.0-0.7)  K/uL


 


Baso # (Auto)     (0.0-0.1)  K/uL


 


Nucleated RBC %     /100WBC


 


Nucleated RBCs #     K/uL


 


Sodium     (136-145)  mmol/L


 


Potassium     (3.5-5.1)  mmol/L


 


Chloride     ()  mmol/L


 


Carbon Dioxide     (21.0-32.0)  mmol/L


 


BUN     (7.0-18.0)  mg/dL


 


Creatinine     (0.6-1.0)  mg/dL


 


Est Cr Clr Drug Dosing     


 


Estimated GFR (MDRD)     ml/min


 


Glucose     ()  mg/dL


 


POC Glucose  181 H  143 H  137 H  (70-99)  mg/dL


 


Lactic Acid     (0.4-2.0)  mmol/L


 


Calcium     (8.5-10.1)  mg/dL


 


Total Bilirubin     (0.2-1.0)  mg/dL


 


AST     (15-37)  IU/L


 


ALT     (14-63)  IU/L


 


Alkaline Phosphatase     ()  U/L


 


Total Protein     (6.4-8.2)  g/dL


 


Albumin     (3.4-5.0)  g/dL


 


Globulin     (2.6-4.0)  g/dL


 


Albumin/Globulin Ratio     (0.9-1.6)  


 


SARS-CoV-2 RNA (VIVI)     (NEGATIVE)  














  12/10/21 12/10/21 12/10/21 Range/Units





  04:58 05:40 05:40 


 


WBC   18.23 H   (4.0-11.0)  K/uL


 


RBC   5.14   (4.30-5.90)  M/uL


 


Hgb   15.4   (12.0-16.0)  g/dL


 


Hct   46.1 H   (36.0-46.0)  %


 


MCV   89.7   (80.0-98.0)  fL


 


MCH   30.0   (27.0-32.0)  pg


 


MCHC   33.4   (31.0-37.0)  g/dL


 


RDW Std Deviation   45.6   (28.0-62.0)  fl


 


RDW Coeff of Jason   14   (11.0-15.0)  %


 


Plt Count   231   (150-400)  K/uL


 


MPV   11.60   (7.40-12.00)  fL


 


Neut % (Auto)   83.2 H   (48.0-80.0)  %


 


Lymph % (Auto)   8.7 L   (16.0-40.0)  %


 


Mono % (Auto)   7.7   (0.0-15.0)  %


 


Eos % (Auto)   0.3   (0.0-7.0)  %


 


Baso % (Auto)   0.1   (0.0-1.5)  %


 


Neut # (Auto)   15.2 H   (1.4-5.7)  K/uL


 


Lymph # (Auto)   1.6   (0.6-2.4)  K/uL


 


Mono # (Auto)   1.4 H   (0.0-0.8)  K/uL


 


Eos # (Auto)   0.1   (0.0-0.7)  K/uL


 


Baso # (Auto)   0.0   (0.0-0.1)  K/uL


 


Nucleated RBC %   0.0   /100WBC


 


Nucleated RBCs #   0   K/uL


 


Sodium     (136-145)  mmol/L


 


Potassium     (3.5-5.1)  mmol/L


 


Chloride     ()  mmol/L


 


Carbon Dioxide     (21.0-32.0)  mmol/L


 


BUN     (7.0-18.0)  mg/dL


 


Creatinine     (0.6-1.0)  mg/dL


 


Est Cr Clr Drug Dosing     


 


Estimated GFR (MDRD)     ml/min


 


Glucose     ()  mg/dL


 


POC Glucose  129 H    (70-99)  mg/dL


 


Lactic Acid    3.5 H*  (0.4-2.0)  mmol/L


 


Calcium     (8.5-10.1)  mg/dL


 


Total Bilirubin     (0.2-1.0)  mg/dL


 


AST     (15-37)  IU/L


 


ALT     (14-63)  IU/L


 


Alkaline Phosphatase     ()  U/L


 


Total Protein     (6.4-8.2)  g/dL


 


Albumin     (3.4-5.0)  g/dL


 


Globulin     (2.6-4.0)  g/dL


 


Albumin/Globulin Ratio     (0.9-1.6)  


 


SARS-CoV-2 RNA (VIVI)     (NEGATIVE)  














  12/10/21 12/10/21 12/10/21 Range/Units





  06:14 06:21 06:56 


 


WBC     (4.0-11.0)  K/uL


 


RBC     (4.30-5.90)  M/uL


 


Hgb     (12.0-16.0)  g/dL


 


Hct     (36.0-46.0)  %


 


MCV     (80.0-98.0)  fL


 


MCH     (27.0-32.0)  pg


 


MCHC     (31.0-37.0)  g/dL


 


RDW Std Deviation     (28.0-62.0)  fl


 


RDW Coeff of Jason     (11.0-15.0)  %


 


Plt Count     (150-400)  K/uL


 


MPV     (7.40-12.00)  fL


 


Neut % (Auto)     (48.0-80.0)  %


 


Lymph % (Auto)     (16.0-40.0)  %


 


Mono % (Auto)     (0.0-15.0)  %


 


Eos % (Auto)     (0.0-7.0)  %


 


Baso % (Auto)     (0.0-1.5)  %


 


Neut # (Auto)     (1.4-5.7)  K/uL


 


Lymph # (Auto)     (0.6-2.4)  K/uL


 


Mono # (Auto)     (0.0-0.8)  K/uL


 


Eos # (Auto)     (0.0-0.7)  K/uL


 


Baso # (Auto)     (0.0-0.1)  K/uL


 


Nucleated RBC %     /100WBC


 


Nucleated RBCs #     K/uL


 


Sodium   145   (136-145)  mmol/L


 


Potassium   4.1   (3.5-5.1)  mmol/L


 


Chloride   109 H   ()  mmol/L


 


Carbon Dioxide   23.5   (21.0-32.0)  mmol/L


 


BUN   81 H   (7.0-18.0)  mg/dL


 


Creatinine   1.7 H   (0.6-1.0)  mg/dL


 


Est Cr Clr Drug Dosing   18.01   


 


Estimated GFR (MDRD)   28.7   ml/min


 


Glucose   158 H   ()  mg/dL


 


POC Glucose  132 H   143 H  (70-99)  mg/dL


 


Lactic Acid     (0.4-2.0)  mmol/L


 


Calcium   9.3   (8.5-10.1)  mg/dL


 


Total Bilirubin   0.9   (0.2-1.0)  mg/dL


 


AST   24   (15-37)  IU/L


 


ALT   32   (14-63)  IU/L


 


Alkaline Phosphatase   141 H   ()  U/L


 


Total Protein   6.3 L   (6.4-8.2)  g/dL


 


Albumin   2.3 L   (3.4-5.0)  g/dL


 


Globulin   4.0   (2.6-4.0)  g/dL


 


Albumin/Globulin Ratio   0.6 L   (0.9-1.6)  


 


SARS-CoV-2 RNA (VIVI)     (NEGATIVE)  











Result Diagrams: 


                                 12/10/21 05:40





                                 12/10/21 06:21


[facility] Results Last 24 hrs: 


                                  Microbiology











 12/09/21 18:18 Anaerobic Blood Culture - Final





 Blood - Venous - Lab Draw 


 


 12/09/21 17:56 Anaerobic Blood Culture - Final





 Blood - Venous 














Sepsis Event Note





- Evaluation


Sepsis Screening Result: Possible Sepsis Risk





- Focused Exam


Vital Signs: 


                                   Vital Signs











  Temp Pulse Resp BP Pulse Ox


 


 12/10/21 07:00    22 H  140/50 L  88 L


 


 12/10/21 06:00    18  165/53 H  90 L


 


 12/10/21 05:00    15  167/68 H  92 L


 


 12/10/21 04:00  36.8 C   15  153/71 H  91 L


 


 12/10/21 03:00    19  149/65 H  94 L


 


 12/10/21 01:00    16  168/72 H  98


 


 12/10/21 00:00    19  168/53 H  96


 


 12/09/21 23:00  37.0 C   14  166/61 H  95


 


 12/09/21 21:50   83  20  170/78 H  95


 


 12/09/21 20:50   89  20  158/41 H  93 L














- Problem List & Annotations


(1) Hyperglycemia


SNOMED Code(s): 32053449


   Code(s): R73.9 - HYPERGLYCEMIA, UNSPECIFIED   Status: Acute   Current Visit: 

Yes   





(2) Sepsis


SNOMED Code(s): 21664470


   Code(s): A41.9 - SEPSIS, UNSPECIFIED ORGANISM   Status: Acute   Current 

Visit: Yes   





(3) Acute on chronic renal insufficiency


SNOMED Code(s): 282602790


   Code(s): N28.9 - DISORDER OF KIDNEY AND URETER, UNSPECIFIED; N18.9 - CHRONIC 

KIDNEY DISEASE, UNSPECIFIED   Status: Acute   Current Visit: Yes   





(4) Cellulitis


SNOMED Code(s): 082261694


   Code(s): L03.90 - CELLULITIS, UNSPECIFIED   Status: Acute   Current Visit: 

Yes   





- Problem List Review


Problem List Initiated/Reviewed/Updated: Yes





- My Orders


Last 24 Hours: 


My Active Orders





12/09/21 20:30


Insulin Regular in 0.9 % NACL [Myxredlin in  UNIT/100 ML] 100 ml IV 

TITRATE 


Pharmacy to Dose - Vancomycin   1 dose .XX ASDIRECTED 





12/09/21 20:31


Admission Status [Patient Status] [ADT] Stat 





12/09/21 20:32


Blood Glucose Check, Bedside [RC] Q1HR 


Oxygen Therapy [RC] PRN 


Up ad Emily [RC] ASDIRECTED 


VTE/DVT Education [RC] PER UNIT ROUTINE 


Vital Signs [RC] Q1H 


Resuscitation Status Routine 





12/09/21 22:00


Enoxaparin [Lovenox]   30 mg SUBCUT Q24H 





12/10/21 02:00


Clindamycin Phosphate in D5W [Cleocin in D5W 600 MG/50 ML] 600 mg   Premix Bag 1

bag IV Q6H 





12/10/21 07:06


REFLEX LACTIC ACID YES OR NO [CHEM] Routine 





12/10/21 09:00


Nystatin [Nystop]   See Dose Instructions  TOP BID 





12/11/21 19:00


VANCOMYCIN RANDOM [CHEM] Timed 














- Plan


Plan:: 





82 yo female admitted for sepsis from lower extremity cellulitis with recent 

admission for COVID


Sepsis: has received 3 L NS, tredning lactic acid, cultures ordered


Cellulitis: vancomycin, clindamycin, Doppler negative,  will add levaquin for 

possible pneumonia


Hyperglycemia/HSS: continue insulin drip

## 2021-12-11 NOTE — PCM.PR.CLI
Central Line Insertion





- Central Line Insertion


Site: femoral (L)


Prep: CDC/MBT Guidelines


Lumen: triple


Gauge: 7Fr


Local Anesthesia - Lidocaine (Xylocaine): 0.5% Plain


Local Anesthetic Volume: 2cc


Ultrasound guided: No


Micropuncture kit used: No


CL Complications: No


Secured with suture: Yes


Post placement confirmation: all ports aspirated, all ports flushed


Dressing applied: by provider


Central line comment: 





Earlier R IJ found to divert into Right Subclavian.  Left femoral was placed. No

complications.

## 2021-12-11 NOTE — PCM.PR.CLI
Central Line Insertion





- Central Line Insertion


Site: internal jugular (R)


Prep: CDC/MBT Guidelines, Sterile Drapes, Chlorhexidine


Lumen: triple


Gauge: 7Fr


Local Anesthesia - Lidocaine (Xylocaine): 0.5% Plain


Local Anesthetic Volume: 2cc


Ultrasound guided: No


Micropuncture kit used: No


CL Complications: No


Secured with suture: Yes


Post placement confirmation: all ports aspirated, all ports flushed


CXR post-procedure: other (ordered, assess by others)


Dressing applied: by nurse


Central line comment: 





Line placed and initially flushed all ports. sutured in place then rechecked 

ports. Distal port no longer ollie blood. Withdrew cath about 1.5 CM and then all

ports drawn and flushed easily.

## 2021-12-11 NOTE — PCM.PN
- General Info


Date of Service: 12/11/21





- Review of Systems


Systems Review Comment:: 





nonverbal,  





- Patient Data


Vitals - Most Recent: 


                                Last Vital Signs











Temp  36.2 C   12/11/21 04:00


 


Pulse  83   12/09/21 21:50


 


Resp  20   12/11/21 06:00


 


BP  150/66 H  12/11/21 06:00


 


Pulse Ox  94 L  12/11/21 06:00











Weight - Most Recent: 68.5 kg


I&O - Last 24 Hours: 


                                 Intake & Output











 12/10/21 12/11/21 12/11/21





 22:59 06:59 14:59


 


Intake Total 50 168 


 


Balance 50 168 











Lab Results Last 24 Hours: 


                         Laboratory Results - last 24 hr











  12/10/21 12/10/21 12/10/21 Range/Units





  14:11 15:13 16:39 


 


POC Glucose  104 H  139 H  106 H  (70-99)  mg/dL














  12/10/21 12/10/21 12/10/21 Range/Units





  17:55 18:39 20:00 


 


POC Glucose  101 H  111 H  118 H  (70-99)  mg/dL














  12/10/21 12/10/21 12/11/21 Range/Units





  21:39 23:58 01:59 


 


POC Glucose  148 H  148 H  115 H  (70-99)  mg/dL














  12/11/21 12/11/21 12/11/21 Range/Units





  04:04 06:04 08:52 


 


POC Glucose  120 H  105 H  121 H  (70-99)  mg/dL














  12/11/21 12/11/21 Range/Units





  10:30 11:24 


 


POC Glucose  122 H  105 H  (70-99)  mg/dL











Heladio Results Last 24 Hours: 


                                  Microbiology











 12/09/21 18:18 Aerobic Blood Culture - Final





 Blood - Venous - Lab Draw Anaerobic Blood Culture - Final


 


 12/09/21 17:56 Aerobic Blood Culture - Final





 Blood - Venous Anaerobic Blood Culture - Final











Med Orders - Current: 


                               Current Medications





Acetaminophen (Acetaminophen 325 Mg Tab)  650 mg PO Q6H PRN


   PRN Reason: Pain/Fever


Dextrose/Water (50% Dextrose In Water 50 Ml Syringe)  50 ml IVPUSH ASDIRECTED 

PRN


   PRN Reason: Hypoglycemia


Enoxaparin Sodium (Enoxaparin 30 Mg/0.3 Ml Syringe)  30 mg SUBCUT Q24H MANE


   Last Admin: 12/10/21 21:45 Dose:  30 mg


   Documented by: 


Glucagon (Glucagon,Human Recombinant 1 Mg Vial)  1 mg IM ASDIRECTED PRN


   PRN Reason: Hypoglycemia


Insulin Regular in 0.9 % NACL (Myxredlin In Ns 100 Unit/100 Ml)  100 mls @ 4 

mls/hr IV TITRATE [facility]; Protocol


   Last Admin: 12/11/21 10:31 Dose:  1 mls/hr, 1 mls/hr


   Documented by: 


Clindamycin Phosphate 600 mg/ (Premix)  50 mls @ 100 mls/hr IV Q6H [facility]


   Last Admin: 12/11/21 14:35 Dose:  100 mls/hr


   Documented by: 


Levofloxacin/Dextrose 750 mg/ (Premix)  150 mls @ 100 mls/hr IV Q48H [facility]


   Last Admin: 12/10/21 08:42 Dose:  100 mls/hr


   Documented by: 


Nystatin (Nystatin Topical Powder 15 Gm Bottle)  0 gm TOP BID [facility]


   Last Admin: 12/11/21 09:25 Dose:  1 applic


   Documented by: 


Oxycodone HCl (Oxycodone 5 Mg Tab)  5 mg PO Q4H PRN


   PRN Reason: Pain


   Last Admin: 12/10/21 18:25 Dose:  5 mg


   Documented by: 


Vancomycin HCl (Pharmacy To Dose - Vancomycin)  1 dose .XX ASDIRECTED MANE





Discontinued Medications





Enoxaparin Sodium (Enoxaparin 40 Mg/0.4 Ml Syringe)  40 mg SUBCUT Q24H [facility]


   Last Admin: 12/10/21 00:27 Dose:  Not Given


   Documented by: 


Fentanyl (Fentanyl 100 Mcg/2 Ml Sdv)  25 mcg IVPUSH ONETIME ONE


   Stop: 12/10/21 05:58


   Last Admin: 12/10/21 06:04 Dose:  25 mcg


   Documented by: 


Vancomycin HCl 1.25 gm/ Premix  250 mls @ 166.667 mls/hr IV ONETIME ONE


   Stop: 12/09/21 19:14


   Last Admin: 12/09/21 18:51 Dose:  166.667 mls/hr


   Documented by: 


Sodium Chloride (Normal Saline)  1,000 mls @ 1,000 mls/hr IV .Bolus ONE


   Stop: 12/09/21 19:25


   Last Admin: 12/09/21 18:51 Dose:  1,000 mls/hr


   Documented by: 


Clindamycin Phosphate 600 mg/ (Sodium Chloride)  54 mls @ 100 mls/hr IV ONETIME 

ONE


   Stop: 12/09/21 20:09


   Last Admin: 12/09/21 21:30 Dose:  Not Given


   Documented by: 


Lactated Ringer's (Ringers, Lactated)  1,000 mls @ 999 mls/hr IV .BOLUS ONE


   Stop: 12/09/21 20:59


   Last Admin: 12/09/21 20:38 Dose:  999 mls/hr


   Documented by: 


Piperacillin Sod/Tazobactam (Sod 3.375 gm/ Sodium Chloride)  50 mls @ 100 mls/hr

IV Q6H MANE


Insulin Human Regular (Insulin Regular, Human 100 Units/Ml 10 Ml Vial)  10 unit 

IVPUSH ONETIME ONE; Protocol


   Stop: 12/09/21 19:39


   Last Admin: 12/09/21 20:34 Dose:  10 unit


   Documented by: 


Midazolam HCl (Midazolam 1 Mg/Ml 2 Ml Sdv)  0.5 mg IVPUSH ONETIME ONE


   Stop: 12/11/21 12:39


   Last Admin: 12/11/21 14:25 Dose:  0.5 mg


   Documented by: 


Midazolam HCl (Midazolam 1 Mg/Ml 2 Ml Sdv) Confirm Administered Dose 2 mg .ROUTE

.STK-MED ONE


   Stop: 12/11/21 12:41


Vancomycin HCl (Pharmacy To Dose - Vancomycin)  1 dose .XX ONETIME ONE


   Stop: 12/09/21 17:28


   Last Admin: 12/09/21 17:46 Dose:  Not Given


   Documented by: 











- Exam


General: Alert, Oriented


Neck: Supple


Lungs: Clear to Auscultation, Normal Respiratory Effort


Cardiovascular: Regular Rate, Regular Rhythm


GI/Abdominal Exam: Normal Bowel Sounds, Soft, Non-Tender


Extremities: No Pedal Edema


Skin: Rash (erythema over right shin)





- Patient Data


Lab Results Last 24 hrs: 


                         Laboratory Results - last 24 hr











  12/10/21 12/10/21 12/10/21 Range/Units





  14:11 15:13 16:39 


 


POC Glucose  104 H  139 H  106 H  (70-99)  mg/dL














  12/10/21 12/10/21 12/10/21 Range/Units





  17:55 18:39 20:00 


 


POC Glucose  101 H  111 H  118 H  (70-99)  mg/dL














  12/10/21 12/10/21 12/11/21 Range/Units





  21:39 23:58 01:59 


 


POC Glucose  148 H  148 H  115 H  (70-99)  mg/dL














  12/11/21 12/11/21 12/11/21 Range/Units





  04:04 06:04 08:52 


 


POC Glucose  120 H  105 H  121 H  (70-99)  mg/dL














  12/11/21 12/11/21 Range/Units





  10:30 11:24 


 


POC Glucose  122 H  105 H  (70-99)  mg/dL











Result Diagrams: 


                                 12/10/21 05:40





                                 12/10/21 06:21


Heladio Results Last 24 hrs: 


                                  Microbiology











 12/09/21 18:18 Aerobic Blood Culture - Final





 Blood - Venous - Lab Draw Anaerobic Blood Culture - Final


 


 12/09/21 17:56 Aerobic Blood Culture - Final





 Blood - Venous Anaerobic Blood Culture - Final














Sepsis Event Note





- Evaluation


Sepsis Screening Result: No Definite Risk





- Focused Exam


Vital Signs: 


                                   Vital Signs











  Temp Resp BP Pulse Ox


 


 12/11/21 06:00   20  150/66 H  94 L


 


 12/11/21 05:00   19  146/54 H  94 L


 


 12/11/21 04:00  36.2 C  17  139/55 L  96


 


 12/11/21 03:00   19  145/54 H  94 L














- Problem List & Annotations


(1) Hyperglycemia


SNOMED Code(s): 08286650


   Code(s): R73.9 - HYPERGLYCEMIA, UNSPECIFIED   Status: Acute   Current Visit: 

Yes   





(2) Sepsis


SNOMED Code(s): 47474997


   Code(s): A41.9 - SEPSIS, UNSPECIFIED ORGANISM   Status: Acute   Current 

Visit: Yes   





(3) Acute on chronic renal insufficiency


SNOMED Code(s): 798460770


   Code(s): N28.9 - DISORDER OF KIDNEY AND URETER, UNSPECIFIED; N18.9 - CHRONIC 

KIDNEY DISEASE, UNSPECIFIED   Status: Acute   Current Visit: Yes   





(4) Cellulitis


SNOMED Code(s): 399046405


   Code(s): L03.90 - CELLULITIS, UNSPECIFIED   Status: Acute   Current Visit: 

Yes   





- Problem List Review


Problem List Initiated/Reviewed/Updated: Yes





- My Orders


Last 24 Hours: 


My Active Orders





12/11/21 Breakfast


Mechanical Soft Diet [DIET] 





12/11/21 10:05


CBC WITH AUTO DIFF [HEME] Routine 


COMPREHENSIVE METABOLIC PN,CMP [CHEM] Routine 





12/11/21 19:00


VANCOMYCIN RANDOM [CHEM] Routine 














- Plan


Plan:: 





82 yo female admitted for sepsis from lower extremity cellulitis


Sepsis: has received 3 L NS on admission, lactic acid normalized,  2/2 blood 

cultures growing gram positive cocci


Cellulitis: vancomycin, clindamycin, Doppler ordered


Hyperglycemia: will continue insulin drip


due to need of frequent blood draws and unavailable to get a PICC, will try to 

obtain central line

## 2021-12-11 NOTE — CR
Indication:



Left femoral line placement 



Technique:



KUB 1 view



Comparison:



None



Findings/Impression:



Left femoral vascular catheter tip terminates medial to the roof of the 

left acetabulum. Moderate amount of stool within the colon. No free air. 

Aortoiliac calcifications. No acute osseous abnormality.



Dictated by Haydee Delacruz MD @ 12/11/2021 5:33:12 PM



(Electronically Signed)

## 2021-12-11 NOTE — CR
Indication:



Atelectasis 



Technique:



Chest 1 view



Comparison:



Same date at 1:36 p.m. 



Findings/Impression:



Stable cardiomediastinal silhouette. Status post median sternotomy. 

Left-sided dual lead pacemaker appears intact. There is retrocardiac no 

pneumothorax or effusion. Linear atelectasis.



Dictated by Haydee Delacruz MD @ 12/11/2021 5:32:01 PM



(Electronically Signed)

## 2021-12-11 NOTE — CR
INDICATION:



Central line placement.



TECHNIQUE:



Chest 1 view.



COMPARISON:



12/09/2021 



FINDINGS:



The cardiomediastinal silhouette is stable. Sequelae of median sternotomy 

again identified. Left chest pacing device and leads are unchanged. 



A right internal jugular catheter courses into the subclavian with tip 

projected over the right axillary region. Repositioning required. 



No pneumothorax. 



No sizable pleural effusion. 



Left basilar opacity most likely reflects atelectasis. 



IMPRESSION:



A right internal jugular catheter courses into the subclavian with tip 

projected over the right axillary region. Repositioning required.



Dictated by Dharmesh Rueda MD @ 12/11/2021 2:16:27 PM



(Electronically Signed)

## 2021-12-12 NOTE — PCM.PN
- General Info


Date of Service: 12/12/21





- Review of Systems


Systems Review Comment:: 





more awake today





- Patient Data


Vitals - Most Recent: 


                                Last Vital Signs











Temp  36.8 C   12/12/21 12:00


 


Pulse  83   12/09/21 21:50


 


Resp  21 H  12/12/21 13:00


 


BP  129/55 L  12/12/21 13:00


 


Pulse Ox  95   12/12/21 13:00











Weight - Most Recent: 66.5 kg


I&O - Last 24 Hours: 


                                 Intake & Output











 12/11/21 12/12/21 12/12/21





 22:59 06:59 14:59


 


Intake Total 0 140 3


 


Balance 0 140 3











Lab Results Last 24 Hours: 


                         Laboratory Results - last 24 hr











  12/11/21 12/11/21 12/11/21 Range/Units





  14:44 19:04 19:06 


 


WBC     (4.0-11.0)  K/uL


 


RBC     (4.30-5.90)  M/uL


 


Hgb     (12.0-16.0)  g/dL


 


Hct     (36.0-46.0)  %


 


MCV     (80.0-98.0)  fL


 


MCH     (27.0-32.0)  pg


 


MCHC     (31.0-37.0)  g/dL


 


RDW Std Deviation     (28.0-62.0)  fl


 


RDW Coeff of Jason     (11.0-15.0)  %


 


Plt Count     (150-400)  K/uL


 


MPV     (7.40-12.00)  fL


 


Neut % (Auto)     (48.0-80.0)  %


 


Lymph % (Auto)     (16.0-40.0)  %


 


Mono % (Auto)     (0.0-15.0)  %


 


Eos % (Auto)     (0.0-7.0)  %


 


Baso % (Auto)     (0.0-1.5)  %


 


Neut # (Auto)     (1.4-5.7)  K/uL


 


Lymph # (Auto)     (0.6-2.4)  K/uL


 


Mono # (Auto)     (0.0-0.8)  K/uL


 


Eos # (Auto)     (0.0-0.7)  K/uL


 


Baso # (Auto)     (0.0-0.1)  K/uL


 


Nucleated RBC %     /100WBC


 


Nucleated RBCs #     K/uL


 


Sodium     (136-145)  mmol/L


 


Potassium     (3.5-5.1)  mmol/L


 


Chloride     ()  mmol/L


 


Carbon Dioxide     (21.0-32.0)  mmol/L


 


BUN     (7.0-18.0)  mg/dL


 


Creatinine     (0.6-1.0)  mg/dL


 


Est Cr Clr Drug Dosing     mL/min


 


Estimated GFR (MDRD)     ml/min


 


Glucose     ()  mg/dL


 


POC Glucose  131 H  121 H   (70-99)  mg/dL


 


Calcium     (8.5-10.1)  mg/dL


 


Total Bilirubin     (0.2-1.0)  mg/dL


 


AST     (15-37)  IU/L


 


ALT     (14-63)  IU/L


 


Alkaline Phosphatase     ()  U/L


 


Total Protein     (6.4-8.2)  g/dL


 


Albumin     (3.4-5.0)  g/dL


 


Globulin     (2.6-4.0)  g/dL


 


Albumin/Globulin Ratio     (0.9-1.6)  


 


Random Vancomycin    9.2  ug/mL














  12/11/21 12/11/21 12/11/21 Range/Units





  19:06 19:06 20:09 


 


WBC  13.77 H    (4.0-11.0)  K/uL


 


RBC  3.99 L    (4.30-5.90)  M/uL


 


Hgb  11.9 L    (12.0-16.0)  g/dL


 


Hct  36.6    (36.0-46.0)  %


 


MCV  91.7    (80.0-98.0)  fL


 


MCH  29.8    (27.0-32.0)  pg


 


MCHC  32.5    (31.0-37.0)  g/dL


 


RDW Std Deviation  48.4    (28.0-62.0)  fl


 


RDW Coeff of Jason  15    (11.0-15.0)  %


 


Plt Count  178    (150-400)  K/uL


 


MPV  11.10    (7.40-12.00)  fL


 


Neut % (Auto)  86.0 H    (48.0-80.0)  %


 


Lymph % (Auto)  6.6 L    (16.0-40.0)  %


 


Mono % (Auto)  6.6    (0.0-15.0)  %


 


Eos % (Auto)  0.7    (0.0-7.0)  %


 


Baso % (Auto)  0.1    (0.0-1.5)  %


 


Neut # (Auto)  11.9 H    (1.4-5.7)  K/uL


 


Lymph # (Auto)  0.9    (0.6-2.4)  K/uL


 


Mono # (Auto)  0.9 H    (0.0-0.8)  K/uL


 


Eos # (Auto)  0.1    (0.0-0.7)  K/uL


 


Baso # (Auto)  0.0    (0.0-0.1)  K/uL


 


Nucleated RBC %  0.0    /100WBC


 


Nucleated RBCs #  0    K/uL


 


Sodium   149 H   (136-145)  mmol/L


 


Potassium   3.5   (3.5-5.1)  mmol/L


 


Chloride   113 H   ()  mmol/L


 


Carbon Dioxide   29.1   (21.0-32.0)  mmol/L


 


BUN   63 H   (7.0-18.0)  mg/dL


 


Creatinine   1.9 H   (0.6-1.0)  mg/dL


 


Est Cr Clr Drug Dosing   16.11   mL/min


 


Estimated GFR (MDRD)   25.2   ml/min


 


Glucose   127 H   ()  mg/dL


 


POC Glucose    82  (70-99)  mg/dL


 


Calcium   9.6   (8.5-10.1)  mg/dL


 


Total Bilirubin   0.8   (0.2-1.0)  mg/dL


 


AST   15   (15-37)  IU/L


 


ALT   22   (14-63)  IU/L


 


Alkaline Phosphatase   130 H   ()  U/L


 


Total Protein   6.7   (6.4-8.2)  g/dL


 


Albumin   1.8 L   (3.4-5.0)  g/dL


 


Globulin   4.9 H   (2.6-4.0)  g/dL


 


Albumin/Globulin Ratio   0.4 L   (0.9-1.6)  


 


Random Vancomycin     ug/mL














  12/11/21 12/11/21 12/12/21 Range/Units





  21:19 23:53 02:21 


 


WBC     (4.0-11.0)  K/uL


 


RBC     (4.30-5.90)  M/uL


 


Hgb     (12.0-16.0)  g/dL


 


Hct     (36.0-46.0)  %


 


MCV     (80.0-98.0)  fL


 


MCH     (27.0-32.0)  pg


 


MCHC     (31.0-37.0)  g/dL


 


RDW Std Deviation     (28.0-62.0)  fl


 


RDW Coeff of Jason     (11.0-15.0)  %


 


Plt Count     (150-400)  K/uL


 


MPV     (7.40-12.00)  fL


 


Neut % (Auto)     (48.0-80.0)  %


 


Lymph % (Auto)     (16.0-40.0)  %


 


Mono % (Auto)     (0.0-15.0)  %


 


Eos % (Auto)     (0.0-7.0)  %


 


Baso % (Auto)     (0.0-1.5)  %


 


Neut # (Auto)     (1.4-5.7)  K/uL


 


Lymph # (Auto)     (0.6-2.4)  K/uL


 


Mono # (Auto)     (0.0-0.8)  K/uL


 


Eos # (Auto)     (0.0-0.7)  K/uL


 


Baso # (Auto)     (0.0-0.1)  K/uL


 


Nucleated RBC %     /100WBC


 


Nucleated RBCs #     K/uL


 


Sodium     (136-145)  mmol/L


 


Potassium     (3.5-5.1)  mmol/L


 


Chloride     ()  mmol/L


 


Carbon Dioxide     (21.0-32.0)  mmol/L


 


BUN     (7.0-18.0)  mg/dL


 


Creatinine     (0.6-1.0)  mg/dL


 


Est Cr Clr Drug Dosing     mL/min


 


Estimated GFR (MDRD)     ml/min


 


Glucose     ()  mg/dL


 


POC Glucose  138 H  125 H  89  (70-99)  mg/dL


 


Calcium     (8.5-10.1)  mg/dL


 


Total Bilirubin     (0.2-1.0)  mg/dL


 


AST     (15-37)  IU/L


 


ALT     (14-63)  IU/L


 


Alkaline Phosphatase     ()  U/L


 


Total Protein     (6.4-8.2)  g/dL


 


Albumin     (3.4-5.0)  g/dL


 


Globulin     (2.6-4.0)  g/dL


 


Albumin/Globulin Ratio     (0.9-1.6)  


 


Random Vancomycin     ug/mL














  12/12/21 12/12/21 12/12/21 Range/Units





  04:20 06:17 07:01 


 


WBC     (4.0-11.0)  K/uL


 


RBC     (4.30-5.90)  M/uL


 


Hgb     (12.0-16.0)  g/dL


 


Hct     (36.0-46.0)  %


 


MCV     (80.0-98.0)  fL


 


MCH     (27.0-32.0)  pg


 


MCHC     (31.0-37.0)  g/dL


 


RDW Std Deviation     (28.0-62.0)  fl


 


RDW Coeff of Jason     (11.0-15.0)  %


 


Plt Count     (150-400)  K/uL


 


MPV     (7.40-12.00)  fL


 


Neut % (Auto)     (48.0-80.0)  %


 


Lymph % (Auto)     (16.0-40.0)  %


 


Mono % (Auto)     (0.0-15.0)  %


 


Eos % (Auto)     (0.0-7.0)  %


 


Baso % (Auto)     (0.0-1.5)  %


 


Neut # (Auto)     (1.4-5.7)  K/uL


 


Lymph # (Auto)     (0.6-2.4)  K/uL


 


Mono # (Auto)     (0.0-0.8)  K/uL


 


Eos # (Auto)     (0.0-0.7)  K/uL


 


Baso # (Auto)     (0.0-0.1)  K/uL


 


Nucleated RBC %     /100WBC


 


Nucleated RBCs #     K/uL


 


Sodium     (136-145)  mmol/L


 


Potassium     (3.5-5.1)  mmol/L


 


Chloride     ()  mmol/L


 


Carbon Dioxide     (21.0-32.0)  mmol/L


 


BUN     (7.0-18.0)  mg/dL


 


Creatinine     (0.6-1.0)  mg/dL


 


Est Cr Clr Drug Dosing     mL/min


 


Estimated GFR (MDRD)     ml/min


 


Glucose     ()  mg/dL


 


POC Glucose  103 H  130 H  134 H  (70-99)  mg/dL


 


Calcium     (8.5-10.1)  mg/dL


 


Total Bilirubin     (0.2-1.0)  mg/dL


 


AST     (15-37)  IU/L


 


ALT     (14-63)  IU/L


 


Alkaline Phosphatase     ()  U/L


 


Total Protein     (6.4-8.2)  g/dL


 


Albumin     (3.4-5.0)  g/dL


 


Globulin     (2.6-4.0)  g/dL


 


Albumin/Globulin Ratio     (0.9-1.6)  


 


Random Vancomycin     ug/mL














  12/12/21 12/12/21 12/12/21 Range/Units





  07:48 09:42 12:35 


 


WBC     (4.0-11.0)  K/uL


 


RBC     (4.30-5.90)  M/uL


 


Hgb     (12.0-16.0)  g/dL


 


Hct     (36.0-46.0)  %


 


MCV     (80.0-98.0)  fL


 


MCH     (27.0-32.0)  pg


 


MCHC     (31.0-37.0)  g/dL


 


RDW Std Deviation     (28.0-62.0)  fl


 


RDW Coeff of Jason     (11.0-15.0)  %


 


Plt Count     (150-400)  K/uL


 


MPV     (7.40-12.00)  fL


 


Neut % (Auto)     (48.0-80.0)  %


 


Lymph % (Auto)     (16.0-40.0)  %


 


Mono % (Auto)     (0.0-15.0)  %


 


Eos % (Auto)     (0.0-7.0)  %


 


Baso % (Auto)     (0.0-1.5)  %


 


Neut # (Auto)     (1.4-5.7)  K/uL


 


Lymph # (Auto)     (0.6-2.4)  K/uL


 


Mono # (Auto)     (0.0-0.8)  K/uL


 


Eos # (Auto)     (0.0-0.7)  K/uL


 


Baso # (Auto)     (0.0-0.1)  K/uL


 


Nucleated RBC %     /100WBC


 


Nucleated RBCs #     K/uL


 


Sodium     (136-145)  mmol/L


 


Potassium     (3.5-5.1)  mmol/L


 


Chloride     ()  mmol/L


 


Carbon Dioxide     (21.0-32.0)  mmol/L


 


BUN     (7.0-18.0)  mg/dL


 


Creatinine     (0.6-1.0)  mg/dL


 


Est Cr Clr Drug Dosing     mL/min


 


Estimated GFR (MDRD)     ml/min


 


Glucose     ()  mg/dL


 


POC Glucose  128 H  116 H  270 H  (70-99)  mg/dL


 


Calcium     (8.5-10.1)  mg/dL


 


Total Bilirubin     (0.2-1.0)  mg/dL


 


AST     (15-37)  IU/L


 


ALT     (14-63)  IU/L


 


Alkaline Phosphatase     ()  U/L


 


Total Protein     (6.4-8.2)  g/dL


 


Albumin     (3.4-5.0)  g/dL


 


Globulin     (2.6-4.0)  g/dL


 


Albumin/Globulin Ratio     (0.9-1.6)  


 


Random Vancomycin     ug/mL











[facility] Results Last 24 Hours: 


                                  Microbiology











 12/09/21 17:56 Blood Culture Identification Panel - Preliminary





 Blood    Gram Positive Cocci


 


 12/09/21 18:18 Aerobic Blood Culture - Final





 Blood - Venous - Lab Draw Anaerobic Blood Culture - Final











Med Orders - Current: 


                               Current Medications





Acetaminophen (Acetaminophen 325 Mg Tab)  650 mg PO Q6H PRN


   PRN Reason: Pain/Fever


Dextrose/Water (50% Dextrose In Water 50 Ml Syringe)  50 ml IVPUSH ASDIRECTED 

PRN


   PRN Reason: Hypoglycemia


Dextrose/Water (50% Dextrose In Water 50 Ml Syringe)  50 ml IVPUSH ASDIRECTED 

PRN


   PRN Reason: Hypoglycemia


Enoxaparin Sodium (Enoxaparin 30 Mg/0.3 Ml Syringe)  30 mg SUBCUT Q24H [facility]


   Last Admin: 12/11/21 21:34 Dose:  30 mg


   Documented by: 


Glucagon (Glucagon,Human Recombinant 1 Mg Vial)  1 mg IM ASDIRECTED PRN


   PRN Reason: Hypoglycemia


Glucagon (Glucagon,Human Recombinant 1 Mg Vial)  1 mg IM ASDIRECTED PRN


   PRN Reason: Hypoglycemia


Clindamycin Phosphate 600 mg/ (Premix)  50 mls @ 100 mls/hr IV Q6H [facility]


   Last Admin: 12/12/21 08:23 Dose:  100 mls/hr


   Documented by: 


Levofloxacin/Dextrose 750 mg/ (Premix)  150 mls @ 100 mls/hr IV Q48H [facility]


   Last Admin: 12/12/21 08:23 Dose:  100 mls/hr


   Documented by: 


Insulin Aspart (Insulin Aspart 100 Units/Ml 3 Ml Pen)  0 unit SUBCUT TIDAC [facility]; 

Protocol


   Last Admin: 12/12/21 12:39 Dose:  3 unit


   Documented by: 


Nystatin (Nystatin Topical Powder 15 Gm Bottle)  0 gm TOP BID [facility]


   Last Admin: 12/12/21 08:28 Dose:  1 applic


   Documented by: 


Oxycodone HCl (Oxycodone 5 Mg Tab)  5 mg PO Q4H PRN


   PRN Reason: Pain


   Last Admin: 12/10/21 18:25 Dose:  5 mg


   Documented by: 


Vancomycin HCl (Pharmacy To Dose - Vancomycin)  1 dose .XX ASDIRECTED [facility]





Discontinued Medications





Enoxaparin Sodium (Enoxaparin 40 Mg/0.4 Ml Syringe)  40 mg SUBCUT Q24H [facility]


   Last Admin: 12/10/21 00:27 Dose:  Not Given


   Documented by: 


Fentanyl (Fentanyl 100 Mcg/2 Ml Sdv)  25 mcg IVPUSH ONETIME ONE


   Stop: 12/10/21 05:58


   Last Admin: 12/10/21 06:04 Dose:  25 mcg


   Documented by: 


Vancomycin HCl 1.25 gm/ Premix  250 mls @ 166.667 mls/hr IV ONETIME ONE


   Stop: 12/09/21 19:14


   Last Admin: 12/09/21 18:51 Dose:  166.667 mls/hr


   Documented by: 


Sodium Chloride (Normal Saline)  1,000 mls @ 1,000 mls/hr IV .Bolus ONE


   Stop: 12/09/21 19:25


   Last Admin: 12/09/21 18:51 Dose:  1,000 mls/hr


   Documented by: 


Clindamycin Phosphate 600 mg/ (Sodium Chloride)  54 mls @ 100 mls/hr IV ONETIME 

ONE


   Stop: 12/09/21 20:09


   Last Admin: 12/09/21 21:30 Dose:  Not Given


   Documented by: 


Lactated Ringer's (Ringers, Lactated)  1,000 mls @ 999 mls/hr IV .BOLUS ONE


   Stop: 12/09/21 20:59


   Last Admin: 12/09/21 20:38 Dose:  999 mls/hr


   Documented by: 


Insulin Regular in 0.9 % NACL (Myxredlin In Ns 100 Unit/100 Ml)  100 mls @ 4 

mls/hr IV TITRATE [facility]; Protocol


   Last Titration: 12/12/21 11:10 Dose:  0 mls/hr, 0 mls/hr


   Documented by: 


Piperacillin Sod/Tazobactam (Sod 3.375 gm/ Sodium Chloride)  50 mls @ 100 mls/hr

IV Q6H [facility]


Vancomycin HCl 1 gm/ Sodium (Chloride)  250 mls @ 166.667 mls/hr IV BEDTIME [facility]


   Stop: 12/11/21 22:29


   Last Admin: 12/11/21 20:59 Dose:  166.667 mls/hr


   Documented by: 


Insulin Human Regular (Insulin Regular, Human 100 Units/Ml 10 Ml Vial)  10 unit 

IVPUSH ONETIME ONE; Protocol


   Stop: 12/09/21 19:39


   Last Admin: 12/09/21 20:34 Dose:  10 unit


   Documented by: 


Midazolam HCl (Midazolam 1 Mg/Ml 2 Ml Sdv)  0.5 mg IVPUSH ONETIME ONE


   Stop: 12/11/21 12:39


   Last Admin: 12/11/21 14:25 Dose:  0.5 mg


   Documented by: 


Midazolam HCl (Midazolam 1 Mg/Ml 2 Ml Sdv) Confirm Administered Dose 2 mg .ROUTE

.STK-MED ONE


   Stop: 12/11/21 12:41


   Last Admin: 12/11/21 17:39 Dose:  Not Given


   Documented by: 


Midazolam HCl (Midazolam 1 Mg/Ml 2 Ml Sdv)  0.5 mg IVPUSH ONETIME ONE


   Stop: 12/11/21 15:29


   Last Admin: 12/11/21 17:37 Dose:  0.5 mg


   Documented by: 


Midazolam HCl (Midazolam 1 Mg/Ml 2 Ml Sdv) Confirm Administered Dose 2 mg .ROUTE

.STK-MED ONE


   Stop: 12/11/21 15:35


   Last Admin: 12/11/21 17:39 Dose:  Not Given


   Documented by: 


Vancomycin HCl (Pharmacy To Dose - Vancomycin)  1 dose .XX ONETIME ONE


   Stop: 12/09/21 17:28


   Last Admin: 12/09/21 17:46 Dose:  Not Given


   Documented by: 











- Exam


Central Line Total Time: 0Days  20Hours


General: Alert, Oriented


Neck: Supple


Lungs: Normal Respiratory Effort, Rhonchi


Cardiovascular: Regular Rate, Regular Rhythm


GI/Abdominal Exam: Soft, Non-Tender


Extremities: Other (erythema of right leg improving)





- Patient Data


Lab Results Last 24 hrs: 


                         Laboratory Results - last 24 hr











  12/11/21 12/11/21 12/11/21 Range/Units





  14:44 19:04 19:06 


 


WBC     (4.0-11.0)  K/uL


 


RBC     (4.30-5.90)  M/uL


 


Hgb     (12.0-16.0)  g/dL


 


Hct     (36.0-46.0)  %


 


MCV     (80.0-98.0)  fL


 


MCH     (27.0-32.0)  pg


 


MCHC     (31.0-37.0)  g/dL


 


RDW Std Deviation     (28.0-62.0)  fl


 


RDW Coeff of Jason     (11.0-15.0)  %


 


Plt Count     (150-400)  K/uL


 


MPV     (7.40-12.00)  fL


 


Neut % (Auto)     (48.0-80.0)  %


 


Lymph % (Auto)     (16.0-40.0)  %


 


Mono % (Auto)     (0.0-15.0)  %


 


Eos % (Auto)     (0.0-7.0)  %


 


Baso % (Auto)     (0.0-1.5)  %


 


Neut # (Auto)     (1.4-5.7)  K/uL


 


Lymph # (Auto)     (0.6-2.4)  K/uL


 


Mono # (Auto)     (0.0-0.8)  K/uL


 


Eos # (Auto)     (0.0-0.7)  K/uL


 


Baso # (Auto)     (0.0-0.1)  K/uL


 


Nucleated RBC %     /100WBC


 


Nucleated RBCs #     K/uL


 


Sodium     (136-145)  mmol/L


 


Potassium     (3.5-5.1)  mmol/L


 


Chloride     ()  mmol/L


 


Carbon Dioxide     (21.0-32.0)  mmol/L


 


BUN     (7.0-18.0)  mg/dL


 


Creatinine     (0.6-1.0)  mg/dL


 


Est Cr Clr Drug Dosing     mL/min


 


Estimated GFR (MDRD)     ml/min


 


Glucose     ()  mg/dL


 


POC Glucose  131 H  121 H   (70-99)  mg/dL


 


Calcium     (8.5-10.1)  mg/dL


 


Total Bilirubin     (0.2-1.0)  mg/dL


 


AST     (15-37)  IU/L


 


ALT     (14-63)  IU/L


 


Alkaline Phosphatase     ()  U/L


 


Total Protein     (6.4-8.2)  g/dL


 


Albumin     (3.4-5.0)  g/dL


 


Globulin     (2.6-4.0)  g/dL


 


Albumin/Globulin Ratio     (0.9-1.6)  


 


Random Vancomycin    9.2  ug/mL














  12/11/21 12/11/21 12/11/21 Range/Units





  19:06 19:06 20:09 


 


WBC  13.77 H    (4.0-11.0)  K/uL


 


RBC  3.99 L    (4.30-5.90)  M/uL


 


Hgb  11.9 L    (12.0-16.0)  g/dL


 


Hct  36.6    (36.0-46.0)  %


 


MCV  91.7    (80.0-98.0)  fL


 


MCH  29.8    (27.0-32.0)  pg


 


MCHC  32.5    (31.0-37.0)  g/dL


 


RDW Std Deviation  48.4    (28.0-62.0)  fl


 


RDW Coeff of Jason  15    (11.0-15.0)  %


 


Plt Count  178    (150-400)  K/uL


 


MPV  11.10    (7.40-12.00)  fL


 


Neut % (Auto)  86.0 H    (48.0-80.0)  %


 


Lymph % (Auto)  6.6 L    (16.0-40.0)  %


 


Mono % (Auto)  6.6    (0.0-15.0)  %


 


Eos % (Auto)  0.7    (0.0-7.0)  %


 


Baso % (Auto)  0.1    (0.0-1.5)  %


 


Neut # (Auto)  11.9 H    (1.4-5.7)  K/uL


 


Lymph # (Auto)  0.9    (0.6-2.4)  K/uL


 


Mono # (Auto)  0.9 H    (0.0-0.8)  K/uL


 


Eos # (Auto)  0.1    (0.0-0.7)  K/uL


 


Baso # (Auto)  0.0    (0.0-0.1)  K/uL


 


Nucleated RBC %  0.0    /100WBC


 


Nucleated RBCs #  0    K/uL


 


Sodium   149 H   (136-145)  mmol/L


 


Potassium   3.5   (3.5-5.1)  mmol/L


 


Chloride   113 H   ()  mmol/L


 


Carbon Dioxide   29.1   (21.0-32.0)  mmol/L


 


BUN   63 H   (7.0-18.0)  mg/dL


 


Creatinine   1.9 H   (0.6-1.0)  mg/dL


 


Est Cr Clr Drug Dosing   16.11   mL/min


 


Estimated GFR (MDRD)   25.2   ml/min


 


Glucose   127 H   ()  mg/dL


 


POC Glucose    82  (70-99)  mg/dL


 


Calcium   9.6   (8.5-10.1)  mg/dL


 


Total Bilirubin   0.8   (0.2-1.0)  mg/dL


 


AST   15   (15-37)  IU/L


 


ALT   22   (14-63)  IU/L


 


Alkaline Phosphatase   130 H   ()  U/L


 


Total Protein   6.7   (6.4-8.2)  g/dL


 


Albumin   1.8 L   (3.4-5.0)  g/dL


 


Globulin   4.9 H   (2.6-4.0)  g/dL


 


Albumin/Globulin Ratio   0.4 L   (0.9-1.6)  


 


Random Vancomycin     ug/mL














  12/11/21 12/11/21 12/12/21 Range/Units





  21:19 23:53 02:21 


 


WBC     (4.0-11.0)  K/uL


 


RBC     (4.30-5.90)  M/uL


 


Hgb     (12.0-16.0)  g/dL


 


Hct     (36.0-46.0)  %


 


MCV     (80.0-98.0)  fL


 


MCH     (27.0-32.0)  pg


 


MCHC     (31.0-37.0)  g/dL


 


RDW Std Deviation     (28.0-62.0)  fl


 


RDW Coeff of Jason     (11.0-15.0)  %


 


Plt Count     (150-400)  K/uL


 


MPV     (7.40-12.00)  fL


 


Neut % (Auto)     (48.0-80.0)  %


 


Lymph % (Auto)     (16.0-40.0)  %


 


Mono % (Auto)     (0.0-15.0)  %


 


Eos % (Auto)     (0.0-7.0)  %


 


Baso % (Auto)     (0.0-1.5)  %


 


Neut # (Auto)     (1.4-5.7)  K/uL


 


Lymph # (Auto)     (0.6-2.4)  K/uL


 


Mono # (Auto)     (0.0-0.8)  K/uL


 


Eos # (Auto)     (0.0-0.7)  K/uL


 


Baso # (Auto)     (0.0-0.1)  K/uL


 


Nucleated RBC %     /100WBC


 


Nucleated RBCs #     K/uL


 


Sodium     (136-145)  mmol/L


 


Potassium     (3.5-5.1)  mmol/L


 


Chloride     ()  mmol/L


 


Carbon Dioxide     (21.0-32.0)  mmol/L


 


BUN     (7.0-18.0)  mg/dL


 


Creatinine     (0.6-1.0)  mg/dL


 


Est Cr Clr Drug Dosing     mL/min


 


Estimated GFR (MDRD)     ml/min


 


Glucose     ()  mg/dL


 


POC Glucose  138 H  125 H  89  (70-99)  mg/dL


 


Calcium     (8.5-10.1)  mg/dL


 


Total Bilirubin     (0.2-1.0)  mg/dL


 


AST     (15-37)  IU/L


 


ALT     (14-63)  IU/L


 


Alkaline Phosphatase     ()  U/L


 


Total Protein     (6.4-8.2)  g/dL


 


Albumin     (3.4-5.0)  g/dL


 


Globulin     (2.6-4.0)  g/dL


 


Albumin/Globulin Ratio     (0.9-1.6)  


 


Random Vancomycin     ug/mL














  12/12/21 12/12/21 12/12/21 Range/Units





  04:20 06:17 07:01 


 


WBC     (4.0-11.0)  K/uL


 


RBC     (4.30-5.90)  M/uL


 


Hgb     (12.0-16.0)  g/dL


 


Hct     (36.0-46.0)  %


 


MCV     (80.0-98.0)  fL


 


MCH     (27.0-32.0)  pg


 


MCHC     (31.0-37.0)  g/dL


 


RDW Std Deviation     (28.0-62.0)  fl


 


RDW Coeff of Jason     (11.0-15.0)  %


 


Plt Count     (150-400)  K/uL


 


MPV     (7.40-12.00)  fL


 


Neut % (Auto)     (48.0-80.0)  %


 


Lymph % (Auto)     (16.0-40.0)  %


 


Mono % (Auto)     (0.0-15.0)  %


 


Eos % (Auto)     (0.0-7.0)  %


 


Baso % (Auto)     (0.0-1.5)  %


 


Neut # (Auto)     (1.4-5.7)  K/uL


 


Lymph # (Auto)     (0.6-2.4)  K/uL


 


Mono # (Auto)     (0.0-0.8)  K/uL


 


Eos # (Auto)     (0.0-0.7)  K/uL


 


Baso # (Auto)     (0.0-0.1)  K/uL


 


Nucleated RBC %     /100WBC


 


Nucleated RBCs #     K/uL


 


Sodium     (136-145)  mmol/L


 


Potassium     (3.5-5.1)  mmol/L


 


Chloride     ()  mmol/L


 


Carbon Dioxide     (21.0-32.0)  mmol/L


 


BUN     (7.0-18.0)  mg/dL


 


Creatinine     (0.6-1.0)  mg/dL


 


Est Cr Clr Drug Dosing     mL/min


 


Estimated GFR (MDRD)     ml/min


 


Glucose     ()  mg/dL


 


POC Glucose  103 H  130 H  134 H  (70-99)  mg/dL


 


Calcium     (8.5-10.1)  mg/dL


 


Total Bilirubin     (0.2-1.0)  mg/dL


 


AST     (15-37)  IU/L


 


ALT     (14-63)  IU/L


 


Alkaline Phosphatase     ()  U/L


 


Total Protein     (6.4-8.2)  g/dL


 


Albumin     (3.4-5.0)  g/dL


 


Globulin     (2.6-4.0)  g/dL


 


Albumin/Globulin Ratio     (0.9-1.6)  


 


Random Vancomycin     ug/mL














  12/12/21 12/12/21 12/12/21 Range/Units





  07:48 09:42 12:35 


 


WBC     (4.0-11.0)  K/uL


 


RBC     (4.30-5.90)  M/uL


 


Hgb     (12.0-16.0)  g/dL


 


Hct     (36.0-46.0)  %


 


MCV     (80.0-98.0)  fL


 


MCH     (27.0-32.0)  pg


 


MCHC     (31.0-37.0)  g/dL


 


RDW Std Deviation     (28.0-62.0)  fl


 


RDW Coeff of Jason     (11.0-15.0)  %


 


Plt Count     (150-400)  K/uL


 


MPV     (7.40-12.00)  fL


 


Neut % (Auto)     (48.0-80.0)  %


 


Lymph % (Auto)     (16.0-40.0)  %


 


Mono % (Auto)     (0.0-15.0)  %


 


Eos % (Auto)     (0.0-7.0)  %


 


Baso % (Auto)     (0.0-1.5)  %


 


Neut # (Auto)     (1.4-5.7)  K/uL


 


Lymph # (Auto)     (0.6-2.4)  K/uL


 


Mono # (Auto)     (0.0-0.8)  K/uL


 


Eos # (Auto)     (0.0-0.7)  K/uL


 


Baso # (Auto)     (0.0-0.1)  K/uL


 


Nucleated RBC %     /100WBC


 


Nucleated RBCs #     K/uL


 


Sodium     (136-145)  mmol/L


 


Potassium     (3.5-5.1)  mmol/L


 


Chloride     ()  mmol/L


 


Carbon Dioxide     (21.0-32.0)  mmol/L


 


BUN     (7.0-18.0)  mg/dL


 


Creatinine     (0.6-1.0)  mg/dL


 


Est Cr Clr Drug Dosing     mL/min


 


Estimated GFR (MDRD)     ml/min


 


Glucose     ()  mg/dL


 


POC Glucose  128 H  116 H  270 H  (70-99)  mg/dL


 


Calcium     (8.5-10.1)  mg/dL


 


Total Bilirubin     (0.2-1.0)  mg/dL


 


AST     (15-37)  IU/L


 


ALT     (14-63)  IU/L


 


Alkaline Phosphatase     ()  U/L


 


Total Protein     (6.4-8.2)  g/dL


 


Albumin     (3.4-5.0)  g/dL


 


Globulin     (2.6-4.0)  g/dL


 


Albumin/Globulin Ratio     (0.9-1.6)  


 


Random Vancomycin     ug/mL











Result Diagrams: 


                                 12/11/21 19:06





                                 12/11/21 19:06


[facility] Results Last 24 hrs: 


                                  Microbiology











 12/09/21 17:56 Blood Culture Identification Panel - Preliminary





 Blood    Gram Positive Cocci


 


 12/09/21 18:18 Aerobic Blood Culture - Final





 Blood - Venous - Lab Draw Anaerobic Blood Culture - Final














Sepsis Event Note





- Evaluation


Sepsis Screening Result: No Definite Risk





- Focused Exam


Vital Signs: 


                                   Vital Signs











  Temp Temp Resp BP Pulse Ox


 


 12/12/21 13:00    21 H  129/55 L  95


 


 12/12/21 12:00  36.8 C   22 H  131/48 L  90 L


 


 12/12/21 11:00    17  123/51 L  94 L


 


 12/12/21 10:00    25 H  139/49 L  94 L


 


 12/12/21 09:00    19  135/54 L  92 L


 


 12/12/21 08:00  35.8 C L   24 H  136/58 L  95


 


 12/12/21 07:00    22 H  138/52 L  93 L


 


 12/12/21 06:00    20  135/59 L  94 L


 


 12/12/21 05:00    16  135/57 L  92 L


 


 12/12/21 04:00   36.3 C  17  134/70  94 L


 


 12/12/21 03:00    18  122/70  97


 


 12/12/21 02:00    11 L  139/62  94 L














- Problem List & Annotations


(1) Hyperglycemia


SNOMED Code(s): 13496961


   Code(s): R73.9 - HYPERGLYCEMIA, UNSPECIFIED   Status: Acute   Current Visit: 

Yes   





(2) Sepsis


SNOMED Code(s): 17034384


   Code(s): A41.9 - SEPSIS, UNSPECIFIED ORGANISM   Status: Acute   Current 

Visit: Yes   





(3) Acute on chronic renal insufficiency


SNOMED Code(s): 532972860


   Code(s): N28.9 - DISORDER OF KIDNEY AND URETER, UNSPECIFIED; N18.9 - CHRONIC 

KIDNEY DISEASE, UNSPECIFIED   Status: Acute   Current Visit: Yes   





(4) Cellulitis


SNOMED Code(s): 113577667


   Code(s): L03.90 - CELLULITIS, UNSPECIFIED   Status: Acute   Current Visit: 

Yes   





- Problem List Review


Problem List Initiated/Reviewed/Updated: Yes





- My Orders


Last 24 Hours: 


My Active Orders





12/12/21 10:44


Dextrose 50% in Water   50 ml IVPUSH ASDIRECTED PRN 


Glucagon,Human Recombinant [GlucaGen]   1 mg IM ASDIRECTED PRN 





12/12/21 10:48


Transfer Patient (Change bed) [ADT] Routine 





12/12/21 11:30


Insulin Aspart [NovoLOG]   See Protocol  SUBCUT TIDAC 





12/12/21 13:40


BMP [BASIC METABOLIC PANEL,BMP] [CHEM] Routine 


CBC WITH AUTO DIFF [HEME] Routine 





12/12/21 14:00


Telemetry Monitoring [Cardiac Monitoring] [RC] Q8H 





12/12/21 20:00


VANCOMYCIN RANDOM [CHEM] Routine 














- Plan


Plan:: 





82 yo female admitted for sepsis from lower extremity cellulitis


Sepsis: has received 3 L NS on admission, lactic acid normalized,  2/2 blood 

cultures growing gram positive cocci


Cellulitis: continue vancomycin, clindamycin, Doppler ordered


Hyperglycemia: will switch to ssi


unable to get IJ or subclavian line so has femoral line in place

## 2021-12-13 NOTE — PCM.PN
- General Info


Date of Service: 12/13/21





- Review of Systems


Systems Review Comment:: 


nonverbal





- Patient Data


Vitals - Most Recent: 


                                Last Vital Signs











Temp  36.6 C   12/13/21 08:00


 


Pulse  102 H  12/13/21 08:00


 


Resp  16   12/13/21 08:00


 


BP  128/58 L  12/13/21 08:00


 


Pulse Ox  92 L  12/13/21 08:00











Weight - Most Recent: 70 kg


I&O - Last 24 Hours: 


                                 Intake & Output











 12/12/21 12/13/21 12/13/21





 22:59 06:59 14:59


 


Intake Total 390 810 


 


Output Total  375 


 


Balance 390 435 











Lab Results Last 24 Hours: 


                         Laboratory Results - last 24 hr











  12/12/21 12/12/21 12/12/21 Range/Units





  12:35 14:05 14:05 


 


WBC   9.33   (4.0-11.0)  K/uL


 


RBC   3.67 L   (4.30-5.90)  M/uL


 


Hgb   11.0 L   (12.0-16.0)  g/dL


 


Hct   33.5 L   (36.0-46.0)  %


 


MCV   91.3   (80.0-98.0)  fL


 


MCH   30.0   (27.0-32.0)  pg


 


MCHC   32.8   (31.0-37.0)  g/dL


 


RDW Std Deviation   48.9   (28.0-62.0)  fl


 


RDW Coeff of Jason   15   (11.0-15.0)  %


 


Plt Count   173   (150-400)  K/uL


 


MPV   11.80   (7.40-12.00)  fL


 


Neut % (Auto)   82.4 H   (48.0-80.0)  %


 


Lymph % (Auto)   7.9 L   (16.0-40.0)  %


 


Mono % (Auto)   8.7   (0.0-15.0)  %


 


Eos % (Auto)   0.9   (0.0-7.0)  %


 


Baso % (Auto)   0.1   (0.0-1.5)  %


 


Neut # (Auto)   7.7 H   (1.4-5.7)  K/uL


 


Lymph # (Auto)   0.7   (0.6-2.4)  K/uL


 


Mono # (Auto)   0.8   (0.0-0.8)  K/uL


 


Eos # (Auto)   0.1   (0.0-0.7)  K/uL


 


Baso # (Auto)   0.0   (0.0-0.1)  K/uL


 


Nucleated RBC %   0.0   /100WBC


 


Nucleated RBCs #   0   K/uL


 


Sodium    151 H  (136-145)  mmol/L


 


Potassium    3.5  (3.5-5.1)  mmol/L


 


Chloride    113 H  ()  mmol/L


 


Carbon Dioxide    28.3  (21.0-32.0)  mmol/L


 


BUN    62 H  (7.0-18.0)  mg/dL


 


Creatinine    1.9 H  (0.6-1.0)  mg/dL


 


Est Cr Clr Drug Dosing    16.11  mL/min


 


Estimated GFR (MDRD)    25.2  ml/min


 


Glucose    333 H  ()  mg/dL


 


POC Glucose  270 H    (70-99)  mg/dL


 


Calcium    8.7  (8.5-10.1)  mg/dL


 


Total Bilirubin     (0.2-1.0)  mg/dL


 


AST     (15-37)  IU/L


 


ALT     (14-63)  IU/L


 


Alkaline Phosphatase     ()  U/L


 


Total Protein     (6.4-8.2)  g/dL


 


Albumin     (3.4-5.0)  g/dL


 


Globulin     (2.6-4.0)  g/dL


 


Albumin/Globulin Ratio     (0.9-1.6)  


 


Random Vancomycin     ug/mL














  12/12/21 12/12/21 12/12/21 Range/Units





  17:06 20:10 20:35 


 


WBC     (4.0-11.0)  K/uL


 


RBC     (4.30-5.90)  M/uL


 


Hgb     (12.0-16.0)  g/dL


 


Hct     (36.0-46.0)  %


 


MCV     (80.0-98.0)  fL


 


MCH     (27.0-32.0)  pg


 


MCHC     (31.0-37.0)  g/dL


 


RDW Std Deviation     (28.0-62.0)  fl


 


RDW Coeff of Jason     (11.0-15.0)  %


 


Plt Count     (150-400)  K/uL


 


MPV     (7.40-12.00)  fL


 


Neut % (Auto)     (48.0-80.0)  %


 


Lymph % (Auto)     (16.0-40.0)  %


 


Mono % (Auto)     (0.0-15.0)  %


 


Eos % (Auto)     (0.0-7.0)  %


 


Baso % (Auto)     (0.0-1.5)  %


 


Neut # (Auto)     (1.4-5.7)  K/uL


 


Lymph # (Auto)     (0.6-2.4)  K/uL


 


Mono # (Auto)     (0.0-0.8)  K/uL


 


Eos # (Auto)     (0.0-0.7)  K/uL


 


Baso # (Auto)     (0.0-0.1)  K/uL


 


Nucleated RBC %     /100WBC


 


Nucleated RBCs #     K/uL


 


Sodium     (136-145)  mmol/L


 


Potassium     (3.5-5.1)  mmol/L


 


Chloride     ()  mmol/L


 


Carbon Dioxide     (21.0-32.0)  mmol/L


 


BUN     (7.0-18.0)  mg/dL


 


Creatinine     (0.6-1.0)  mg/dL


 


Est Cr Clr Drug Dosing     mL/min


 


Estimated GFR (MDRD)     ml/min


 


Glucose     ()  mg/dL


 


POC Glucose  145 H   297 H  (70-99)  mg/dL


 


Calcium     (8.5-10.1)  mg/dL


 


Total Bilirubin     (0.2-1.0)  mg/dL


 


AST     (15-37)  IU/L


 


ALT     (14-63)  IU/L


 


Alkaline Phosphatase     ()  U/L


 


Total Protein     (6.4-8.2)  g/dL


 


Albumin     (3.4-5.0)  g/dL


 


Globulin     (2.6-4.0)  g/dL


 


Albumin/Globulin Ratio     (0.9-1.6)  


 


Random Vancomycin   22.1   ug/mL














  12/13/21 12/13/21 12/13/21 Range/Units





  06:56 09:53 09:53 


 


WBC   10.06   (4.0-11.0)  K/uL


 


RBC   3.54 L   (4.30-5.90)  M/uL


 


Hgb   10.5 L   (12.0-16.0)  g/dL


 


Hct   32.1 L   (36.0-46.0)  %


 


MCV   90.7   (80.0-98.0)  fL


 


MCH   29.7   (27.0-32.0)  pg


 


MCHC   32.7   (31.0-37.0)  g/dL


 


RDW Std Deviation   49.2   (28.0-62.0)  fl


 


RDW Coeff of Jason   15   (11.0-15.0)  %


 


Plt Count   159   (150-400)  K/uL


 


MPV   11.80   (7.40-12.00)  fL


 


Neut % (Auto)   73.7   (48.0-80.0)  %


 


Lymph % (Auto)   15.3 L   (16.0-40.0)  %


 


Mono % (Auto)   9.9   (0.0-15.0)  %


 


Eos % (Auto)   0.9   (0.0-7.0)  %


 


Baso % (Auto)   0.2   (0.0-1.5)  %


 


Neut # (Auto)   7.4 H   (1.4-5.7)  K/uL


 


Lymph # (Auto)   1.5   (0.6-2.4)  K/uL


 


Mono # (Auto)   1.0 H   (0.0-0.8)  K/uL


 


Eos # (Auto)   0.1   (0.0-0.7)  K/uL


 


Baso # (Auto)   0.0   (0.0-0.1)  K/uL


 


Nucleated RBC %   0.0   /100WBC


 


Nucleated RBCs #   0   K/uL


 


Sodium    151 H  (136-145)  mmol/L


 


Potassium    3.3 L  (3.5-5.1)  mmol/L


 


Chloride    113 H  ()  mmol/L


 


Carbon Dioxide    25.5  (21.0-32.0)  mmol/L


 


BUN    57 H  (7.0-18.0)  mg/dL


 


Creatinine    1.9 H  (0.6-1.0)  mg/dL


 


Est Cr Clr Drug Dosing    16.11  mL/min


 


Estimated GFR (MDRD)    25.2  ml/min


 


Glucose    217 H  ()  mg/dL


 


POC Glucose  229 H    (70-99)  mg/dL


 


Calcium    8.6  (8.5-10.1)  mg/dL


 


Total Bilirubin    1.2 H  (0.2-1.0)  mg/dL


 


AST    49 H  (15-37)  IU/L


 


ALT    41  (14-63)  IU/L


 


Alkaline Phosphatase    150 H  ()  U/L


 


Total Protein    5.2 L  (6.4-8.2)  g/dL


 


Albumin    1.6 L  (3.4-5.0)  g/dL


 


Globulin    3.6  (2.6-4.0)  g/dL


 


Albumin/Globulin Ratio    0.4 L  (0.9-1.6)  


 


Random Vancomycin     ug/mL











[facility] Results Last 24 Hours: 


                                  Microbiology











 12/09/21 17:56 Blood Culture Identification Panel - Preliminary





 Blood    Staphylococcus Aureus


 


 12/09/21 18:18 Blood Culture Identification Panel - Preliminary





 Blood 


 


 12/12/21 17:05 Stool Occult Blood ([facility]) - Final





 Stool / Feces 











Med Orders - Current: 


                               Current Medications





Acetaminophen (Acetaminophen 325 Mg Tab)  650 mg PO Q6H PRN


   PRN Reason: Pain/Fever


Dextrose/Water (50% Dextrose In Water 50 Ml Syringe)  50 ml IVPUSH ASDIRECTED 

PRN


   PRN Reason: Hypoglycemia


Dextrose/Water (50% Dextrose In Water 50 Ml Syringe)  50 ml IVPUSH ASDIRECTED 

PRN


   PRN Reason: Hypoglycemia


Enoxaparin Sodium (Enoxaparin 30 Mg/0.3 Ml Syringe)  30 mg SUBCUT Q24H [facility]


   Last Admin: 12/12/21 21:39 Dose:  30 mg


   Documented by: 


Glucagon (Glucagon,Human Recombinant 1 Mg Vial)  1 mg IM ASDIRECTED PRN


   PRN Reason: Hypoglycemia


Glucagon (Glucagon,Human Recombinant 1 Mg Vial)  1 mg IM ASDIRECTED PRN


   PRN Reason: Hypoglycemia


Clindamycin Phosphate 600 mg/ (Premix)  50 mls @ 100 mls/hr IV Q6H [facility]


   Last Admin: 12/13/21 10:21 Dose:  100 mls/hr


   Documented by: 


Levofloxacin/Dextrose 750 mg/ (Premix)  150 mls @ 100 mls/hr IV Q48H [facility]


   Last Admin: 12/12/21 08:23 Dose:  100 mls/hr


   Documented by: 


Sodium Chloride (Sodium Chloride 0.45%)  1,000 mls @ 100 mls/hr IV ASDIRECTED 

[facility]


   Stop: 12/13/21 22:14


Insulin Aspart (Insulin Aspart 100 Units/Ml 3 Ml Pen)  0 unit SUBCUT QIDACANDBED

[facility]; Protocol


   Last Admin: 12/13/21 08:41 Dose:  2 unit


   Documented by: 


Nystatin (Nystatin Topical Powder 15 Gm Bottle)  0 gm TOP BID [facility]


   Last Admin: 12/13/21 08:44 Dose:  1 applic


   Documented by: 


Oxycodone HCl (Oxycodone 5 Mg Tab)  5 mg PO Q4H PRN


   PRN Reason: Pain


   Last Admin: 12/10/21 18:25 Dose:  5 mg


   Documented by: 


Vancomycin HCl (Pharmacy To Dose - Vancomycin)  1 dose .XX ASDIRECTED [facility]





Discontinued Medications





Enoxaparin Sodium (Enoxaparin 40 Mg/0.4 Ml Syringe)  40 mg SUBCUT Q24H [facility]


   Last Admin: 12/10/21 00:27 Dose:  Not Given


   Documented by: 


Fentanyl (Fentanyl 100 Mcg/2 Ml Sdv)  25 mcg IVPUSH ONETIME ONE


   Stop: 12/10/21 05:58


   Last Admin: 12/10/21 06:04 Dose:  25 mcg


   Documented by: 


Vancomycin HCl 1.25 gm/ Premix  250 mls @ 166.667 mls/hr IV ONETIME ONE


   Stop: 12/09/21 19:14


   Last Admin: 12/09/21 18:51 Dose:  166.667 mls/hr


   Documented by: 


Sodium Chloride (Normal Saline)  1,000 mls @ 1,000 mls/hr IV .Bolus ONE


   Stop: 12/09/21 19:25


   Last Admin: 12/09/21 18:51 Dose:  1,000 mls/hr


   Documented by: 


Clindamycin Phosphate 600 mg/ (Sodium Chloride)  54 mls @ 100 mls/hr IV ONETIME 

ONE


   Stop: 12/09/21 20:09


   Last Admin: 12/09/21 21:30 Dose:  Not Given


   Documented by: 


Lactated Ringer's (Ringers, Lactated)  1,000 mls @ 999 mls/hr IV .BOLUS ONE


   Stop: 12/09/21 20:59


   Last Admin: 12/09/21 20:38 Dose:  999 mls/hr


   Documented by: 


Insulin Regular in 0.9 % NACL (Myxredlin In Ns 100 Unit/100 Ml)  100 mls @ 4 

mls/hr IV TITRATE [facility]; Protocol


   Last Titration: 12/12/21 11:10 Dose:  0 mls/hr, 0 mls/hr


   Documented by: 


Piperacillin Sod/Tazobactam (Sod 3.375 gm/ Sodium Chloride)  50 mls @ 100 mls/hr

IV Q6H [facility]


Vancomycin HCl 1 gm/ Sodium (Chloride)  250 mls @ 166.667 mls/hr IV BEDTIME [facility]


   Stop: 12/11/21 22:29


   Last Admin: 12/11/21 20:59 Dose:  166.667 mls/hr


   Documented by: 


Vancomycin HCl 1 gm/ Sodium (Chloride)  250 mls @ 166.667 mls/hr IV DAILY [facility]


   Stop: 12/13/21 10:29


   Last Admin: 12/13/21 08:40 Dose:  166.667 mls/hr


   Documented by: 


Sodium Chloride (Sodium Chloride 0.45%)  1,000 mls @ 100 mls/hr IV ONETIME ONE


   Stop: 12/13/21 10:38


   Last Admin: 12/13/21 00:53 Dose:  100 mls/hr


   Documented by: 


Insulin Aspart (Insulin Aspart 100 Units/Ml 3 Ml Pen)  0 unit SUBCUT TIDAC [facility]; 

Protocol


   Last Admin: 12/12/21 17:13 Dose:  Not Given


   Documented by: 


Insulin Human Regular (Insulin Regular, Human 100 Units/Ml 10 Ml Vial)  10 unit 

IVPUSH ONETIME ONE; Protocol


   Stop: 12/09/21 19:39


   Last Admin: 12/09/21 20:34 Dose:  10 unit


   Documented by: 


Midazolam HCl (Midazolam 1 Mg/Ml 2 Ml Sdv)  0.5 mg IVPUSH ONETIME ONE


   Stop: 12/11/21 12:39


   Last Admin: 12/11/21 14:25 Dose:  0.5 mg


   Documented by: 


Midazolam HCl (Midazolam 1 Mg/Ml 2 Ml Sdv) Confirm Administered Dose 2 mg .ROUTE

.STK-MED ONE


   Stop: 12/11/21 12:41


   Last Admin: 12/11/21 17:39 Dose:  Not Given


   Documented by: 


Midazolam HCl (Midazolam 1 Mg/Ml 2 Ml Sdv)  0.5 mg IVPUSH ONETIME ONE


   Stop: 12/11/21 15:29


   Last Admin: 12/11/21 17:37 Dose:  0.5 mg


   Documented by: 


Midazolam HCl (Midazolam 1 Mg/Ml 2 Ml Sdv) Confirm Administered Dose 2 mg .ROUTE

.STK-MED ONE


   Stop: 12/11/21 15:35


   Last Admin: 12/11/21 17:39 Dose:  Not Given


   Documented by: 


Vancomycin HCl (Pharmacy To Dose - Vancomycin)  1 dose .XX ONETIME ONE


   Stop: 12/09/21 17:28


   Last Admin: 12/09/21 17:46 Dose:  Not Given


   Documented by: 











- Exam


Central Line Total Time: 1Days  3Hours


General: Cooperative, No Acute Distress


Lungs: Clear to Auscultation, Normal Respiratory Effort


Cardiovascular: Regular Rate, Regular Rhythm


GI/Abdominal Exam: Soft, Non-Tender, No Distention


Extremities: Non-Tender, No Pedal Edema, Other (erythema of right leg improving,

no effusions)


Skin: Warm, Dry, Intact


Neurological: No New Focal Deficit





- Patient Data


Lab Results Last 24 hrs: 


                         Laboratory Results - last 24 hr











  12/12/21 12/12/21 12/12/21 Range/Units





  12:35 14:05 14:05 


 


WBC   9.33   (4.0-11.0)  K/uL


 


RBC   3.67 L   (4.30-5.90)  M/uL


 


Hgb   11.0 L   (12.0-16.0)  g/dL


 


Hct   33.5 L   (36.0-46.0)  %


 


MCV   91.3   (80.0-98.0)  fL


 


MCH   30.0   (27.0-32.0)  pg


 


MCHC   32.8   (31.0-37.0)  g/dL


 


RDW Std Deviation   48.9   (28.0-62.0)  fl


 


RDW Coeff of Jason   15   (11.0-15.0)  %


 


Plt Count   173   (150-400)  K/uL


 


MPV   11.80   (7.40-12.00)  fL


 


Neut % (Auto)   82.4 H   (48.0-80.0)  %


 


Lymph % (Auto)   7.9 L   (16.0-40.0)  %


 


Mono % (Auto)   8.7   (0.0-15.0)  %


 


Eos % (Auto)   0.9   (0.0-7.0)  %


 


Baso % (Auto)   0.1   (0.0-1.5)  %


 


Neut # (Auto)   7.7 H   (1.4-5.7)  K/uL


 


Lymph # (Auto)   0.7   (0.6-2.4)  K/uL


 


Mono # (Auto)   0.8   (0.0-0.8)  K/uL


 


Eos # (Auto)   0.1   (0.0-0.7)  K/uL


 


Baso # (Auto)   0.0   (0.0-0.1)  K/uL


 


Nucleated RBC %   0.0   /100WBC


 


Nucleated RBCs #   0   K/uL


 


Sodium    151 H  (136-145)  mmol/L


 


Potassium    3.5  (3.5-5.1)  mmol/L


 


Chloride    113 H  ()  mmol/L


 


Carbon Dioxide    28.3  (21.0-32.0)  mmol/L


 


BUN    62 H  (7.0-18.0)  mg/dL


 


Creatinine    1.9 H  (0.6-1.0)  mg/dL


 


Est Cr Clr Drug Dosing    16.11  mL/min


 


Estimated GFR (MDRD)    25.2  ml/min


 


Glucose    333 H  ()  mg/dL


 


POC Glucose  270 H    (70-99)  mg/dL


 


Calcium    8.7  (8.5-10.1)  mg/dL


 


Total Bilirubin     (0.2-1.0)  mg/dL


 


AST     (15-37)  IU/L


 


ALT     (14-63)  IU/L


 


Alkaline Phosphatase     ()  U/L


 


Total Protein     (6.4-8.2)  g/dL


 


Albumin     (3.4-5.0)  g/dL


 


Globulin     (2.6-4.0)  g/dL


 


Albumin/Globulin Ratio     (0.9-1.6)  


 


Random Vancomycin     ug/mL














  12/12/21 12/12/21 12/12/21 Range/Units





  17:06 20:10 20:35 


 


WBC     (4.0-11.0)  K/uL


 


RBC     (4.30-5.90)  M/uL


 


Hgb     (12.0-16.0)  g/dL


 


Hct     (36.0-46.0)  %


 


MCV     (80.0-98.0)  fL


 


MCH     (27.0-32.0)  pg


 


MCHC     (31.0-37.0)  g/dL


 


RDW Std Deviation     (28.0-62.0)  fl


 


RDW Coeff of Jason     (11.0-15.0)  %


 


Plt Count     (150-400)  K/uL


 


MPV     (7.40-12.00)  fL


 


Neut % (Auto)     (48.0-80.0)  %


 


Lymph % (Auto)     (16.0-40.0)  %


 


Mono % (Auto)     (0.0-15.0)  %


 


Eos % (Auto)     (0.0-7.0)  %


 


Baso % (Auto)     (0.0-1.5)  %


 


Neut # (Auto)     (1.4-5.7)  K/uL


 


Lymph # (Auto)     (0.6-2.4)  K/uL


 


Mono # (Auto)     (0.0-0.8)  K/uL


 


Eos # (Auto)     (0.0-0.7)  K/uL


 


Baso # (Auto)     (0.0-0.1)  K/uL


 


Nucleated RBC %     /100WBC


 


Nucleated RBCs #     K/uL


 


Sodium     (136-145)  mmol/L


 


Potassium     (3.5-5.1)  mmol/L


 


Chloride     ()  mmol/L


 


Carbon Dioxide     (21.0-32.0)  mmol/L


 


BUN     (7.0-18.0)  mg/dL


 


Creatinine     (0.6-1.0)  mg/dL


 


Est Cr Clr Drug Dosing     mL/min


 


Estimated GFR (MDRD)     ml/min


 


Glucose     ()  mg/dL


 


POC Glucose  145 H   297 H  (70-99)  mg/dL


 


Calcium     (8.5-10.1)  mg/dL


 


Total Bilirubin     (0.2-1.0)  mg/dL


 


AST     (15-37)  IU/L


 


ALT     (14-63)  IU/L


 


Alkaline Phosphatase     ()  U/L


 


Total Protein     (6.4-8.2)  g/dL


 


Albumin     (3.4-5.0)  g/dL


 


Globulin     (2.6-4.0)  g/dL


 


Albumin/Globulin Ratio     (0.9-1.6)  


 


Random Vancomycin   22.1   ug/mL














  12/13/21 12/13/21 12/13/21 Range/Units





  06:56 09:53 09:53 


 


WBC   10.06   (4.0-11.0)  K/uL


 


RBC   3.54 L   (4.30-5.90)  M/uL


 


Hgb   10.5 L   (12.0-16.0)  g/dL


 


Hct   32.1 L   (36.0-46.0)  %


 


MCV   90.7   (80.0-98.0)  fL


 


MCH   29.7   (27.0-32.0)  pg


 


MCHC   32.7   (31.0-37.0)  g/dL


 


RDW Std Deviation   49.2   (28.0-62.0)  fl


 


RDW Coeff of Jason   15   (11.0-15.0)  %


 


Plt Count   159   (150-400)  K/uL


 


MPV   11.80   (7.40-12.00)  fL


 


Neut % (Auto)   73.7   (48.0-80.0)  %


 


Lymph % (Auto)   15.3 L   (16.0-40.0)  %


 


Mono % (Auto)   9.9   (0.0-15.0)  %


 


Eos % (Auto)   0.9   (0.0-7.0)  %


 


Baso % (Auto)   0.2   (0.0-1.5)  %


 


Neut # (Auto)   7.4 H   (1.4-5.7)  K/uL


 


Lymph # (Auto)   1.5   (0.6-2.4)  K/uL


 


Mono # (Auto)   1.0 H   (0.0-0.8)  K/uL


 


Eos # (Auto)   0.1   (0.0-0.7)  K/uL


 


Baso # (Auto)   0.0   (0.0-0.1)  K/uL


 


Nucleated RBC %   0.0   /100WBC


 


Nucleated RBCs #   0   K/uL


 


Sodium    151 H  (136-145)  mmol/L


 


Potassium    3.3 L  (3.5-5.1)  mmol/L


 


Chloride    113 H  ()  mmol/L


 


Carbon Dioxide    25.5  (21.0-32.0)  mmol/L


 


BUN    57 H  (7.0-18.0)  mg/dL


 


Creatinine    1.9 H  (0.6-1.0)  mg/dL


 


Est Cr Clr Drug Dosing    16.11  mL/min


 


Estimated GFR (MDRD)    25.2  ml/min


 


Glucose    217 H  ()  mg/dL


 


POC Glucose  229 H    (70-99)  mg/dL


 


Calcium    8.6  (8.5-10.1)  mg/dL


 


Total Bilirubin    1.2 H  (0.2-1.0)  mg/dL


 


AST    49 H  (15-37)  IU/L


 


ALT    41  (14-63)  IU/L


 


Alkaline Phosphatase    150 H  ()  U/L


 


Total Protein    5.2 L  (6.4-8.2)  g/dL


 


Albumin    1.6 L  (3.4-5.0)  g/dL


 


Globulin    3.6  (2.6-4.0)  g/dL


 


Albumin/Globulin Ratio    0.4 L  (0.9-1.6)  


 


Random Vancomycin     ug/mL











Result Diagrams: 


                                 12/13/21 09:53





                                 12/13/21 09:53


[facility] Results Last 24 hrs: 


                                  Microbiology











 12/09/21 17:56 Blood Culture Identification Panel - Preliminary





 Blood    Staphylococcus Aureus


 


 12/09/21 18:18 Blood Culture Identification Panel - Preliminary





 Blood 


 


 12/12/21 17:05 Stool Occult Blood ([facility]) - Final





 Stool / Feces 














Sepsis Event Note





- Evaluation


Sepsis Screening Result: No Definite Risk





- Focused Exam


Vital Signs: 


                                   Vital Signs











  Temp Temp Pulse Resp BP Pulse Ox


 


 12/13/21 08:00   36.6 C  102 H  16  128/58 L  92 L


 


 12/13/21 03:38  35.6 C L   102 H  20  125/55 L  92 L














- Problem List & Annotations


(1) Hyperglycemia


SNOMED Code(s): 29154160


   Code(s): R73.9 - HYPERGLYCEMIA, UNSPECIFIED   Status: Acute   Current Visit: 

Yes   





(2) Sepsis


SNOMED Code(s): 26841109


   Code(s): A41.9 - SEPSIS, UNSPECIFIED ORGANISM   Status: Acute   Current 

Visit: Yes   





(3) Acute on chronic renal insufficiency


SNOMED Code(s): 424540629


   Code(s): N28.9 - DISORDER OF KIDNEY AND URETER, UNSPECIFIED; N18.9 - CHRONIC 

KIDNEY DISEASE, UNSPECIFIED   Status: Acute   Current Visit: Yes   





(4) Cellulitis


SNOMED Code(s): 583947114


   Code(s): L03.90 - CELLULITIS, UNSPECIFIED   Status: Acute   Current Visit: 

Yes   





- Problem List Review


Problem List Initiated/Reviewed/Updated: Yes





- My Orders


Last 24 Hours: 


My Active Orders





12/12/21 14:00


Telemetry Monitoring [Cardiac Monitoring] [RC] Q8H 





12/12/21 21:15


Insulin Aspart [NovoLOG]   See Protocol  SUBCUT QIDACANDBED 





12/13/21 12:15


Sodium Chloride 0.45% @ 100 MLS/HR(1,000ml) Sodium Chloride 0.45% 1,000 ml IV 

ASDIRECTED 














- Plan


Plan:: 





80 yo female admitted for sepsis from lower extremity cellulitis


Sepsis: has received 3 L NS on admission, lactic acid normalized,  2/2 blood 

cultures growing staph aureus


Staph aureus Cellulitis: will stop vancomycin, continue clindamycin, on levaquin

 for possible pneumonia


Hyperglycemia: will switch to ssi


unable to get IJ or subclavian line so has femoral line in place

## 2021-12-14 NOTE — PCM.PN
- General Info


Date of Service: 12/14/21





- Patient Data


Vitals - Most Recent: 


                                Last Vital Signs











Temp  36.7 C   12/14/21 12:00


 


Pulse  90   12/14/21 12:00


 


Resp  18   12/14/21 12:00


 


BP  138/70   12/14/21 12:00


 


Pulse Ox  95   12/14/21 12:00











Weight - Most Recent: 70 kg


I&O - Last 24 Hours: 


                                 Intake & Output











 12/13/21 12/14/21 12/14/21





 22:59 06:59 14:59


 


Intake Total 600 1120 


 


Output Total 400  


 


Balance 200 1120 











Lab Results Last 24 Hours: 


                         Laboratory Results - last 24 hr











  12/13/21 12/14/21 12/14/21 Range/Units





  18:08 05:39 05:39 


 


WBC   10.62   (4.0-11.0)  K/uL


 


RBC   3.52 L   (4.30-5.90)  M/uL


 


Hgb   10.4 L   (12.0-16.0)  g/dL


 


Hct   32.0 L   (36.0-46.0)  %


 


MCV   90.9   (80.0-98.0)  fL


 


MCH   29.5   (27.0-32.0)  pg


 


MCHC   32.5   (31.0-37.0)  g/dL


 


RDW Std Deviation   51.1   (28.0-62.0)  fl


 


RDW Coeff of Jason   16 H   (11.0-15.0)  %


 


Plt Count   150   (150-400)  K/uL


 


MPV   12.50 H   (7.40-12.00)  fL


 


Neut % (Auto)   75.6   (48.0-80.0)  %


 


Lymph % (Auto)   14.5 L   (16.0-40.0)  %


 


Mono % (Auto)   8.7   (0.0-15.0)  %


 


Eos % (Auto)   1.1   (0.0-7.0)  %


 


Baso % (Auto)   0.1   (0.0-1.5)  %


 


Neut # (Auto)   8.0 H   (1.4-5.7)  K/uL


 


Lymph # (Auto)   1.5   (0.6-2.4)  K/uL


 


Mono # (Auto)   0.9 H   (0.0-0.8)  K/uL


 


Eos # (Auto)   0.1   (0.0-0.7)  K/uL


 


Baso # (Auto)   0.0   (0.0-0.1)  K/uL


 


Nucleated RBC %   0.0   /100WBC


 


Nucleated RBCs #   0   K/uL


 


Sodium    143  (136-145)  mmol/L


 


Potassium    2.8 L  (3.5-5.1)  mmol/L


 


Chloride    110 H  ()  mmol/L


 


Carbon Dioxide    21.8  (21.0-32.0)  mmol/L


 


BUN    50 H  (7.0-18.0)  mg/dL


 


Creatinine    1.9 H  (0.6-1.0)  mg/dL


 


Est Cr Clr Drug Dosing    16.11  mL/min


 


Estimated GFR (MDRD)    25.2  ml/min


 


Glucose    258 H  ()  mg/dL


 


POC Glucose  211 H    (70-99)  mg/dL


 


Calcium    8.8  (8.5-10.1)  mg/dL


 


Total Bilirubin    0.6  (0.2-1.0)  mg/dL


 


AST    39 H  (15-37)  IU/L


 


ALT    36  (14-63)  IU/L


 


Alkaline Phosphatase    146 H  ()  U/L


 


Total Protein    6.0 L  (6.4-8.2)  g/dL


 


Albumin    1.4 L  (3.4-5.0)  g/dL


 


Globulin    4.6 H  (2.6-4.0)  g/dL


 


Albumin/Globulin Ratio    0.3 L  (0.9-1.6)  














  12/14/21 12/14/21 Range/Units





  06:49 12:19 


 


WBC    (4.0-11.0)  K/uL


 


RBC    (4.30-5.90)  M/uL


 


Hgb    (12.0-16.0)  g/dL


 


Hct    (36.0-46.0)  %


 


MCV    (80.0-98.0)  fL


 


MCH    (27.0-32.0)  pg


 


MCHC    (31.0-37.0)  g/dL


 


RDW Std Deviation    (28.0-62.0)  fl


 


RDW Coeff of Jason    (11.0-15.0)  %


 


Plt Count    (150-400)  K/uL


 


MPV    (7.40-12.00)  fL


 


Neut % (Auto)    (48.0-80.0)  %


 


Lymph % (Auto)    (16.0-40.0)  %


 


Mono % (Auto)    (0.0-15.0)  %


 


Eos % (Auto)    (0.0-7.0)  %


 


Baso % (Auto)    (0.0-1.5)  %


 


Neut # (Auto)    (1.4-5.7)  K/uL


 


Lymph # (Auto)    (0.6-2.4)  K/uL


 


Mono # (Auto)    (0.0-0.8)  K/uL


 


Eos # (Auto)    (0.0-0.7)  K/uL


 


Baso # (Auto)    (0.0-0.1)  K/uL


 


Nucleated RBC %    /100WBC


 


Nucleated RBCs #    K/uL


 


Sodium    (136-145)  mmol/L


 


Potassium    (3.5-5.1)  mmol/L


 


Chloride    ()  mmol/L


 


Carbon Dioxide    (21.0-32.0)  mmol/L


 


BUN    (7.0-18.0)  mg/dL


 


Creatinine    (0.6-1.0)  mg/dL


 


Est Cr Clr Drug Dosing    mL/min


 


Estimated GFR (MDRD)    ml/min


 


Glucose    ()  mg/dL


 


POC Glucose  232 H  146 H  (70-99)  mg/dL


 


Calcium    (8.5-10.1)  mg/dL


 


Total Bilirubin    (0.2-1.0)  mg/dL


 


AST    (15-37)  IU/L


 


ALT    (14-63)  IU/L


 


Alkaline Phosphatase    ()  U/L


 


Total Protein    (6.4-8.2)  g/dL


 


Albumin    (3.4-5.0)  g/dL


 


Globulin    (2.6-4.0)  g/dL


 


Albumin/Globulin Ratio    (0.9-1.6)  











[facility] Results Last 24 Hours: 


                                  Microbiology











 12/09/21 18:18 Blood Culture Identification Panel - Final





 Blood    Staphylococcus Aureus


 


 12/09/21 17:56 Blood Culture Identification Panel - Final





 Blood    Staphylococcus Aureus











Med Orders - Current: 


                               Current Medications





Acetaminophen (Acetaminophen 325 Mg Tab)  650 mg PO Q6H PRN


   PRN Reason: Pain/Fever


Dextrose/Water (50% Dextrose In Water 50 Ml Syringe)  50 ml IVPUSH ASDIRECTED 

PRN


   PRN Reason: Hypoglycemia


Dextrose/Water (50% Dextrose In Water 50 Ml Syringe)  50 ml IVPUSH ASDIRECTED 

PRN


   PRN Reason: Hypoglycemia


Enoxaparin Sodium (Enoxaparin 30 Mg/0.3 Ml Syringe)  30 mg SUBCUT Q24H [facility]


   Last Admin: 12/14/21 00:38 Dose:  30 mg


   Documented by: 


Glucagon (Glucagon,Human Recombinant 1 Mg Vial)  1 mg IM ASDIRECTED PRN


   PRN Reason: Hypoglycemia


Glucagon (Glucagon,Human Recombinant 1 Mg Vial)  1 mg IM ASDIRECTED PRN


   PRN Reason: Hypoglycemia


Clindamycin Phosphate 600 mg/ (Premix)  50 mls @ 100 mls/hr IV Q6H [facility]


   Last Admin: 12/14/21 08:21 Dose:  100 mls/hr


   Documented by: 


Levofloxacin/Dextrose 750 mg/ (Premix)  150 mls @ 100 mls/hr IV Q48H [facility]


   Last Admin: 12/14/21 08:21 Dose:  100 mls/hr


   Documented by: 


Insulin Aspart (Insulin Aspart 100 Units/Ml 3 Ml Pen)  0 unit SUBCUT QIDACANDBED

[facility]; Protocol


   Last Admin: 12/14/21 13:13 Dose:  Not Given


   Documented by: 


Nystatin (Nystatin Topical Powder 15 Gm Bottle)  0 gm TOP BID [facility]


   Last Admin: 12/14/21 08:20 Dose:  1 applic


   Documented by: 


Oxycodone HCl (Oxycodone 5 Mg Tab)  5 mg PO Q4H PRN


   PRN Reason: Pain


   Last Admin: 12/10/21 18:25 Dose:  5 mg


   Documented by: 





Discontinued Medications





Enoxaparin Sodium (Enoxaparin 40 Mg/0.4 Ml Syringe)  40 mg SUBCUT Q24H [facility]


   Last Admin: 12/10/21 00:27 Dose:  Not Given


   Documented by: 


Fentanyl (Fentanyl 100 Mcg/2 Ml Sdv)  25 mcg IVPUSH ONETIME ONE


   Stop: 12/10/21 05:58


   Last Admin: 12/10/21 06:04 Dose:  25 mcg


   Documented by: 


Vancomycin HCl 1.25 gm/ Premix  250 mls @ 166.667 mls/hr IV ONETIME ONE


   Stop: 12/09/21 19:14


   Last Admin: 12/09/21 18:51 Dose:  166.667 mls/hr


   Documented by: 


Sodium Chloride (Normal Saline)  1,000 mls @ 1,000 mls/hr IV .Bolus ONE


   Stop: 12/09/21 19:25


   Last Admin: 12/09/21 18:51 Dose:  1,000 mls/hr


   Documented by: 


Clindamycin Phosphate 600 mg/ (Sodium Chloride)  54 mls @ 100 mls/hr IV ONETIME 

ONE


   Stop: 12/09/21 20:09


   Last Admin: 12/09/21 21:30 Dose:  Not Given


   Documented by: 


Lactated Ringer's (Ringers, Lactated)  1,000 mls @ 999 mls/hr IV .BOLUS ONE


   Stop: 12/09/21 20:59


   Last Admin: 12/09/21 20:38 Dose:  999 mls/hr


   Documented by: 


Insulin Regular in 0.9 % NACL (Myxredlin In Ns 100 Unit/100 Ml)  100 mls @ 4 

mls/hr IV TITRATE [facility]; Protocol


   Last Titration: 12/12/21 11:10 Dose:  0 mls/hr, 0 mls/hr


   Documented by: 


Piperacillin Sod/Tazobactam (Sod 3.375 gm/ Sodium Chloride)  50 mls @ 100 mls/hr

IV Q6H [facility]


Vancomycin HCl 1 gm/ Sodium (Chloride)  250 mls @ 166.667 mls/hr IV BEDTIME [facility]


   Stop: 12/11/21 22:29


   Last Admin: 12/11/21 20:59 Dose:  166.667 mls/hr


   Documented by: 


Vancomycin HCl 1 gm/ Sodium (Chloride)  250 mls @ 166.667 mls/hr IV DAILY [facility]


   Stop: 12/13/21 10:29


   Last Admin: 12/13/21 08:40 Dose:  166.667 mls/hr


   Documented by: 


Sodium Chloride (Sodium Chloride 0.45%)  1,000 mls @ 100 mls/hr IV ONETIME ONE


   Stop: 12/13/21 10:38


   Last Admin: 12/13/21 00:53 Dose:  100 mls/hr


   Documented by: 


Sodium Chloride (Sodium Chloride 0.45%)  1,000 mls @ 100 mls/hr IV ASDIRECTED 

[facility]


   Stop: 12/13/21 22:14


   Last Admin: 12/13/21 16:11 Dose:  100 mls/hr


   Documented by: 


Potassium Chloride/Sodium Chloride (Normal Saline With 40 Meq Kcl)  1,000 mls @ 

150 mls/hr IV ASDIRECTED [facility]


   Stop: 12/14/21 20:09


Insulin Aspart (Insulin Aspart 100 Units/Ml 3 Ml Pen)  0 unit SUBCUT TIDAC [facility]; 

Protocol


   Last Admin: 12/12/21 17:13 Dose:  Not Given


   Documented by: 


Insulin Human Regular (Insulin Regular, Human 100 Units/Ml 10 Ml Vial)  10 unit 

IVPUSH ONETIME ONE; Protocol


   Stop: 12/09/21 19:39


   Last Admin: 12/09/21 20:34 Dose:  10 unit


   Documented by: 


Midazolam HCl (Midazolam 1 Mg/Ml 2 Ml Sdv)  0.5 mg IVPUSH ONETIME ONE


   Stop: 12/11/21 12:39


   Last Admin: 12/11/21 14:25 Dose:  0.5 mg


   Documented by: 


Midazolam HCl (Midazolam 1 Mg/Ml 2 Ml Sdv) Confirm Administered Dose 2 mg .ROUTE

.STK-MED ONE


   Stop: 12/11/21 12:41


   Last Admin: 12/11/21 17:39 Dose:  Not Given


   Documented by: 


Midazolam HCl (Midazolam 1 Mg/Ml 2 Ml Sdv)  0.5 mg IVPUSH ONETIME ONE


   Stop: 12/11/21 15:29


   Last Admin: 12/11/21 17:37 Dose:  0.5 mg


   Documented by: 


Midazolam HCl (Midazolam 1 Mg/Ml 2 Ml Sdv) Confirm Administered Dose 2 mg .ROUTE

.STK-MED ONE


   Stop: 12/11/21 15:35


   Last Admin: 12/11/21 17:39 Dose:  Not Given


   Documented by: 


Potassium Chloride (Potassium Chloride 20 Meq Tab.Er)  40 meq PO ONETIME ONE


   Stop: 12/14/21 13:23


Vancomycin HCl (Pharmacy To Dose - Vancomycin)  1 dose .XX ONETIME ONE


   Stop: 12/09/21 17:28


   Last Admin: 12/09/21 17:46 Dose:  Not Given


   Documented by: 


Vancomycin HCl (Pharmacy To Dose - Vancomycin)  1 dose .XX ASDIRECTED [facility]











- Exam


Central Line Total Time: 1Days  3Hours


General: Alert, Oriented


Neck: Supple


Lungs: Clear to Auscultation, Normal Respiratory Effort


GI/Abdominal Exam: Soft, Non-Tender, No Distention


Extremities: No Pedal Edema, Other (erythema of right leg has improved)





- Patient Data


Lab Results Last 24 hrs: 


                         Laboratory Results - last 24 hr











  12/13/21 12/14/21 12/14/21 Range/Units





  18:08 05:39 05:39 


 


WBC   10.62   (4.0-11.0)  K/uL


 


RBC   3.52 L   (4.30-5.90)  M/uL


 


Hgb   10.4 L   (12.0-16.0)  g/dL


 


Hct   32.0 L   (36.0-46.0)  %


 


MCV   90.9   (80.0-98.0)  fL


 


MCH   29.5   (27.0-32.0)  pg


 


MCHC   32.5   (31.0-37.0)  g/dL


 


RDW Std Deviation   51.1   (28.0-62.0)  fl


 


RDW Coeff of Jason   16 H   (11.0-15.0)  %


 


Plt Count   150   (150-400)  K/uL


 


MPV   12.50 H   (7.40-12.00)  fL


 


Neut % (Auto)   75.6   (48.0-80.0)  %


 


Lymph % (Auto)   14.5 L   (16.0-40.0)  %


 


Mono % (Auto)   8.7   (0.0-15.0)  %


 


Eos % (Auto)   1.1   (0.0-7.0)  %


 


Baso % (Auto)   0.1   (0.0-1.5)  %


 


Neut # (Auto)   8.0 H   (1.4-5.7)  K/uL


 


Lymph # (Auto)   1.5   (0.6-2.4)  K/uL


 


Mono # (Auto)   0.9 H   (0.0-0.8)  K/uL


 


Eos # (Auto)   0.1   (0.0-0.7)  K/uL


 


Baso # (Auto)   0.0   (0.0-0.1)  K/uL


 


Nucleated RBC %   0.0   /100WBC


 


Nucleated RBCs #   0   K/uL


 


Sodium    143  (136-145)  mmol/L


 


Potassium    2.8 L  (3.5-5.1)  mmol/L


 


Chloride    110 H  ()  mmol/L


 


Carbon Dioxide    21.8  (21.0-32.0)  mmol/L


 


BUN    50 H  (7.0-18.0)  mg/dL


 


Creatinine    1.9 H  (0.6-1.0)  mg/dL


 


Est Cr Clr Drug Dosing    16.11  mL/min


 


Estimated GFR (MDRD)    25.2  ml/min


 


Glucose    258 H  ()  mg/dL


 


POC Glucose  211 H    (70-99)  mg/dL


 


Calcium    8.8  (8.5-10.1)  mg/dL


 


Total Bilirubin    0.6  (0.2-1.0)  mg/dL


 


AST    39 H  (15-37)  IU/L


 


ALT    36  (14-63)  IU/L


 


Alkaline Phosphatase    146 H  ()  U/L


 


Total Protein    6.0 L  (6.4-8.2)  g/dL


 


Albumin    1.4 L  (3.4-5.0)  g/dL


 


Globulin    4.6 H  (2.6-4.0)  g/dL


 


Albumin/Globulin Ratio    0.3 L  (0.9-1.6)  














  12/14/21 12/14/21 Range/Units





  06:49 12:19 


 


WBC    (4.0-11.0)  K/uL


 


RBC    (4.30-5.90)  M/uL


 


Hgb    (12.0-16.0)  g/dL


 


Hct    (36.0-46.0)  %


 


MCV    (80.0-98.0)  fL


 


MCH    (27.0-32.0)  pg


 


MCHC    (31.0-37.0)  g/dL


 


RDW Std Deviation    (28.0-62.0)  fl


 


RDW Coeff of Jason    (11.0-15.0)  %


 


Plt Count    (150-400)  K/uL


 


MPV    (7.40-12.00)  fL


 


Neut % (Auto)    (48.0-80.0)  %


 


Lymph % (Auto)    (16.0-40.0)  %


 


Mono % (Auto)    (0.0-15.0)  %


 


Eos % (Auto)    (0.0-7.0)  %


 


Baso % (Auto)    (0.0-1.5)  %


 


Neut # (Auto)    (1.4-5.7)  K/uL


 


Lymph # (Auto)    (0.6-2.4)  K/uL


 


Mono # (Auto)    (0.0-0.8)  K/uL


 


Eos # (Auto)    (0.0-0.7)  K/uL


 


Baso # (Auto)    (0.0-0.1)  K/uL


 


Nucleated RBC %    /100WBC


 


Nucleated RBCs #    K/uL


 


Sodium    (136-145)  mmol/L


 


Potassium    (3.5-5.1)  mmol/L


 


Chloride    ()  mmol/L


 


Carbon Dioxide    (21.0-32.0)  mmol/L


 


BUN    (7.0-18.0)  mg/dL


 


Creatinine    (0.6-1.0)  mg/dL


 


Est Cr Clr Drug Dosing    mL/min


 


Estimated GFR (MDRD)    ml/min


 


Glucose    ()  mg/dL


 


POC Glucose  232 H  146 H  (70-99)  mg/dL


 


Calcium    (8.5-10.1)  mg/dL


 


Total Bilirubin    (0.2-1.0)  mg/dL


 


AST    (15-37)  IU/L


 


ALT    (14-63)  IU/L


 


Alkaline Phosphatase    ()  U/L


 


Total Protein    (6.4-8.2)  g/dL


 


Albumin    (3.4-5.0)  g/dL


 


Globulin    (2.6-4.0)  g/dL


 


Albumin/Globulin Ratio    (0.9-1.6)  











Result Diagrams: 


                                 12/14/21 05:39





                                 12/14/21 05:39


[facility] Results Last 24 hrs: 


                                  Microbiology











 12/09/21 18:18 Blood Culture Identification Panel - Final





 Blood    Staphylococcus Aureus


 


 12/09/21 17:56 Blood Culture Identification Panel - Final





 Blood    Staphylococcus Aureus














Sepsis Event Note





- Evaluation


Sepsis Screening Result: Sepsis Risk





- Focused Exam


Vital Signs: 


                                   Vital Signs











  Temp Temp Pulse Resp BP Pulse Ox


 


 12/14/21 12:00   36.7 C  90  18  138/70  95


 


 12/14/21 08:00   36.9 C  92  18  147/76 H  95


 


 12/14/21 04:00  36.9 C   106 H  24 H  132/67  93 L














- Problem List & Annotations


(1) Hyperglycemia


SNOMED Code(s): 66754941


   Code(s): R73.9 - HYPERGLYCEMIA, UNSPECIFIED   Status: Acute   Current Visit: 

Yes   





(2) Sepsis


SNOMED Code(s): 28687851


   Code(s): A41.9 - SEPSIS, UNSPECIFIED ORGANISM   Status: Acute   Current 

Visit: Yes   





(3) Acute on chronic renal insufficiency


SNOMED Code(s): 209471603


   Code(s): N28.9 - DISORDER OF KIDNEY AND URETER, UNSPECIFIED; N18.9 - CHRONIC 

KIDNEY DISEASE, UNSPECIFIED   Status: Acute   Current Visit: Yes   





(4) Cellulitis


SNOMED Code(s): 857988546


   Code(s): L03.90 - CELLULITIS, UNSPECIFIED   Status: Acute   Current Visit: 

Yes   





- Problem List Review


Problem List Initiated/Reviewed/Updated: Yes





- My Orders


Last 24 Hours: 


My Active Orders





12/14/21 13:34


CULTURE BLOOD [BC] Stat 


CULTURE BLOOD [BC] Stat 


Blood Culture x2 Reflex Set [OM.PC] Stat 





12/15/21 05:11


CBC WITH AUTO DIFF [HEME] AM 


COMPREHENSIVE METABOLIC PN,CMP [CHEM] AM 





12/16/21 05:11


CBC WITH AUTO DIFF [HEME] AM 


COMPREHENSIVE METABOLIC PN,CMP [CHEM] AM 














- Plan


Plan:: 





82 yo female admitted for sepsis from lower extremity cellulitis


Staph aureus Cellulitis: continue clindamycin, on levaquin for possible 

pneumonia


Hyperglycemia: will switch to ssi


unable to get IJ or subclavian line so has femoral line in place

## 2021-12-15 NOTE — PCM.PN
- General Info


Date of Service: 12/15/21





- Review of Systems


Systems Review Comment:: 


nonverbal





- Patient Data


Vitals - Most Recent: 


                                Last Vital Signs











Temp  97.2 C H  12/15/21 12:00


 


Pulse  92   12/15/21 12:00


 


Resp  22 H  12/15/21 12:00


 


BP  129/64   12/15/21 12:00


 


Pulse Ox  98   12/15/21 12:00











Weight - Most Recent: 69.938 kg


I&O - Last 24 Hours: 


                                 Intake & Output











 12/15/21 12/15/21 12/15/21





 06:59 14:59 22:59


 


Intake Total  300 


 


Balance  300 











Lab Results Last 24 Hours: 


                         Laboratory Results - last 24 hr











  12/14/21 12/15/21 12/15/21 Range/Units





  21:50 05:49 05:49 


 


WBC   9.14   (4.0-11.0)  K/uL


 


RBC   3.13 L   (4.30-5.90)  M/uL


 


Hgb   9.3 L   (12.0-16.0)  g/dL


 


Hct   28.2 L   (36.0-46.0)  %


 


MCV   90.1   (80.0-98.0)  fL


 


MCH   29.7   (27.0-32.0)  pg


 


MCHC   33.0   (31.0-37.0)  g/dL


 


RDW Std Deviation   50.6   (28.0-62.0)  fl


 


RDW Coeff of Jason   16 H   (11.0-15.0)  %


 


Plt Count   160   (150-400)  K/uL


 


MPV   11.80   (7.40-12.00)  fL


 


Neut % (Auto)   74.2   (48.0-80.0)  %


 


Lymph % (Auto)   16.3   (16.0-40.0)  %


 


Mono % (Auto)   7.1   (0.0-15.0)  %


 


Eos % (Auto)   2.3   (0.0-7.0)  %


 


Baso % (Auto)   0.1   (0.0-1.5)  %


 


Neut # (Auto)   6.8 H   (1.4-5.7)  K/uL


 


Lymph # (Auto)   1.5   (0.6-2.4)  K/uL


 


Mono # (Auto)   0.7   (0.0-0.8)  K/uL


 


Eos # (Auto)   0.2   (0.0-0.7)  K/uL


 


Baso # (Auto)   0.0   (0.0-0.1)  K/uL


 


Nucleated RBC %   0.0   /100WBC


 


Nucleated RBCs #   0   K/uL


 


Sodium    149 H  (136-145)  mmol/L


 


Potassium    3.3 L  (3.5-5.1)  mmol/L


 


Chloride    116 H  ()  mmol/L


 


Carbon Dioxide    20.5 L  (21.0-32.0)  mmol/L


 


BUN    40 H  (7.0-18.0)  mg/dL


 


Creatinine    1.6 H  (0.6-1.0)  mg/dL


 


Est Cr Clr Drug Dosing    19.14  mL/min


 


Estimated GFR (MDRD)    30.8  ml/min


 


Glucose    118 H  ()  mg/dL


 


POC Glucose  204 H    (70-99)  mg/dL


 


Calcium    8.3 L  (8.5-10.1)  mg/dL


 


Total Bilirubin    0.6  (0.2-1.0)  mg/dL


 


AST    56 H  (15-37)  IU/L


 


ALT    42  (14-63)  IU/L


 


Alkaline Phosphatase    128 H  ()  U/L


 


Total Protein    5.6 L  (6.4-8.2)  g/dL


 


Albumin    1.3 L  (3.4-5.0)  g/dL


 


Globulin    4.3 H  (2.6-4.0)  g/dL


 


Albumin/Globulin Ratio    0.3 L  (0.9-1.6)  














  12/15/21 12/15/21 Range/Units





  07:36 12:12 


 


WBC    (4.0-11.0)  K/uL


 


RBC    (4.30-5.90)  M/uL


 


Hgb    (12.0-16.0)  g/dL


 


Hct    (36.0-46.0)  %


 


MCV    (80.0-98.0)  fL


 


MCH    (27.0-32.0)  pg


 


MCHC    (31.0-37.0)  g/dL


 


RDW Std Deviation    (28.0-62.0)  fl


 


RDW Coeff of Jason    (11.0-15.0)  %


 


Plt Count    (150-400)  K/uL


 


MPV    (7.40-12.00)  fL


 


Neut % (Auto)    (48.0-80.0)  %


 


Lymph % (Auto)    (16.0-40.0)  %


 


Mono % (Auto)    (0.0-15.0)  %


 


Eos % (Auto)    (0.0-7.0)  %


 


Baso % (Auto)    (0.0-1.5)  %


 


Neut # (Auto)    (1.4-5.7)  K/uL


 


Lymph # (Auto)    (0.6-2.4)  K/uL


 


Mono # (Auto)    (0.0-0.8)  K/uL


 


Eos # (Auto)    (0.0-0.7)  K/uL


 


Baso # (Auto)    (0.0-0.1)  K/uL


 


Nucleated RBC %    /100WBC


 


Nucleated RBCs #    K/uL


 


Sodium    (136-145)  mmol/L


 


Potassium    (3.5-5.1)  mmol/L


 


Chloride    ()  mmol/L


 


Carbon Dioxide    (21.0-32.0)  mmol/L


 


BUN    (7.0-18.0)  mg/dL


 


Creatinine    (0.6-1.0)  mg/dL


 


Est Cr Clr Drug Dosing    mL/min


 


Estimated GFR (MDRD)    ml/min


 


Glucose    ()  mg/dL


 


POC Glucose  131 H  120 H  (70-99)  mg/dL


 


Calcium    (8.5-10.1)  mg/dL


 


Total Bilirubin    (0.2-1.0)  mg/dL


 


AST    (15-37)  IU/L


 


ALT    (14-63)  IU/L


 


Alkaline Phosphatase    ()  U/L


 


Total Protein    (6.4-8.2)  g/dL


 


Albumin    (3.4-5.0)  g/dL


 


Globulin    (2.6-4.0)  g/dL


 


Albumin/Globulin Ratio    (0.9-1.6)  











[facility] Results Last 24 Hours: 


                                  Microbiology











 12/14/21 14:57 Aerobic Blood Culture - Preliminary





 Blood - Venous - Lab Draw    NO GROWTH AFTER 1 DAY





 Anaerobic Blood Culture - Final


 


 12/14/21 14:27 Aerobic Blood Culture - Preliminary





 Blood - Venous    NO GROWTH AFTER 1 DAY





 Anaerobic Blood Culture - Preliminary





    NO GROWTH AFTER 1 DAY











Med Orders - Current: 


                               Current Medications





Acetaminophen (Acetaminophen 325 Mg Tab)  650 mg PO Q6H PRN


   PRN Reason: Pain/Fever


Dextrose/Water (50% Dextrose In Water 50 Ml Syringe)  50 ml IVPUSH ASDIRECTED 

PRN


   PRN Reason: Hypoglycemia


Dextrose/Water (50% Dextrose In Water 50 Ml Syringe)  50 ml IVPUSH ASDIRECTED 

PRN


   PRN Reason: Hypoglycemia


Enoxaparin Sodium (Enoxaparin 30 Mg/0.3 Ml Syringe)  30 mg SUBCUT Q24H [facility]


   Last Admin: 12/14/21 21:26 Dose:  30 mg


   Documented by: 


Glucagon (Glucagon,Human Recombinant 1 Mg Vial)  1 mg IM ASDIRECTED PRN


   PRN Reason: Hypoglycemia


Glucagon (Glucagon,Human Recombinant 1 Mg Vial)  1 mg IM ASDIRECTED PRN


   PRN Reason: Hypoglycemia


Clindamycin Phosphate 600 mg/ (Premix)  50 mls @ 100 mls/hr IV Q6H [facility]


   Last Admin: 12/15/21 13:59 Dose:  100 mls/hr


   Documented by: 


Levofloxacin/Dextrose 750 mg/ (Premix)  150 mls @ 100 mls/hr IV Q48H [facility]


   Last Admin: 12/14/21 08:21 Dose:  100 mls/hr


   Documented by: 


Insulin Aspart (Insulin Aspart 100 Units/Ml 3 Ml Pen)  0 unit SUBCUT QIDACANDBED

[facility]; Protocol


   Last Admin: 12/15/21 13:33 Dose:  Not Given


   Documented by: 


Nystatin (Nystatin Topical Powder 15 Gm Bottle)  0 gm TOP BID [facility]


   Last Admin: 12/15/21 09:07 Dose:  1 applic


   Documented by: 


Oxycodone HCl (Oxycodone 5 Mg Tab)  5 mg PO Q4H PRN


   PRN Reason: Pain


   Last Admin: 12/10/21 18:25 Dose:  5 mg


   Documented by: 





Discontinued Medications





Enoxaparin Sodium (Enoxaparin 40 Mg/0.4 Ml Syringe)  40 mg SUBCUT Q24H [facility]


   Last Admin: 12/10/21 00:27 Dose:  Not Given


   Documented by: 


Fentanyl (Fentanyl 100 Mcg/2 Ml Sdv)  25 mcg IVPUSH ONETIME ONE


   Stop: 12/10/21 05:58


   Last Admin: 12/10/21 06:04 Dose:  25 mcg


   Documented by: 


Vancomycin HCl 1.25 gm/ Premix  250 mls @ 166.667 mls/hr IV ONETIME ONE


   Stop: 12/09/21 19:14


   Last Admin: 12/09/21 18:51 Dose:  166.667 mls/hr


   Documented by: 


Sodium Chloride (Normal Saline)  1,000 mls @ 1,000 mls/hr IV .Bolus ONE


   Stop: 12/09/21 19:25


   Last Admin: 12/09/21 18:51 Dose:  1,000 mls/hr


   Documented by: 


Clindamycin Phosphate 600 mg/ (Sodium Chloride)  54 mls @ 100 mls/hr IV ONETIME 

ONE


   Stop: 12/09/21 20:09


   Last Admin: 12/09/21 21:30 Dose:  Not Given


   Documented by: 


Lactated Ringer's (Ringers, Lactated)  1,000 mls @ 999 mls/hr IV .BOLUS ONE


   Stop: 12/09/21 20:59


   Last Admin: 12/09/21 20:38 Dose:  999 mls/hr


   Documented by: 


Insulin Regular in 0.9 % NACL (Myxredlin In Ns 100 Unit/100 Ml)  100 mls @ 4 

mls/hr IV TITRATE [facility]; Protocol


   Last Titration: 12/12/21 11:10 Dose:  0 mls/hr, 0 mls/hr


   Documented by: 


Piperacillin Sod/Tazobactam (Sod 3.375 gm/ Sodium Chloride)  50 mls @ 100 mls/hr

IV Q6H [facility]


Vancomycin HCl 1 gm/ Sodium (Chloride)  250 mls @ 166.667 mls/hr IV BEDTIME [facility]


   Stop: 12/11/21 22:29


   Last Admin: 12/11/21 20:59 Dose:  166.667 mls/hr


   Documented by: 


Vancomycin HCl 1 gm/ Sodium (Chloride)  250 mls @ 166.667 mls/hr IV DAILY [facility]


   Stop: 12/13/21 10:29


   Last Admin: 12/13/21 08:40 Dose:  166.667 mls/hr


   Documented by: 


Sodium Chloride (Sodium Chloride 0.45%)  1,000 mls @ 100 mls/hr IV ONETIME ONE


   Stop: 12/13/21 10:38


   Last Admin: 12/13/21 00:53 Dose:  100 mls/hr


   Documented by: 


Sodium Chloride (Sodium Chloride 0.45%)  1,000 mls @ 100 mls/hr IV ASDIRECTED 

[facility]


   Stop: 12/13/21 22:14


   Last Admin: 12/13/21 16:11 Dose:  100 mls/hr


   Documented by: 


Potassium Chloride/Sodium Chloride (Normal Saline With 40 Meq Kcl)  1,000 mls @ 

150 mls/hr IV ASDIRECTED [facility]


   Stop: 12/14/21 20:09


Potassium Chloride/Sodium Chloride (Normal Saline With 40 Meq Kcl)  1,000 mls @ 

150 mls/hr IV ASDIRECTED [facility]


   Stop: 12/14/21 21:39


   Last Admin: 12/14/21 16:02 Dose:  150 mls/hr


   Documented by: 


Insulin Aspart (Insulin Aspart 100 Units/Ml 3 Ml Pen)  0 unit SUBCUT TIDAC [facility]; 

Protocol


   Last Admin: 12/12/21 17:13 Dose:  Not Given


   Documented by: 


Insulin Human Regular (Insulin Regular, Human 100 Units/Ml 10 Ml Vial)  10 unit 

IVPUSH ONETIME ONE; Protocol


   Stop: 12/09/21 19:39


   Last Admin: 12/09/21 20:34 Dose:  10 unit


   Documented by: 


Midazolam HCl (Midazolam 1 Mg/Ml 2 Ml Sdv)  0.5 mg IVPUSH ONETIME ONE


   Stop: 12/11/21 12:39


   Last Admin: 12/11/21 14:25 Dose:  0.5 mg


   Documented by: 


Midazolam HCl (Midazolam 1 Mg/Ml 2 Ml Sdv) Confirm Administered Dose 2 mg .ROUTE

.STK-MED ONE


   Stop: 12/11/21 12:41


   Last Admin: 12/11/21 17:39 Dose:  Not Given


   Documented by: 


Midazolam HCl (Midazolam 1 Mg/Ml 2 Ml Sdv)  0.5 mg IVPUSH ONETIME ONE


   Stop: 12/11/21 15:29


   Last Admin: 12/11/21 17:37 Dose:  0.5 mg


   Documented by: 


Midazolam HCl (Midazolam 1 Mg/Ml 2 Ml Sdv) Confirm Administered Dose 2 mg .ROUTE

.STK-MED ONE


   Stop: 12/11/21 15:35


   Last Admin: 12/11/21 17:39 Dose:  Not Given


   Documented by: 


Potassium Chloride (Potassium Chloride 10% 20 Meq/15 Ml Soln 30 Ml Ud Cup)  40 

meq PO ONETIME ONE


   Stop: 12/14/21 14:31


   Last Admin: 12/14/21 14:54 Dose:  Not Given


   Documented by: 


Vancomycin HCl (Pharmacy To Dose - Vancomycin)  1 dose .XX ONETIME ONE


   Stop: 12/09/21 17:28


   Last Admin: 12/09/21 17:46 Dose:  Not Given


   Documented by: 


Vancomycin HCl (Pharmacy To Dose - Vancomycin)  1 dose .XX ASDIRECTED [facility]











- Exam


Central Line Total Time: 1Days  3Hours


General: Alert, Oriented


Neck: Supple


Lungs: Clear to Auscultation, Normal Respiratory Effort


Cardiovascular: Regular Rate, Regular Rhythm


GI/Abdominal Exam: Soft, Non-Tender


Extremities: Other (erythema improving over right knee, no effusion)





- Patient Data


Lab Results Last 24 hrs: 


                         Laboratory Results - last 24 hr











  12/14/21 12/15/21 12/15/21 Range/Units





  21:50 05:49 05:49 


 


WBC   9.14   (4.0-11.0)  K/uL


 


RBC   3.13 L   (4.30-5.90)  M/uL


 


Hgb   9.3 L   (12.0-16.0)  g/dL


 


Hct   28.2 L   (36.0-46.0)  %


 


MCV   90.1   (80.0-98.0)  fL


 


MCH   29.7   (27.0-32.0)  pg


 


MCHC   33.0   (31.0-37.0)  g/dL


 


RDW Std Deviation   50.6   (28.0-62.0)  fl


 


RDW Coeff of Jason   16 H   (11.0-15.0)  %


 


Plt Count   160   (150-400)  K/uL


 


MPV   11.80   (7.40-12.00)  fL


 


Neut % (Auto)   74.2   (48.0-80.0)  %


 


Lymph % (Auto)   16.3   (16.0-40.0)  %


 


Mono % (Auto)   7.1   (0.0-15.0)  %


 


Eos % (Auto)   2.3   (0.0-7.0)  %


 


Baso % (Auto)   0.1   (0.0-1.5)  %


 


Neut # (Auto)   6.8 H   (1.4-5.7)  K/uL


 


Lymph # (Auto)   1.5   (0.6-2.4)  K/uL


 


Mono # (Auto)   0.7   (0.0-0.8)  K/uL


 


Eos # (Auto)   0.2   (0.0-0.7)  K/uL


 


Baso # (Auto)   0.0   (0.0-0.1)  K/uL


 


Nucleated RBC %   0.0   /100WBC


 


Nucleated RBCs #   0   K/uL


 


Sodium    149 H  (136-145)  mmol/L


 


Potassium    3.3 L  (3.5-5.1)  mmol/L


 


Chloride    116 H  ()  mmol/L


 


Carbon Dioxide    20.5 L  (21.0-32.0)  mmol/L


 


BUN    40 H  (7.0-18.0)  mg/dL


 


Creatinine    1.6 H  (0.6-1.0)  mg/dL


 


Est Cr Clr Drug Dosing    19.14  mL/min


 


Estimated GFR (MDRD)    30.8  ml/min


 


Glucose    118 H  ()  mg/dL


 


POC Glucose  204 H    (70-99)  mg/dL


 


Calcium    8.3 L  (8.5-10.1)  mg/dL


 


Total Bilirubin    0.6  (0.2-1.0)  mg/dL


 


AST    56 H  (15-37)  IU/L


 


ALT    42  (14-63)  IU/L


 


Alkaline Phosphatase    128 H  ()  U/L


 


Total Protein    5.6 L  (6.4-8.2)  g/dL


 


Albumin    1.3 L  (3.4-5.0)  g/dL


 


Globulin    4.3 H  (2.6-4.0)  g/dL


 


Albumin/Globulin Ratio    0.3 L  (0.9-1.6)  














  12/15/21 12/15/21 Range/Units





  07:36 12:12 


 


WBC    (4.0-11.0)  K/uL


 


RBC    (4.30-5.90)  M/uL


 


Hgb    (12.0-16.0)  g/dL


 


Hct    (36.0-46.0)  %


 


MCV    (80.0-98.0)  fL


 


MCH    (27.0-32.0)  pg


 


MCHC    (31.0-37.0)  g/dL


 


RDW Std Deviation    (28.0-62.0)  fl


 


RDW Coeff of Jason    (11.0-15.0)  %


 


Plt Count    (150-400)  K/uL


 


MPV    (7.40-12.00)  fL


 


Neut % (Auto)    (48.0-80.0)  %


 


Lymph % (Auto)    (16.0-40.0)  %


 


Mono % (Auto)    (0.0-15.0)  %


 


Eos % (Auto)    (0.0-7.0)  %


 


Baso % (Auto)    (0.0-1.5)  %


 


Neut # (Auto)    (1.4-5.7)  K/uL


 


Lymph # (Auto)    (0.6-2.4)  K/uL


 


Mono # (Auto)    (0.0-0.8)  K/uL


 


Eos # (Auto)    (0.0-0.7)  K/uL


 


Baso # (Auto)    (0.0-0.1)  K/uL


 


Nucleated RBC %    /100WBC


 


Nucleated RBCs #    K/uL


 


Sodium    (136-145)  mmol/L


 


Potassium    (3.5-5.1)  mmol/L


 


Chloride    ()  mmol/L


 


Carbon Dioxide    (21.0-32.0)  mmol/L


 


BUN    (7.0-18.0)  mg/dL


 


Creatinine    (0.6-1.0)  mg/dL


 


Est Cr Clr Drug Dosing    mL/min


 


Estimated GFR (MDRD)    ml/min


 


Glucose    ()  mg/dL


 


POC Glucose  131 H  120 H  (70-99)  mg/dL


 


Calcium    (8.5-10.1)  mg/dL


 


Total Bilirubin    (0.2-1.0)  mg/dL


 


AST    (15-37)  IU/L


 


ALT    (14-63)  IU/L


 


Alkaline Phosphatase    ()  U/L


 


Total Protein    (6.4-8.2)  g/dL


 


Albumin    (3.4-5.0)  g/dL


 


Globulin    (2.6-4.0)  g/dL


 


Albumin/Globulin Ratio    (0.9-1.6)  











Result Diagrams: 


                                 12/15/21 05:49





                                 12/15/21 05:49


[facility] Results Last 24 hrs: 


                                  Microbiology











 12/14/21 14:57 Aerobic Blood Culture - Preliminary





 Blood - Venous - Lab Draw    NO GROWTH AFTER 1 DAY





 Anaerobic Blood Culture - Final


 


 12/14/21 14:27 Aerobic Blood Culture - Preliminary





 Blood - Venous    NO GROWTH AFTER 1 DAY





 Anaerobic Blood Culture - Preliminary





    NO GROWTH AFTER 1 DAY














Sepsis Event Note





- Evaluation


Sepsis Screening Result: No Definite Risk





- Focused Exam


Vital Signs: 


                                   Vital Signs











  Temp Pulse Resp BP Pulse Ox


 


 12/15/21 12:00  97.2 C H  92  22 H  129/64  98














- Problem List & Annotations


(1) Hyperglycemia


SNOMED Code(s): 02279588


   Code(s): R73.9 - HYPERGLYCEMIA, UNSPECIFIED   Status: Acute   Current Visit: 

Yes   





(2) Sepsis


SNOMED Code(s): 97180929


   Code(s): A41.9 - SEPSIS, UNSPECIFIED ORGANISM   Status: Acute   Current 

Visit: Yes   





(3) Acute on chronic renal insufficiency


SNOMED Code(s): 693622263


   Code(s): N28.9 - DISORDER OF KIDNEY AND URETER, UNSPECIFIED; N18.9 - CHRONIC 

KIDNEY DISEASE, UNSPECIFIED   Status: Acute   Current Visit: Yes   





(4) Cellulitis


SNOMED Code(s): 977933299


   Code(s): L03.90 - CELLULITIS, UNSPECIFIED   Status: Acute   Current Visit: 

Yes   





- Problem List Review


Problem List Initiated/Reviewed/Updated: Yes





- My Orders


Last 24 Hours: 


My Active Orders





12/16/21 05:11


CBC WITH AUTO DIFF [HEME] AM 


COMPREHENSIVE METABOLIC PN,CMP [CHEM] AM 














- Plan


Plan:: 





82 yo female admitted for sepsis from lower extremity cellulitis


Staph aureus Cellulitis: continue clindamycin, on levaquin for possible 

pneumonia


Hyperglycemia: continue ssi


unable to get IJ or subclavian line so has femoral line in place

## 2021-12-16 NOTE — PCM.PN
- General Info


Date of Service: 12/16/21





- Review of Systems


Systems Review Comment:: 


nonverbal





- Patient Data


Vitals - Most Recent: 


                                Last Vital Signs











Temp  36.8 C   12/16/21 12:00


 


Pulse  91   12/16/21 12:00


 


Resp  21 H  12/16/21 12:00


 


BP  139/59 L  12/16/21 12:00


 


Pulse Ox  100   12/16/21 12:00











Weight - Most Recent: 70.5 kg


I&O - Last 24 Hours: 


                                 Intake & Output











 12/15/21 12/16/21 12/16/21





 22:59 06:59 14:59


 


Intake Total 400  


 


Balance 400  











Lab Results Last 24 Hours: 


                         Laboratory Results - last 24 hr











  12/15/21 12/16/21 12/16/21 Range/Units





  22:07 06:10 06:10 


 


WBC   7.95   (4.0-11.0)  K/uL


 


RBC   3.12 L   (4.30-5.90)  M/uL


 


Hgb   9.2 L   (12.0-16.0)  g/dL


 


Hct   28.1 L   (36.0-46.0)  %


 


MCV   90.1   (80.0-98.0)  fL


 


MCH   29.5   (27.0-32.0)  pg


 


MCHC   32.7   (31.0-37.0)  g/dL


 


RDW Std Deviation   50.9   (28.0-62.0)  fl


 


RDW Coeff of Jason   16 H   (11.0-15.0)  %


 


Plt Count   173   (150-400)  K/uL


 


MPV   11.10   (7.40-12.00)  fL


 


Neut % (Auto)   74.5   (48.0-80.0)  %


 


Lymph % (Auto)   16.9   (16.0-40.0)  %


 


Mono % (Auto)   6.0   (0.0-15.0)  %


 


Eos % (Auto)   2.5   (0.0-7.0)  %


 


Baso % (Auto)   0.1   (0.0-1.5)  %


 


Neut # (Auto)   5.9 H   (1.4-5.7)  K/uL


 


Lymph # (Auto)   1.3   (0.6-2.4)  K/uL


 


Mono # (Auto)   0.5   (0.0-0.8)  K/uL


 


Eos # (Auto)   0.2   (0.0-0.7)  K/uL


 


Baso # (Auto)   0.0   (0.0-0.1)  K/uL


 


Nucleated RBC %   0.0   /100WBC


 


Nucleated RBCs #   0   K/uL


 


Sodium    149 H  (136-145)  mmol/L


 


Potassium    3.1 L  (3.5-5.1)  mmol/L


 


Chloride    116 H  ()  mmol/L


 


Carbon Dioxide    21.9  (21.0-32.0)  mmol/L


 


BUN    34 H  (7.0-18.0)  mg/dL


 


Creatinine    1.7 H  (0.6-1.0)  mg/dL


 


Est Cr Clr Drug Dosing    18.01  mL/min


 


Estimated GFR (MDRD)    28.7  ml/min


 


Glucose    160 H  ()  mg/dL


 


POC Glucose  238 H    (70-99)  mg/dL


 


Calcium    8.7  (8.5-10.1)  mg/dL


 


Total Bilirubin    0.5  (0.2-1.0)  mg/dL


 


AST    44 H  (15-37)  IU/L


 


ALT    40  (14-63)  IU/L


 


Alkaline Phosphatase    132 H  ()  U/L


 


Total Protein    5.8 L  (6.4-8.2)  g/dL


 


Albumin    1.4 L  (3.4-5.0)  g/dL


 


Globulin    4.4 H  (2.6-4.0)  g/dL


 


Albumin/Globulin Ratio    0.3 L  (0.9-1.6)  














  12/16/21 12/16/21 Range/Units





  06:37 11:35 


 


WBC    (4.0-11.0)  K/uL


 


RBC    (4.30-5.90)  M/uL


 


Hgb    (12.0-16.0)  g/dL


 


Hct    (36.0-46.0)  %


 


MCV    (80.0-98.0)  fL


 


MCH    (27.0-32.0)  pg


 


MCHC    (31.0-37.0)  g/dL


 


RDW Std Deviation    (28.0-62.0)  fl


 


RDW Coeff of Jason    (11.0-15.0)  %


 


Plt Count    (150-400)  K/uL


 


MPV    (7.40-12.00)  fL


 


Neut % (Auto)    (48.0-80.0)  %


 


Lymph % (Auto)    (16.0-40.0)  %


 


Mono % (Auto)    (0.0-15.0)  %


 


Eos % (Auto)    (0.0-7.0)  %


 


Baso % (Auto)    (0.0-1.5)  %


 


Neut # (Auto)    (1.4-5.7)  K/uL


 


Lymph # (Auto)    (0.6-2.4)  K/uL


 


Mono # (Auto)    (0.0-0.8)  K/uL


 


Eos # (Auto)    (0.0-0.7)  K/uL


 


Baso # (Auto)    (0.0-0.1)  K/uL


 


Nucleated RBC %    /100WBC


 


Nucleated RBCs #    K/uL


 


Sodium    (136-145)  mmol/L


 


Potassium    (3.5-5.1)  mmol/L


 


Chloride    ()  mmol/L


 


Carbon Dioxide    (21.0-32.0)  mmol/L


 


BUN    (7.0-18.0)  mg/dL


 


Creatinine    (0.6-1.0)  mg/dL


 


Est Cr Clr Drug Dosing    mL/min


 


Estimated GFR (MDRD)    ml/min


 


Glucose    ()  mg/dL


 


POC Glucose  133 H  231 H  (70-99)  mg/dL


 


Calcium    (8.5-10.1)  mg/dL


 


Total Bilirubin    (0.2-1.0)  mg/dL


 


AST    (15-37)  IU/L


 


ALT    (14-63)  IU/L


 


Alkaline Phosphatase    ()  U/L


 


Total Protein    (6.4-8.2)  g/dL


 


Albumin    (3.4-5.0)  g/dL


 


Globulin    (2.6-4.0)  g/dL


 


Albumin/Globulin Ratio    (0.9-1.6)  











[facility] Results Last 24 Hours: 


                                  Microbiology











 12/14/21 14:57 Aerobic Blood Culture - Preliminary





 Blood - Venous - Lab Draw    NO GROWTH AFTER 1 DAY





 Anaerobic Blood Culture - Final


 


 12/14/21 14:27 Aerobic Blood Culture - Preliminary





 Blood - Venous    NO GROWTH AFTER 1 DAY





 Anaerobic Blood Culture - Preliminary





    NO GROWTH AFTER 1 DAY











Med Orders - Current: 


                               Current Medications





Acetaminophen (Acetaminophen 325 Mg Tab)  650 mg PO Q6H PRN


   PRN Reason: Pain/Fever


Dextrose/Water (50% Dextrose In Water 50 Ml Syringe)  50 ml IVPUSH ASDIRECTED 

PRN


   PRN Reason: Hypoglycemia


Dextrose/Water (50% Dextrose In Water 50 Ml Syringe)  50 ml IVPUSH ASDIRECTED 

PRN


   PRN Reason: Hypoglycemia


Enoxaparin Sodium (Enoxaparin 30 Mg/0.3 Ml Syringe)  30 mg SUBCUT Q24H [facility]


   Last Admin: 12/15/21 22:09 Dose:  30 mg


   Documented by: 


Glucagon (Glucagon,Human Recombinant 1 Mg Vial)  1 mg IM ASDIRECTED PRN


   PRN Reason: Hypoglycemia


Glucagon (Glucagon,Human Recombinant 1 Mg Vial)  1 mg IM ASDIRECTED PRN


   PRN Reason: Hypoglycemia


Clindamycin Phosphate 600 mg/ (Premix)  50 mls @ 100 mls/hr IV Q6H [facility]


   Last Admin: 12/16/21 08:51 Dose:  100 mls/hr


   Documented by: 


Levofloxacin/Dextrose 750 mg/ (Premix)  150 mls @ 100 mls/hr IV Q48H [facility]


   Last Admin: 12/16/21 09:51 Dose:  100 mls/hr


   Documented by: 


Dextrose/Water (Dextrose 5% In Water)  1,000 mls @ 125 mls/hr IV ASDIRECTED [facility]


   Stop: 12/16/21 18:45


Insulin Aspart (Insulin Aspart 100 Units/Ml 3 Ml Pen)  0 unit SUBCUT QIDACANDBED

[facility]; Protocol


   Last Admin: 12/16/21 13:34 Dose:  2 unit


   Documented by: 


Nystatin (Nystatin Topical Powder 15 Gm Bottle)  0 gm TOP BID [facility]


   Last Admin: 12/16/21 13:34 Dose:  1 applic


   Documented by: 


Oxycodone HCl (Oxycodone 5 Mg Tab)  5 mg PO Q4H PRN


   PRN Reason: Pain


   Last Admin: 12/10/21 18:25 Dose:  5 mg


   Documented by: 


Potassium Chloride (Potassium Chloride 20 Meq Tab.Er)  40 meq PO ONETIME ONE


   Stop: 12/16/21 13:39





Discontinued Medications





Enoxaparin Sodium (Enoxaparin 40 Mg/0.4 Ml Syringe)  40 mg SUBCUT Q24H [facility]


   Last Admin: 12/10/21 00:27 Dose:  Not Given


   Documented by: 


Fentanyl (Fentanyl 100 Mcg/2 Ml Sdv)  25 mcg IVPUSH ONETIME ONE


   Stop: 12/10/21 05:58


   Last Admin: 12/10/21 06:04 Dose:  25 mcg


   Documented by: 


Vancomycin HCl 1.25 gm/ Premix  250 mls @ 166.667 mls/hr IV ONETIME ONE


   Stop: 12/09/21 19:14


   Last Admin: 12/09/21 18:51 Dose:  166.667 mls/hr


   Documented by: 


Sodium Chloride (Normal Saline)  1,000 mls @ 1,000 mls/hr IV .Bolus ONE


   Stop: 12/09/21 19:25


   Last Admin: 12/09/21 18:51 Dose:  1,000 mls/hr


   Documented by: 


Clindamycin Phosphate 600 mg/ (Sodium Chloride)  54 mls @ 100 mls/hr IV ONETIME 

ONE


   Stop: 12/09/21 20:09


   Last Admin: 12/09/21 21:30 Dose:  Not Given


   Documented by: 


Lactated Ringer's (Ringers, Lactated)  1,000 mls @ 999 mls/hr IV .BOLUS ONE


   Stop: 12/09/21 20:59


   Last Admin: 12/09/21 20:38 Dose:  999 mls/hr


   Documented by: 


Insulin Regular in 0.9 % NACL (Myxredlin In Ns 100 Unit/100 Ml)  100 mls @ 4 

mls/hr IV TITRATE [facility]; Protocol


   Last Titration: 12/12/21 11:10 Dose:  0 mls/hr, 0 mls/hr


   Documented by: 


Piperacillin Sod/Tazobactam (Sod 3.375 gm/ Sodium Chloride)  50 mls @ 100 mls/hr

IV Q6H [facility]


Vancomycin HCl 1 gm/ Sodium (Chloride)  250 mls @ 166.667 mls/hr IV BEDTIME [facility]


   Stop: 12/11/21 22:29


   Last Admin: 12/11/21 20:59 Dose:  166.667 mls/hr


   Documented by: 


Vancomycin HCl 1 gm/ Sodium (Chloride)  250 mls @ 166.667 mls/hr IV DAILY [facility]


   Stop: 12/13/21 10:29


   Last Admin: 12/13/21 08:40 Dose:  166.667 mls/hr


   Documented by: 


Sodium Chloride (Sodium Chloride 0.45%)  1,000 mls @ 100 mls/hr IV ONETIME ONE


   Stop: 12/13/21 10:38


   Last Admin: 12/13/21 00:53 Dose:  100 mls/hr


   Documented by: 


Sodium Chloride (Sodium Chloride 0.45%)  1,000 mls @ 100 mls/hr IV ASDIRECTED 

[facility]


   Stop: 12/13/21 22:14


   Last Admin: 12/13/21 16:11 Dose:  100 mls/hr


   Documented by: 


Potassium Chloride/Sodium Chloride (Normal Saline With 40 Meq Kcl)  1,000 mls @ 

150 mls/hr IV ASDIRECTED [facility]


   Stop: 12/14/21 20:09


Potassium Chloride/Sodium Chloride (Normal Saline With 40 Meq Kcl)  1,000 mls @ 

150 mls/hr IV ASDIRECTED [facility]


   Stop: 12/14/21 21:39


   Last Admin: 12/14/21 16:02 Dose:  150 mls/hr


   Documented by: 


Insulin Aspart (Insulin Aspart 100 Units/Ml 3 Ml Pen)  0 unit SUBCUT TIDAC [facility]; 

Protocol


   Last Admin: 12/12/21 17:13 Dose:  Not Given


   Documented by: 


Insulin Human Regular (Insulin Regular, Human 100 Units/Ml 10 Ml Vial)  10 unit 

IVPUSH ONETIME ONE; Protocol


   Stop: 12/09/21 19:39


   Last Admin: 12/09/21 20:34 Dose:  10 unit


   Documented by: 


Midazolam HCl (Midazolam 1 Mg/Ml 2 Ml Sdv)  0.5 mg IVPUSH ONETIME ONE


   Stop: 12/11/21 12:39


   Last Admin: 12/11/21 14:25 Dose:  0.5 mg


   Documented by: 


Midazolam HCl (Midazolam 1 Mg/Ml 2 Ml Sdv) Confirm Administered Dose 2 mg .ROUTE

.STK-MED ONE


   Stop: 12/11/21 12:41


   Last Admin: 12/11/21 17:39 Dose:  Not Given


   Documented by: 


Midazolam HCl (Midazolam 1 Mg/Ml 2 Ml Sdv)  0.5 mg IVPUSH ONETIME ONE


   Stop: 12/11/21 15:29


   Last Admin: 12/11/21 17:37 Dose:  0.5 mg


   Documented by: 


Midazolam HCl (Midazolam 1 Mg/Ml 2 Ml Sdv) Confirm Administered Dose 2 mg .ROUTE

.STK-MED ONE


   Stop: 12/11/21 15:35


   Last Admin: 12/11/21 17:39 Dose:  Not Given


   Documented by: 


Potassium Chloride (Potassium Chloride 10% 20 Meq/15 Ml Soln 30 Ml Ud Cup)  40 

meq PO ONETIME ONE


   Stop: 12/14/21 14:31


   Last Admin: 12/14/21 14:54 Dose:  Not Given


   Documented by: 


Vancomycin HCl (Pharmacy To Dose - Vancomycin)  1 dose .XX ONETIME ONE


   Stop: 12/09/21 17:28


   Last Admin: 12/09/21 17:46 Dose:  Not Given


   Documented by: 


Vancomycin HCl (Pharmacy To Dose - Vancomycin)  1 dose .XX ASDIRECTED [facility]











- Exam


Central Line Total Time: 1Days  3Hours


General: Alert, Oriented


Neck: Supple


Lungs: Normal Respiratory Effort, Rhonchi


Cardiovascular: Regular Rate, Regular Rhythm


GI/Abdominal Exam: Soft, Non-Tender


Extremities: Non-Tender, No Pedal Edema


Skin: Warm, Dry, Intact


Neurological: No New Focal Deficit





- Patient Data


Lab Results Last 24 hrs: 


                         Laboratory Results - last 24 hr











  12/15/21 12/16/21 12/16/21 Range/Units





  22:07 06:10 06:10 


 


WBC   7.95   (4.0-11.0)  K/uL


 


RBC   3.12 L   (4.30-5.90)  M/uL


 


Hgb   9.2 L   (12.0-16.0)  g/dL


 


Hct   28.1 L   (36.0-46.0)  %


 


MCV   90.1   (80.0-98.0)  fL


 


MCH   29.5   (27.0-32.0)  pg


 


MCHC   32.7   (31.0-37.0)  g/dL


 


RDW Std Deviation   50.9   (28.0-62.0)  fl


 


RDW Coeff of Jason   16 H   (11.0-15.0)  %


 


Plt Count   173   (150-400)  K/uL


 


MPV   11.10   (7.40-12.00)  fL


 


Neut % (Auto)   74.5   (48.0-80.0)  %


 


Lymph % (Auto)   16.9   (16.0-40.0)  %


 


Mono % (Auto)   6.0   (0.0-15.0)  %


 


Eos % (Auto)   2.5   (0.0-7.0)  %


 


Baso % (Auto)   0.1   (0.0-1.5)  %


 


Neut # (Auto)   5.9 H   (1.4-5.7)  K/uL


 


Lymph # (Auto)   1.3   (0.6-2.4)  K/uL


 


Mono # (Auto)   0.5   (0.0-0.8)  K/uL


 


Eos # (Auto)   0.2   (0.0-0.7)  K/uL


 


Baso # (Auto)   0.0   (0.0-0.1)  K/uL


 


Nucleated RBC %   0.0   /100WBC


 


Nucleated RBCs #   0   K/uL


 


Sodium    149 H  (136-145)  mmol/L


 


Potassium    3.1 L  (3.5-5.1)  mmol/L


 


Chloride    116 H  ()  mmol/L


 


Carbon Dioxide    21.9  (21.0-32.0)  mmol/L


 


BUN    34 H  (7.0-18.0)  mg/dL


 


Creatinine    1.7 H  (0.6-1.0)  mg/dL


 


Est Cr Clr Drug Dosing    18.01  mL/min


 


Estimated GFR (MDRD)    28.7  ml/min


 


Glucose    160 H  ()  mg/dL


 


POC Glucose  238 H    (70-99)  mg/dL


 


Calcium    8.7  (8.5-10.1)  mg/dL


 


Total Bilirubin    0.5  (0.2-1.0)  mg/dL


 


AST    44 H  (15-37)  IU/L


 


ALT    40  (14-63)  IU/L


 


Alkaline Phosphatase    132 H  ()  U/L


 


Total Protein    5.8 L  (6.4-8.2)  g/dL


 


Albumin    1.4 L  (3.4-5.0)  g/dL


 


Globulin    4.4 H  (2.6-4.0)  g/dL


 


Albumin/Globulin Ratio    0.3 L  (0.9-1.6)  














  12/16/21 12/16/21 Range/Units





  06:37 11:35 


 


WBC    (4.0-11.0)  K/uL


 


RBC    (4.30-5.90)  M/uL


 


Hgb    (12.0-16.0)  g/dL


 


Hct    (36.0-46.0)  %


 


MCV    (80.0-98.0)  fL


 


MCH    (27.0-32.0)  pg


 


MCHC    (31.0-37.0)  g/dL


 


RDW Std Deviation    (28.0-62.0)  fl


 


RDW Coeff of Jason    (11.0-15.0)  %


 


Plt Count    (150-400)  K/uL


 


MPV    (7.40-12.00)  fL


 


Neut % (Auto)    (48.0-80.0)  %


 


Lymph % (Auto)    (16.0-40.0)  %


 


Mono % (Auto)    (0.0-15.0)  %


 


Eos % (Auto)    (0.0-7.0)  %


 


Baso % (Auto)    (0.0-1.5)  %


 


Neut # (Auto)    (1.4-5.7)  K/uL


 


Lymph # (Auto)    (0.6-2.4)  K/uL


 


Mono # (Auto)    (0.0-0.8)  K/uL


 


Eos # (Auto)    (0.0-0.7)  K/uL


 


Baso # (Auto)    (0.0-0.1)  K/uL


 


Nucleated RBC %    /100WBC


 


Nucleated RBCs #    K/uL


 


Sodium    (136-145)  mmol/L


 


Potassium    (3.5-5.1)  mmol/L


 


Chloride    ()  mmol/L


 


Carbon Dioxide    (21.0-32.0)  mmol/L


 


BUN    (7.0-18.0)  mg/dL


 


Creatinine    (0.6-1.0)  mg/dL


 


Est Cr Clr Drug Dosing    mL/min


 


Estimated GFR (MDRD)    ml/min


 


Glucose    ()  mg/dL


 


POC Glucose  133 H  231 H  (70-99)  mg/dL


 


Calcium    (8.5-10.1)  mg/dL


 


Total Bilirubin    (0.2-1.0)  mg/dL


 


AST    (15-37)  IU/L


 


ALT    (14-63)  IU/L


 


Alkaline Phosphatase    ()  U/L


 


Total Protein    (6.4-8.2)  g/dL


 


Albumin    (3.4-5.0)  g/dL


 


Globulin    (2.6-4.0)  g/dL


 


Albumin/Globulin Ratio    (0.9-1.6)  











Result Diagrams: 


                                 12/16/21 06:10





                                 12/16/21 06:10


[facility] Results Last 24 hrs: 


                                  Microbiology











 12/14/21 14:57 Aerobic Blood Culture - Preliminary





 Blood - Venous - Lab Draw    NO GROWTH AFTER 1 DAY





 Anaerobic Blood Culture - Final


 


 12/14/21 14:27 Aerobic Blood Culture - Preliminary





 Blood - Venous    NO GROWTH AFTER 1 DAY





 Anaerobic Blood Culture - Preliminary





    NO GROWTH AFTER 1 DAY














Sepsis Event Note





- Evaluation


Sepsis Screening Result: No Definite Risk





- Focused Exam


Vital Signs: 


                                   Vital Signs











  Temp Pulse Resp BP Pulse Ox


 


 12/16/21 12:00  36.8 C  91  21 H  139/59 L  100


 


 12/16/21 08:59  36.2 C  88  20  132/63  7 L


 


 12/16/21 04:18  36.2 C  82  16  142/55 H  100














- Problem List & Annotations


(1) Hyperglycemia


SNOMED Code(s): 81649364


   Code(s): R73.9 - HYPERGLYCEMIA, UNSPECIFIED   Status: Acute   Current Visit: 

Yes   





(2) Sepsis


SNOMED Code(s): 25503102


   Code(s): A41.9 - SEPSIS, UNSPECIFIED ORGANISM   Status: Acute   Current 

Visit: Yes   





(3) Acute on chronic renal insufficiency


SNOMED Code(s): 432447943


   Code(s): N28.9 - DISORDER OF KIDNEY AND URETER, UNSPECIFIED; N18.9 - CHRONIC 

KIDNEY DISEASE, UNSPECIFIED   Status: Acute   Current Visit: Yes   





(4) Cellulitis


SNOMED Code(s): 439555548


   Code(s): L03.90 - CELLULITIS, UNSPECIFIED   Status: Acute   Current Visit: 

Yes   





- Problem List Review


Problem List Initiated/Reviewed/Updated: Yes





- My Orders


Last 24 Hours: 


My Active Orders





12/16/21 13:38


Potassium Chloride [Klor-Con M20]   40 meq PO ONETIME ONE 





12/16/21 13:45


Dextrose 5% in Water @ 125 MLS/HR(1,000ml) Dextrose 5% in Water 1,000 ml IV 

ASDIRECTED 














- Plan


Plan:: 





80 yo female admitted for sepsis from lower extremity cellulitis


Staph aureus Cellulitis: continue clindamycin, on levaquin for possible 

pneumonia


Hyperglycemia: continue ssi


unable to get IJ or subclavian line so has femoral line in place

## 2021-12-17 NOTE — PCM.PN
- General Info


Date of Service: 12/17/21





- Review of Systems


Systems Review Comment:: 





nonverbal





- Patient Data


Vitals - Most Recent: 


                                Last Vital Signs











Temp  37.1 C   12/17/21 07:53


 


Pulse  91   12/17/21 07:53


 


Resp  20   12/17/21 07:53


 


BP  133/56 L  12/17/21 07:53


 


Pulse Ox  97   12/17/21 07:53











Weight - Most Recent: 70.5 kg


I&O - Last 24 Hours: 


                                 Intake & Output











 12/16/21 12/17/21 12/17/21





 22:59 06:59 14:59


 


Intake Total 250 1511 


 


Output Total  375 


 


Balance 250 1136 











Lab Results Last 24 Hours: 


                         Laboratory Results - last 24 hr











  12/16/21 12/16/21 12/16/21 Range/Units





  11:35 17:44 21:35 


 


WBC     (4.0-11.0)  K/uL


 


RBC     (4.30-5.90)  M/uL


 


Hgb     (12.0-16.0)  g/dL


 


Hct     (36.0-46.0)  %


 


MCV     (80.0-98.0)  fL


 


MCH     (27.0-32.0)  pg


 


MCHC     (31.0-37.0)  g/dL


 


RDW Std Deviation     (28.0-62.0)  fl


 


RDW Coeff of Jason     (11.0-15.0)  %


 


Plt Count     (150-400)  K/uL


 


MPV     (7.40-12.00)  fL


 


Add Manual Diff     


 


Neutrophils % (Manual)     (48.0-80.0)  %


 


Band Neutrophils %     %


 


Lymphocytes % (Manual)     (16.0-40.0)  %


 


Monocytes % (Manual)     (0.0-15.0)  %


 


Eosinophils % (Manual)     (0.0-7.0)  %


 


Basophils % (Manual)     (0.0-1.5)  %


 


Nucleated RBC %     /100WBC


 


Absolute Seg Neuts     (1.4-5.7)  


 


Band Neutrophils #     


 


Lymphocytes # (Manual)     (0.6-2.4)  


 


Monocytes # (Manual)     (0.0-0.8)  


 


Eosinophils # (Manual)     (0.0-0.7)  


 


Basophils # (Manual)     (0.0-0.1)  


 


Nucleated RBCs #     K/uL


 


Sodium     (136-145)  mmol/L


 


Potassium     (3.5-5.1)  mmol/L


 


Chloride     ()  mmol/L


 


Carbon Dioxide     (21.0-32.0)  mmol/L


 


BUN     (7.0-18.0)  mg/dL


 


Creatinine     (0.6-1.0)  mg/dL


 


Est Cr Clr Drug Dosing     mL/min


 


Estimated GFR (MDRD)     ml/min


 


Glucose     ()  mg/dL


 


POC Glucose  231 H  152 H  165 H  (70-99)  mg/dL


 


Calcium     (8.5-10.1)  mg/dL














  12/17/21 12/17/21 12/17/21 Range/Units





  06:27 06:27 06:41 


 


WBC  8.71    (4.0-11.0)  K/uL


 


RBC  3.19 L    (4.30-5.90)  M/uL


 


Hgb  9.6 L    (12.0-16.0)  g/dL


 


Hct  28.7 L    (36.0-46.0)  %


 


MCV  90.0    (80.0-98.0)  fL


 


MCH  30.1    (27.0-32.0)  pg


 


MCHC  33.4    (31.0-37.0)  g/dL


 


RDW Std Deviation  51.2    (28.0-62.0)  fl


 


RDW Coeff of Jason  16 H    (11.0-15.0)  %


 


Plt Count  182    (150-400)  K/uL


 


MPV  11.10    (7.40-12.00)  fL


 


Add Manual Diff  YES    


 


Neutrophils % (Manual)  67    (48.0-80.0)  %


 


Band Neutrophils %  5    %


 


Lymphocytes % (Manual)  21    (16.0-40.0)  %


 


Monocytes % (Manual)  5    (0.0-15.0)  %


 


Eosinophils % (Manual)  1    (0.0-7.0)  %


 


Basophils % (Manual)  1    (0.0-1.5)  %


 


Nucleated RBC %  0.4    /100WBC


 


Absolute Seg Neuts  5.8 H    (1.4-5.7)  


 


Band Neutrophils #  0.4    


 


Lymphocytes # (Manual)  1.8    (0.6-2.4)  


 


Monocytes # (Manual)  0.4    (0.0-0.8)  


 


Eosinophils # (Manual)  0.1    (0.0-0.7)  


 


Basophils # (Manual)  0.1    (0.0-0.1)  


 


Nucleated RBCs #  0    K/uL


 


Sodium   140   (136-145)  mmol/L


 


Potassium   3.8   (3.5-5.1)  mmol/L


 


Chloride   109 H   ()  mmol/L


 


Carbon Dioxide   18.5 L   (21.0-32.0)  mmol/L


 


BUN   28 H   (7.0-18.0)  mg/dL


 


Creatinine   1.5 H   (0.6-1.0)  mg/dL


 


Est Cr Clr Drug Dosing   20.41   mL/min


 


Estimated GFR (MDRD)   33.2   ml/min


 


Glucose   181 H   ()  mg/dL


 


POC Glucose    148 H  (70-99)  mg/dL


 


Calcium   8.7   (8.5-10.1)  mg/dL











[facility] Results Last 24 Hours: 


                                  Microbiology











 12/14/21 14:57 Aerobic Blood Culture - Preliminary





 Blood - Venous - Lab Draw    NO GROWTH AFTER 2 DAYS





 Anaerobic Blood Culture - Final


 


 12/14/21 14:27 Aerobic Blood Culture - Preliminary





 Blood - Venous    NO GROWTH AFTER 2 DAYS





 Anaerobic Blood Culture - Preliminary





    NO GROWTH AFTER 2 DAYS











Med Orders - Current: 


                               Current Medications





Acetaminophen (Acetaminophen 325 Mg Tab)  650 mg PO Q6H PRN


   PRN Reason: Pain/Fever


Dextrose/Water (50% Dextrose In Water 50 Ml Syringe)  50 ml IVPUSH ASDIRECTED 

PRN


   PRN Reason: Hypoglycemia


Dextrose/Water (50% Dextrose In Water 50 Ml Syringe)  50 ml IVPUSH ASDIRECTED 

PRN


   PRN Reason: Hypoglycemia


Enoxaparin Sodium (Enoxaparin 30 Mg/0.3 Ml Syringe)  30 mg SUBCUT Q24H [facility]


   Last Admin: 12/16/21 22:10 Dose:  30 mg


   Documented by: 


Glucagon (Glucagon,Human Recombinant 1 Mg Vial)  1 mg IM ASDIRECTED PRN


   PRN Reason: Hypoglycemia


Glucagon (Glucagon,Human Recombinant 1 Mg Vial)  1 mg IM ASDIRECTED PRN


   PRN Reason: Hypoglycemia


Clindamycin Phosphate 600 mg/ (Premix)  50 mls @ 100 mls/hr IV Q6H [facility]


   Last Admin: 12/17/21 07:47 Dose:  100 mls/hr


   Documented by: 


Levofloxacin/Dextrose 750 mg/ (Premix)  150 mls @ 100 mls/hr IV Q48H [facility]


   Last Admin: 12/16/21 09:51 Dose:  100 mls/hr


   Documented by: 


Insulin Aspart (Insulin Aspart 100 Units/Ml 3 Ml Pen)  0 unit SUBCUT QIDACANDBED

[facility]; Protocol


   Last Admin: 12/17/21 06:57 Dose:  Not Given


   Documented by: 


Nystatin (Nystatin Topical Powder 15 Gm Bottle)  0 gm TOP BID [facility]


   Last Admin: 12/16/21 21:38 Dose:  1 applic


   Documented by: 


Oxycodone HCl (Oxycodone 5 Mg Tab)  5 mg PO Q4H PRN


   PRN Reason: Pain


   Last Admin: 12/10/21 18:25 Dose:  5 mg


   Documented by: 





Discontinued Medications





Enoxaparin Sodium (Enoxaparin 40 Mg/0.4 Ml Syringe)  40 mg SUBCUT Q24H [facility]


   Last Admin: 12/10/21 00:27 Dose:  Not Given


   Documented by: 


Fentanyl (Fentanyl 100 Mcg/2 Ml Sdv)  25 mcg IVPUSH ONETIME ONE


   Stop: 12/10/21 05:58


   Last Admin: 12/10/21 06:04 Dose:  25 mcg


   Documented by: 


Vancomycin HCl 1.25 gm/ Premix  250 mls @ 166.667 mls/hr IV ONETIME ONE


   Stop: 12/09/21 19:14


   Last Admin: 12/09/21 18:51 Dose:  166.667 mls/hr


   Documented by: 


Sodium Chloride (Normal Saline)  1,000 mls @ 1,000 mls/hr IV .Bolus ONE


   Stop: 12/09/21 19:25


   Last Admin: 12/09/21 18:51 Dose:  1,000 mls/hr


   Documented by: 


Clindamycin Phosphate 600 mg/ (Sodium Chloride)  54 mls @ 100 mls/hr IV ONETIME 

ONE


   Stop: 12/09/21 20:09


   Last Admin: 12/09/21 21:30 Dose:  Not Given


   Documented by: 


Lactated Ringer's (Ringers, Lactated)  1,000 mls @ 999 mls/hr IV .BOLUS ONE


   Stop: 12/09/21 20:59


   Last Admin: 12/09/21 20:38 Dose:  999 mls/hr


   Documented by: 


Insulin Regular in 0.9 % NACL (Myxredlin In Ns 100 Unit/100 Ml)  100 mls @ 4 

mls/hr IV TITRATE [facility]; Protocol


   Last Titration: 12/12/21 11:10 Dose:  0 mls/hr, 0 mls/hr


   Documented by: 


Piperacillin Sod/Tazobactam (Sod 3.375 gm/ Sodium Chloride)  50 mls @ 100 mls/hr

IV Q6H [facility]


Vancomycin HCl 1 gm/ Sodium (Chloride)  250 mls @ 166.667 mls/hr IV BEDTIME [facility]


   Stop: 12/11/21 22:29


   Last Admin: 12/11/21 20:59 Dose:  166.667 mls/hr


   Documented by: 


Vancomycin HCl 1 gm/ Sodium (Chloride)  250 mls @ 166.667 mls/hr IV DAILY [facility]


   Stop: 12/13/21 10:29


   Last Admin: 12/13/21 08:40 Dose:  166.667 mls/hr


   Documented by: 


Sodium Chloride (Sodium Chloride 0.45%)  1,000 mls @ 100 mls/hr IV ONETIME ONE


   Stop: 12/13/21 10:38


   Last Admin: 12/13/21 00:53 Dose:  100 mls/hr


   Documented by: 


Sodium Chloride (Sodium Chloride 0.45%)  1,000 mls @ 100 mls/hr IV ASDIRECTED 

[facility]


   Stop: 12/13/21 22:14


   Last Admin: 12/13/21 16:11 Dose:  100 mls/hr


   Documented by: 


Potassium Chloride/Sodium Chloride (Normal Saline With 40 Meq Kcl)  1,000 mls @ 

150 mls/hr IV ASDIRECTED [facility]


   Stop: 12/14/21 20:09


Potassium Chloride/Sodium Chloride (Normal Saline With 40 Meq Kcl)  1,000 mls @ 

150 mls/hr IV ASDIRECTED [facility]


   Stop: 12/14/21 21:39


   Last Admin: 12/14/21 16:02 Dose:  150 mls/hr


   Documented by: 


Dextrose/Water (Dextrose 5% In Water)  1,000 mls @ 125 mls/hr IV ASDIRECTED [facility]


   Stop: 12/16/21 18:45


   Last Admin: 12/16/21 14:36 Dose:  125 mls/hr


   Documented by: 


Insulin Aspart (Insulin Aspart 100 Units/Ml 3 Ml Pen)  0 unit SUBCUT TIDAC [facility]; 

Protocol


   Last Admin: 12/12/21 17:13 Dose:  Not Given


   Documented by: 


Insulin Human Regular (Insulin Regular, Human 100 Units/Ml 10 Ml Vial)  10 unit 

IVPUSH ONETIME ONE; Protocol


   Stop: 12/09/21 19:39


   Last Admin: 12/09/21 20:34 Dose:  10 unit


   Documented by: 


Midazolam HCl (Midazolam 1 Mg/Ml 2 Ml Sdv)  0.5 mg IVPUSH ONETIME ONE


   Stop: 12/11/21 12:39


   Last Admin: 12/11/21 14:25 Dose:  0.5 mg


   Documented by: 


Midazolam HCl (Midazolam 1 Mg/Ml 2 Ml Sdv) Confirm Administered Dose 2 mg .ROUTE

.STK-MED ONE


   Stop: 12/11/21 12:41


   Last Admin: 12/11/21 17:39 Dose:  Not Given


   Documented by: 


Midazolam HCl (Midazolam 1 Mg/Ml 2 Ml Sdv)  0.5 mg IVPUSH ONETIME ONE


   Stop: 12/11/21 15:29


   Last Admin: 12/11/21 17:37 Dose:  0.5 mg


   Documented by: 


Midazolam HCl (Midazolam 1 Mg/Ml 2 Ml Sdv) Confirm Administered Dose 2 mg .ROUTE

.STK-MED ONE


   Stop: 12/11/21 15:35


   Last Admin: 12/11/21 17:39 Dose:  Not Given


   Documented by: 


Potassium Chloride (Potassium Chloride 10% 20 Meq/15 Ml Soln 30 Ml Ud Cup)  40 

meq PO ONETIME ONE


   Stop: 12/14/21 14:31


   Last Admin: 12/14/21 14:54 Dose:  Not Given


   Documented by: 


Potassium Chloride (Potassium Chloride 20 Meq Tab.Er)  40 meq PO ONETIME ONE


   Stop: 12/16/21 13:39


   Last Admin: 12/16/21 14:36 Dose:  40 meq


   Documented by: 


Vancomycin HCl (Pharmacy To Dose - Vancomycin)  1 dose .XX ONETIME ONE


   Stop: 12/09/21 17:28


   Last Admin: 12/09/21 17:46 Dose:  Not Given


   Documented by: 


Vancomycin HCl (Pharmacy To Dose - Vancomycin)  1 dose .XX ASDIRECTED MANE











- Exam


Central Line Total Time: 5Days  5Hours


General: Cooperative, No Acute Distress


Lungs: Clear to Auscultation, Normal Respiratory Effort


Cardiovascular: Regular Rate, Regular Rhythm


GI/Abdominal Exam: Normal Bowel Sounds, Soft, Non-Tender


Extremities: Non-Tender, No Pedal Edema


Skin: Warm, Dry, Intact


Neurological: No New Focal Deficit





- Patient Data


Lab Results Last 24 hrs: 


                         Laboratory Results - last 24 hr











  12/16/21 12/16/21 12/16/21 Range/Units





  11:35 17:44 21:35 


 


WBC     (4.0-11.0)  K/uL


 


RBC     (4.30-5.90)  M/uL


 


Hgb     (12.0-16.0)  g/dL


 


Hct     (36.0-46.0)  %


 


MCV     (80.0-98.0)  fL


 


MCH     (27.0-32.0)  pg


 


MCHC     (31.0-37.0)  g/dL


 


RDW Std Deviation     (28.0-62.0)  fl


 


RDW Coeff of Jason     (11.0-15.0)  %


 


Plt Count     (150-400)  K/uL


 


MPV     (7.40-12.00)  fL


 


Add Manual Diff     


 


Neutrophils % (Manual)     (48.0-80.0)  %


 


Band Neutrophils %     %


 


Lymphocytes % (Manual)     (16.0-40.0)  %


 


Monocytes % (Manual)     (0.0-15.0)  %


 


Eosinophils % (Manual)     (0.0-7.0)  %


 


Basophils % (Manual)     (0.0-1.5)  %


 


Nucleated RBC %     /100WBC


 


Absolute Seg Neuts     (1.4-5.7)  


 


Band Neutrophils #     


 


Lymphocytes # (Manual)     (0.6-2.4)  


 


Monocytes # (Manual)     (0.0-0.8)  


 


Eosinophils # (Manual)     (0.0-0.7)  


 


Basophils # (Manual)     (0.0-0.1)  


 


Nucleated RBCs #     K/uL


 


Sodium     (136-145)  mmol/L


 


Potassium     (3.5-5.1)  mmol/L


 


Chloride     ()  mmol/L


 


Carbon Dioxide     (21.0-32.0)  mmol/L


 


BUN     (7.0-18.0)  mg/dL


 


Creatinine     (0.6-1.0)  mg/dL


 


Est Cr Clr Drug Dosing     mL/min


 


Estimated GFR (MDRD)     ml/min


 


Glucose     ()  mg/dL


 


POC Glucose  231 H  152 H  165 H  (70-99)  mg/dL


 


Calcium     (8.5-10.1)  mg/dL














  12/17/21 12/17/21 12/17/21 Range/Units





  06:27 06:27 06:41 


 


WBC  8.71    (4.0-11.0)  K/uL


 


RBC  3.19 L    (4.30-5.90)  M/uL


 


Hgb  9.6 L    (12.0-16.0)  g/dL


 


Hct  28.7 L    (36.0-46.0)  %


 


MCV  90.0    (80.0-98.0)  fL


 


MCH  30.1    (27.0-32.0)  pg


 


MCHC  33.4    (31.0-37.0)  g/dL


 


RDW Std Deviation  51.2    (28.0-62.0)  fl


 


RDW Coeff of Jason  16 H    (11.0-15.0)  %


 


Plt Count  182    (150-400)  K/uL


 


MPV  11.10    (7.40-12.00)  fL


 


Add Manual Diff  YES    


 


Neutrophils % (Manual)  67    (48.0-80.0)  %


 


Band Neutrophils %  5    %


 


Lymphocytes % (Manual)  21    (16.0-40.0)  %


 


Monocytes % (Manual)  5    (0.0-15.0)  %


 


Eosinophils % (Manual)  1    (0.0-7.0)  %


 


Basophils % (Manual)  1    (0.0-1.5)  %


 


Nucleated RBC %  0.4    /100WBC


 


Absolute Seg Neuts  5.8 H    (1.4-5.7)  


 


Band Neutrophils #  0.4    


 


Lymphocytes # (Manual)  1.8    (0.6-2.4)  


 


Monocytes # (Manual)  0.4    (0.0-0.8)  


 


Eosinophils # (Manual)  0.1    (0.0-0.7)  


 


Basophils # (Manual)  0.1    (0.0-0.1)  


 


Nucleated RBCs #  0    K/uL


 


Sodium   140   (136-145)  mmol/L


 


Potassium   3.8   (3.5-5.1)  mmol/L


 


Chloride   109 H   ()  mmol/L


 


Carbon Dioxide   18.5 L   (21.0-32.0)  mmol/L


 


BUN   28 H   (7.0-18.0)  mg/dL


 


Creatinine   1.5 H   (0.6-1.0)  mg/dL


 


Est Cr Clr Drug Dosing   20.41   mL/min


 


Estimated GFR (MDRD)   33.2   ml/min


 


Glucose   181 H   ()  mg/dL


 


POC Glucose    148 H  (70-99)  mg/dL


 


Calcium   8.7   (8.5-10.1)  mg/dL











Result Diagrams: 


                                 12/17/21 06:27





                                 12/17/21 06:27


[facility] Results Last 24 hrs: 


                                  Microbiology











 12/14/21 14:57 Aerobic Blood Culture - Preliminary





 Blood - Venous - Lab Draw    NO GROWTH AFTER 2 DAYS





 Anaerobic Blood Culture - Final


 


 12/14/21 14:27 Aerobic Blood Culture - Preliminary





 Blood - Venous    NO GROWTH AFTER 2 DAYS





 Anaerobic Blood Culture - Preliminary





    NO GROWTH AFTER 2 DAYS














Sepsis Event Note





- Evaluation


Sepsis Screening Result: No Definite Risk





- Focused Exam


Vital Signs: 


                                   Vital Signs











  Temp Pulse Resp BP Pulse Ox


 


 12/17/21 07:53  37.1 C  91  20  133/56 L  97


 


 12/17/21 03:15  37.2 C  97  20  126/54 L  97


 


 12/17/21 00:00  35.3 C L  97  20  99/40 L  97














- Problem List & Annotations


(1) Hyperglycemia


SNOMED Code(s): 20150720


   Code(s): R73.9 - HYPERGLYCEMIA, UNSPECIFIED   Status: Acute   Current Visit: 

Yes   





(2) Sepsis


SNOMED Code(s): 37313512


   Code(s): A41.9 - SEPSIS, UNSPECIFIED ORGANISM   Status: Acute   Current Vi

sit: Yes   





(3) Acute on chronic renal insufficiency


SNOMED Code(s): 376005582


   Code(s): N28.9 - DISORDER OF KIDNEY AND URETER, UNSPECIFIED; N18.9 - CHRONIC 

KIDNEY DISEASE, UNSPECIFIED   Status: Acute   Current Visit: Yes   





(4) Cellulitis


SNOMED Code(s): 045224357


   Code(s): L03.90 - CELLULITIS, UNSPECIFIED   Status: Acute   Current Visit: 

Yes   





- Problem List Review


Problem List Initiated/Reviewed/Updated: Yes





- My Orders


Last 24 Hours: 


My Active Orders





12/17/21 10:36


C DIFFICILE AG/TOXIN W/REFLEX [RM] Routine 














- Plan


Plan:: 





80 yo female admitted for sepsis from lower extremity cellulitis


Staph aureus Cellulitis: continue clindamycin, on levaquin for possible 

pneumonia


Hyperglycemia: continue ssi


femoral central line removed yestereay

## 2021-12-18 NOTE — PCM.PN
- General Info


Date of Service: 12/18/21





- Review of Systems


Systems Review Comment:: 





nonverbal





- Patient Data


Vitals - Most Recent: 


                                Last Vital Signs











Temp  36.6 C   12/18/21 04:00


 


Pulse  90   12/18/21 10:30


 


Resp  18   12/18/21 10:30


 


BP  141/63 H  12/18/21 10:30


 


Pulse Ox  96   12/18/21 10:30











Weight - Most Recent: 70.5 kg


I&O - Last 24 Hours: 


                                 Intake & Output











 12/17/21 12/18/21 12/18/21





 22:59 06:59 14:59


 


Intake Total 490  


 


Output Total 500  


 


Balance -10  











Lab Results Last 24 Hours: 


                         Laboratory Results - last 24 hr











  12/17/21 12/18/21 12/18/21 Range/Units





  17:51 05:25 10:55 


 


WBC    7.75  (4.0-11.0)  K/uL


 


RBC    3.02 L  (4.30-5.90)  M/uL


 


Hgb    9.0 L  (12.0-16.0)  g/dL


 


Hct    27.0 L  (36.0-46.0)  %


 


MCV    89.4  (80.0-98.0)  fL


 


MCH    29.8  (27.0-32.0)  pg


 


MCHC    33.3  (31.0-37.0)  g/dL


 


RDW Std Deviation    51.7  (28.0-62.0)  fl


 


RDW Coeff of Jason    16 H  (11.0-15.0)  %


 


Plt Count    180  (150-400)  K/uL


 


MPV    10.50  (7.40-12.00)  fL


 


Add Manual Diff    YES  


 


Neutrophils % (Manual)    63  (48.0-80.0)  %


 


Band Neutrophils %    13  %


 


Lymphocytes % (Manual)    19  (16.0-40.0)  %


 


Monocytes % (Manual)    1  (0.0-15.0)  %


 


Eosinophils % (Manual)    2  (0.0-7.0)  %


 


Basophils % (Manual)    1  (0.0-1.5)  %


 


Myelocytes %    1  %


 


Nucleated RBC %    0.0  /100WBC


 


Absolute Seg Neuts    4.9  (1.4-5.7)  


 


Band Neutrophils #    1.0  


 


Lymphocytes # (Manual)    1.5  (0.6-2.4)  


 


Monocytes # (Manual)    0.1  (0.0-0.8)  


 


Eosinophils # (Manual)    0.2  (0.0-0.7)  


 


Basophils # (Manual)    0.1  (0.0-0.1)  


 


Absolute Myelocytes    0.1  


 


Nucleated RBCs #    0  K/uL


 


Sodium     (136-145)  mmol/L


 


Potassium     (3.5-5.1)  mmol/L


 


Chloride     ()  mmol/L


 


Carbon Dioxide     (21.0-32.0)  mmol/L


 


BUN     (7.0-18.0)  mg/dL


 


Creatinine     (0.6-1.0)  mg/dL


 


Est Cr Clr Drug Dosing     mL/min


 


Estimated GFR (MDRD)     ml/min


 


Glucose     ()  mg/dL


 


POC Glucose  142 H  97   (70-99)  mg/dL


 


Calcium     (8.5-10.1)  mg/dL














  12/18/21 12/18/21 Range/Units





  10:55 13:26 


 


WBC    (4.0-11.0)  K/uL


 


RBC    (4.30-5.90)  M/uL


 


Hgb    (12.0-16.0)  g/dL


 


Hct    (36.0-46.0)  %


 


MCV    (80.0-98.0)  fL


 


MCH    (27.0-32.0)  pg


 


MCHC    (31.0-37.0)  g/dL


 


RDW Std Deviation    (28.0-62.0)  fl


 


RDW Coeff of Jason    (11.0-15.0)  %


 


Plt Count    (150-400)  K/uL


 


MPV    (7.40-12.00)  fL


 


Add Manual Diff    


 


Neutrophils % (Manual)    (48.0-80.0)  %


 


Band Neutrophils %    %


 


Lymphocytes % (Manual)    (16.0-40.0)  %


 


Monocytes % (Manual)    (0.0-15.0)  %


 


Eosinophils % (Manual)    (0.0-7.0)  %


 


Basophils % (Manual)    (0.0-1.5)  %


 


Myelocytes %    %


 


Nucleated RBC %    /100WBC


 


Absolute Seg Neuts    (1.4-5.7)  


 


Band Neutrophils #    


 


Lymphocytes # (Manual)    (0.6-2.4)  


 


Monocytes # (Manual)    (0.0-0.8)  


 


Eosinophils # (Manual)    (0.0-0.7)  


 


Basophils # (Manual)    (0.0-0.1)  


 


Absolute Myelocytes    


 


Nucleated RBCs #    K/uL


 


Sodium  141   (136-145)  mmol/L


 


Potassium  3.4 L   (3.5-5.1)  mmol/L


 


Chloride  110 H   ()  mmol/L


 


Carbon Dioxide  20.2 L   (21.0-32.0)  mmol/L


 


BUN  22 H   (7.0-18.0)  mg/dL


 


Creatinine  1.3 H   (0.6-1.0)  mg/dL


 


Est Cr Clr Drug Dosing  23.55   mL/min


 


Estimated GFR (MDRD)  39.1   ml/min


 


Glucose  160 H   ()  mg/dL


 


POC Glucose   132 H  (70-99)  mg/dL


 


Calcium  8.0 L   (8.5-10.1)  mg/dL











[facility] Results Last 24 Hours: 


                                  Microbiology











 12/17/21 14:15 C. difficile Antigen & Toxins A,B - Final





 Stool / Feces 


 


 12/14/21 14:57 Aerobic Blood Culture - Preliminary





 Blood - Venous - Lab Draw    NO GROWTH AFTER 3 DAYS





 Anaerobic Blood Culture - Final


 


 12/14/21 14:27 Aerobic Blood Culture - Preliminary





 Blood - Venous    NO GROWTH AFTER 3 DAYS





 Anaerobic Blood Culture - Preliminary





    NO GROWTH AFTER 3 DAYS











Med Orders - Current: 


                               Current Medications





Acetaminophen (Acetaminophen 325 Mg Tab)  650 mg PO Q6H PRN


   PRN Reason: Pain/Fever


Dextrose/Water (50% Dextrose In Water 50 Ml Syringe)  50 ml IVPUSH ASDIRECTED 

PRN


   PRN Reason: Hypoglycemia


Dextrose/Water (50% Dextrose In Water 50 Ml Syringe)  50 ml IVPUSH ASDIRECTED 

PRN


   PRN Reason: Hypoglycemia


Enoxaparin Sodium (Enoxaparin 30 Mg/0.3 Ml Syringe)  30 mg SUBCUT Q24H [facility]


   Last Admin: 12/17/21 22:23 Dose:  30 mg


   Documented by: 


Glucagon (Glucagon,Human Recombinant 1 Mg Vial)  1 mg IM ASDIRECTED PRN


   PRN Reason: Hypoglycemia


Glucagon (Glucagon,Human Recombinant 1 Mg Vial)  1 mg IM ASDIRECTED PRN


   PRN Reason: Hypoglycemia


Clindamycin Phosphate 600 mg/ (Premix)  50 mls @ 100 mls/hr IV Q6H [facility]


   Last Admin: 12/18/21 08:19 Dose:  100 mls/hr


   Documented by: 


Levofloxacin/Dextrose 750 mg/ (Premix)  150 mls @ 100 mls/hr IV Q48H [facility]


   Last Admin: 12/18/21 09:33 Dose:  100 mls/hr


   Documented by: 


Insulin Aspart (Insulin Aspart 100 Units/Ml 3 Ml Pen)  0 unit SUBCUT QIDACANDBED

[facility]; Protocol


   Last Admin: 12/18/21 13:43 Dose:  Not Given


   Documented by: 


Nystatin (Nystatin Topical Powder 15 Gm Bottle)  0 gm TOP BID MANE


   Last Admin: 12/18/21 08:20 Dose:  1 applic


   Documented by: 


Oxycodone HCl (Oxycodone 5 Mg Tab)  5 mg PO Q4H PRN


   PRN Reason: Pain


   Last Admin: 12/10/21 18:25 Dose:  5 mg


   Documented by: 





Discontinued Medications





Enoxaparin Sodium (Enoxaparin 40 Mg/0.4 Ml Syringe)  40 mg SUBCUT Q24H MANE


   Last Admin: 12/10/21 00:27 Dose:  Not Given


   Documented by: 


Fentanyl (Fentanyl 100 Mcg/2 Ml Sdv)  25 mcg IVPUSH ONETIME ONE


   Stop: 12/10/21 05:58


   Last Admin: 12/10/21 06:04 Dose:  25 mcg


   Documented by: 


Vancomycin HCl 1.25 gm/ Premix  250 mls @ 166.667 mls/hr IV ONETIME ONE


   Stop: 12/09/21 19:14


   Last Admin: 12/09/21 18:51 Dose:  166.667 mls/hr


   Documented by: 


Sodium Chloride (Normal Saline)  1,000 mls @ 1,000 mls/hr IV .Bolus ONE


   Stop: 12/09/21 19:25


   Last Admin: 12/09/21 18:51 Dose:  1,000 mls/hr


   Documented by: 


Clindamycin Phosphate 600 mg/ (Sodium Chloride)  54 mls @ 100 mls/hr IV ONETIME 

ONE


   Stop: 12/09/21 20:09


   Last Admin: 12/09/21 21:30 Dose:  Not Given


   Documented by: 


Lactated Ringer's (Ringers, Lactated)  1,000 mls @ 999 mls/hr IV .BOLUS ONE


   Stop: 12/09/21 20:59


   Last Admin: 12/09/21 20:38 Dose:  999 mls/hr


   Documented by: 


Insulin Regular in 0.9 % NACL (Myxredlin In Ns 100 Unit/100 Ml)  100 mls @ 4 

mls/hr IV TITRATE [facility]; Protocol


   Last Titration: 12/12/21 11:10 Dose:  0 mls/hr, 0 mls/hr


   Documented by: 


Piperacillin Sod/Tazobactam (Sod 3.375 gm/ Sodium Chloride)  50 mls @ 100 mls/hr

IV Q6H [facility]


Vancomycin HCl 1 gm/ Sodium (Chloride)  250 mls @ 166.667 mls/hr IV BEDTIME MANE


   Stop: 12/11/21 22:29


   Last Admin: 12/11/21 20:59 Dose:  166.667 mls/hr


   Documented by: 


Vancomycin HCl 1 gm/ Sodium (Chloride)  250 mls @ 166.667 mls/hr IV DAILY [facility]


   Stop: 12/13/21 10:29


   Last Admin: 12/13/21 08:40 Dose:  166.667 mls/hr


   Documented by: 


Sodium Chloride (Sodium Chloride 0.45%)  1,000 mls @ 100 mls/hr IV ONETIME ONE


   Stop: 12/13/21 10:38


   Last Admin: 12/13/21 00:53 Dose:  100 mls/hr


   Documented by: 


Sodium Chloride (Sodium Chloride 0.45%)  1,000 mls @ 100 mls/hr IV ASDIRECTED 

[facility]


   Stop: 12/13/21 22:14


   Last Admin: 12/13/21 16:11 Dose:  100 mls/hr


   Documented by: 


Potassium Chloride/Sodium Chloride (Normal Saline With 40 Meq Kcl)  1,000 mls @ 

150 mls/hr IV ASDIRECTED [facility]


   Stop: 12/14/21 20:09


Potassium Chloride/Sodium Chloride (Normal Saline With 40 Meq Kcl)  1,000 mls @ 

150 mls/hr IV ASDIRECTED [facility]


   Stop: 12/14/21 21:39


   Last Admin: 12/14/21 16:02 Dose:  150 mls/hr


   Documented by: 


Dextrose/Water (Dextrose 5% In Water)  1,000 mls @ 125 mls/hr IV ASDIRECTED [facility]


   Stop: 12/16/21 18:45


   Last Admin: 12/16/21 14:36 Dose:  125 mls/hr


   Documented by: 


Insulin Aspart (Insulin Aspart 100 Units/Ml 3 Ml Pen)  0 unit SUBCUT TIDAC [facility]; 

Protocol


   Last Admin: 12/12/21 17:13 Dose:  Not Given


   Documented by: 


Insulin Human Regular (Insulin Regular, Human 100 Units/Ml 10 Ml Vial)  10 unit 

IVPUSH ONETIME ONE; Protocol


   Stop: 12/09/21 19:39


   Last Admin: 12/09/21 20:34 Dose:  10 unit


   Documented by: 


Midazolam HCl (Midazolam 1 Mg/Ml 2 Ml Sdv)  0.5 mg IVPUSH ONETIME ONE


   Stop: 12/11/21 12:39


   Last Admin: 12/11/21 14:25 Dose:  0.5 mg


   Documented by: 


Midazolam HCl (Midazolam 1 Mg/Ml 2 Ml Sdv) Confirm Administered Dose 2 mg .ROUTE

.STK-MED ONE


   Stop: 12/11/21 12:41


   Last Admin: 12/11/21 17:39 Dose:  Not Given


   Documented by: 


Midazolam HCl (Midazolam 1 Mg/Ml 2 Ml Sdv)  0.5 mg IVPUSH ONETIME ONE


   Stop: 12/11/21 15:29


   Last Admin: 12/11/21 17:37 Dose:  0.5 mg


   Documented by: 


Midazolam HCl (Midazolam 1 Mg/Ml 2 Ml Sdv) Confirm Administered Dose 2 mg .ROUTE

.STK-MED ONE


   Stop: 12/11/21 15:35


   Last Admin: 12/11/21 17:39 Dose:  Not Given


   Documented by: 


Potassium Chloride (Potassium Chloride 10% 20 Meq/15 Ml Soln 30 Ml Ud Cup)  40 

meq PO ONETIME ONE


   Stop: 12/14/21 14:31


   Last Admin: 12/14/21 14:54 Dose:  Not Given


   Documented by: 


Potassium Chloride (Potassium Chloride 20 Meq Tab.Er)  40 meq PO ONETIME ONE


   Stop: 12/16/21 13:39


   Last Admin: 12/16/21 14:36 Dose:  40 meq


   Documented by: 


Vancomycin HCl (Pharmacy To Dose - Vancomycin)  1 dose .XX ONETIME ONE


   Stop: 12/09/21 17:28


   Last Admin: 12/09/21 17:46 Dose:  Not Given


   Documented by: 


Vancomycin HCl (Pharmacy To Dose - Vancomycin)  1 dose .XX ASDIRECTED MANE











- Exam


Central Line Total Time: 5Days  5Hours


General: Alert, Oriented


Neck: Supple


Lungs: Clear to Auscultation, Normal Respiratory Effort


Cardiovascular: Regular Rate, Regular Rhythm


GI/Abdominal Exam: Soft, Non-Tender, No Distention


Extremities: Other (erythema over right knee, no effusion)


Neurological: No New Focal Deficit





- Patient Data


Lab Results Last 24 hrs: 


                         Laboratory Results - last 24 hr











  12/17/21 12/18/21 12/18/21 Range/Units





  17:51 05:25 10:55 


 


WBC    7.75  (4.0-11.0)  K/uL


 


RBC    3.02 L  (4.30-5.90)  M/uL


 


Hgb    9.0 L  (12.0-16.0)  g/dL


 


Hct    27.0 L  (36.0-46.0)  %


 


MCV    89.4  (80.0-98.0)  fL


 


MCH    29.8  (27.0-32.0)  pg


 


MCHC    33.3  (31.0-37.0)  g/dL


 


RDW Std Deviation    51.7  (28.0-62.0)  fl


 


RDW Coeff of Jason    16 H  (11.0-15.0)  %


 


Plt Count    180  (150-400)  K/uL


 


MPV    10.50  (7.40-12.00)  fL


 


Add Manual Diff    YES  


 


Neutrophils % (Manual)    63  (48.0-80.0)  %


 


Band Neutrophils %    13  %


 


Lymphocytes % (Manual)    19  (16.0-40.0)  %


 


Monocytes % (Manual)    1  (0.0-15.0)  %


 


Eosinophils % (Manual)    2  (0.0-7.0)  %


 


Basophils % (Manual)    1  (0.0-1.5)  %


 


Myelocytes %    1  %


 


Nucleated RBC %    0.0  /100WBC


 


Absolute Seg Neuts    4.9  (1.4-5.7)  


 


Band Neutrophils #    1.0  


 


Lymphocytes # (Manual)    1.5  (0.6-2.4)  


 


Monocytes # (Manual)    0.1  (0.0-0.8)  


 


Eosinophils # (Manual)    0.2  (0.0-0.7)  


 


Basophils # (Manual)    0.1  (0.0-0.1)  


 


Absolute Myelocytes    0.1  


 


Nucleated RBCs #    0  K/uL


 


Sodium     (136-145)  mmol/L


 


Potassium     (3.5-5.1)  mmol/L


 


Chloride     ()  mmol/L


 


Carbon Dioxide     (21.0-32.0)  mmol/L


 


BUN     (7.0-18.0)  mg/dL


 


Creatinine     (0.6-1.0)  mg/dL


 


Est Cr Clr Drug Dosing     mL/min


 


Estimated GFR (MDRD)     ml/min


 


Glucose     ()  mg/dL


 


POC Glucose  142 H  97   (70-99)  mg/dL


 


Calcium     (8.5-10.1)  mg/dL














  12/18/21 12/18/21 Range/Units





  10:55 13:26 


 


WBC    (4.0-11.0)  K/uL


 


RBC    (4.30-5.90)  M/uL


 


Hgb    (12.0-16.0)  g/dL


 


Hct    (36.0-46.0)  %


 


MCV    (80.0-98.0)  fL


 


MCH    (27.0-32.0)  pg


 


MCHC    (31.0-37.0)  g/dL


 


RDW Std Deviation    (28.0-62.0)  fl


 


RDW Coeff of Jason    (11.0-15.0)  %


 


Plt Count    (150-400)  K/uL


 


MPV    (7.40-12.00)  fL


 


Add Manual Diff    


 


Neutrophils % (Manual)    (48.0-80.0)  %


 


Band Neutrophils %    %


 


Lymphocytes % (Manual)    (16.0-40.0)  %


 


Monocytes % (Manual)    (0.0-15.0)  %


 


Eosinophils % (Manual)    (0.0-7.0)  %


 


Basophils % (Manual)    (0.0-1.5)  %


 


Myelocytes %    %


 


Nucleated RBC %    /100WBC


 


Absolute Seg Neuts    (1.4-5.7)  


 


Band Neutrophils #    


 


Lymphocytes # (Manual)    (0.6-2.4)  


 


Monocytes # (Manual)    (0.0-0.8)  


 


Eosinophils # (Manual)    (0.0-0.7)  


 


Basophils # (Manual)    (0.0-0.1)  


 


Absolute Myelocytes    


 


Nucleated RBCs #    K/uL


 


Sodium  141   (136-145)  mmol/L


 


Potassium  3.4 L   (3.5-5.1)  mmol/L


 


Chloride  110 H   ()  mmol/L


 


Carbon Dioxide  20.2 L   (21.0-32.0)  mmol/L


 


BUN  22 H   (7.0-18.0)  mg/dL


 


Creatinine  1.3 H   (0.6-1.0)  mg/dL


 


Est Cr Clr Drug Dosing  23.55   mL/min


 


Estimated GFR (MDRD)  39.1   ml/min


 


Glucose  160 H   ()  mg/dL


 


POC Glucose   132 H  (70-99)  mg/dL


 


Calcium  8.0 L   (8.5-10.1)  mg/dL











Result Diagrams: 


                                 12/18/21 10:55





                                 12/18/21 10:55


[facility] Results Last 24 hrs: 


                                  Microbiology











 12/17/21 14:15 C. difficile Antigen & Toxins A,B - Final





 Stool / Feces 


 


 12/14/21 14:57 Aerobic Blood Culture - Preliminary





 Blood - Venous - Lab Draw    NO GROWTH AFTER 3 DAYS





 Anaerobic Blood Culture - Final


 


 12/14/21 14:27 Aerobic Blood Culture - Preliminary





 Blood - Venous    NO GROWTH AFTER 3 DAYS





 Anaerobic Blood Culture - Preliminary





    NO GROWTH AFTER 3 DAYS














Sepsis Event Note





- Evaluation


Sepsis Screening Result: No Definite Risk





- Focused Exam


Vital Signs: 


                                   Vital Signs











  Temp Pulse Resp BP Pulse Ox


 


 12/18/21 10:30   90  18  141/63 H  96


 


 12/18/21 04:00  36.6 C  90  20  142/65 H  97














- Problem List & Annotations


(1) Hyperglycemia


SNOMED Code(s): 61023881


   Code(s): R73.9 - HYPERGLYCEMIA, UNSPECIFIED   Status: Acute   Current Visit: 

Yes   





(2) Sepsis


SNOMED Code(s): 22060418


   Code(s): A41.9 - SEPSIS, UNSPECIFIED ORGANISM   Status: Acute   Current 

Visit: Yes   





(3) Acute on chronic renal insufficiency


SNOMED Code(s): 106310575


   Code(s): N28.9 - DISORDER OF KIDNEY AND URETER, UNSPECIFIED; N18.9 - CHRONIC 

KIDNEY DISEASE, UNSPECIFIED   Status: Acute   Current Visit: Yes   





(4) Cellulitis


SNOMED Code(s): 377474278


   Code(s): L03.90 - CELLULITIS, UNSPECIFIED   Status: Acute   Current Visit: 

Yes   





- Problem List Review


Problem List Initiated/Reviewed/Updated: Yes





- My Orders


Last 24 Hours: 


My Active Orders





12/19/21 05:11


BASIC METABOLIC PANEL,BMP [CHEM] AM 


CBC WITH AUTO DIFF [HEME] AM 





12/20/21 05:11


BASIC METABOLIC PANEL,BMP [CHEM] AM 


CBC WITH AUTO DIFF [HEME] AM 














- Plan


Plan:: 





82 yo female admitted for sepsis from lower extremity cellulitis


Staph aureus Cellulitis: continue clindamycin, on Levaquin for possible 

pneumonia


Hyperglycemia: continue ssi

## 2021-12-19 NOTE — PCM.PN
- General Info


Date of Service: 12/19/21





- Review of Systems


Systems Review Comment:: 





nonverbal





- Patient Data


Vitals - Most Recent: 


                                Last Vital Signs











Temp  36.2 C   12/19/21 12:44


 


Pulse  90   12/19/21 12:44


 


Resp  19   12/19/21 12:44


 


BP  120/46 L  12/19/21 13:25


 


Pulse Ox  99   12/19/21 12:44











Weight - Most Recent: 70.5 kg


Lab Results Last 24 Hours: 


                         Laboratory Results - last 24 hr











  12/18/21 12/18/21 12/19/21 Range/Units





  18:02 21:29 06:59 


 


WBC     (4.0-11.0)  K/uL


 


RBC     (4.30-5.90)  M/uL


 


Hgb     (12.0-16.0)  g/dL


 


Hct     (36.0-46.0)  %


 


MCV     (80.0-98.0)  fL


 


MCH     (27.0-32.0)  pg


 


MCHC     (31.0-37.0)  g/dL


 


RDW Std Deviation     (28.0-62.0)  fl


 


RDW Coeff of Jason     (11.0-15.0)  %


 


Plt Count     (150-400)  K/uL


 


MPV     (7.40-12.00)  fL


 


Add Manual Diff     


 


Neutrophils % (Manual)     (48.0-80.0)  %


 


Band Neutrophils %     %


 


Lymphocytes % (Manual)     (16.0-40.0)  %


 


Monocytes % (Manual)     (0.0-15.0)  %


 


Eosinophils % (Manual)     (0.0-7.0)  %


 


Metamyelocytes %     %


 


Myelocytes %     %


 


Nucleated RBC %     /100WBC


 


Absolute Seg Neuts     (1.4-5.7)  


 


Band Neutrophils #     


 


Lymphocytes # (Manual)     (0.6-2.4)  


 


Monocytes # (Manual)     (0.0-0.8)  


 


Eosinophils # (Manual)     (0.0-0.7)  


 


Absolute Metamyelocyte     


 


Absolute Myelocytes     


 


Nucleated RBCs #     K/uL


 


Sodium     (136-145)  mmol/L


 


Potassium     (3.5-5.1)  mmol/L


 


Chloride     ()  mmol/L


 


Carbon Dioxide     (21.0-32.0)  mmol/L


 


BUN     (7.0-18.0)  mg/dL


 


Creatinine     (0.6-1.0)  mg/dL


 


Est Cr Clr Drug Dosing     mL/min


 


Estimated GFR (MDRD)     ml/min


 


Glucose     ()  mg/dL


 


POC Glucose  100 H  98  71  (70-99)  mg/dL


 


Calcium     (8.5-10.1)  mg/dL














  12/19/21 12/19/21 12/19/21 Range/Units





  07:30 08:09 13:11 


 


WBC   7.70   (4.0-11.0)  K/uL


 


RBC   3.09 L   (4.30-5.90)  M/uL


 


Hgb   9.1 L   (12.0-16.0)  g/dL


 


Hct   27.4 L   (36.0-46.0)  %


 


MCV   88.7   (80.0-98.0)  fL


 


MCH   29.4   (27.0-32.0)  pg


 


MCHC   33.2   (31.0-37.0)  g/dL


 


RDW Std Deviation   51.5   (28.0-62.0)  fl


 


RDW Coeff of Jason   16 H   (11.0-15.0)  %


 


Plt Count   172   (150-400)  K/uL


 


MPV   10.70   (7.40-12.00)  fL


 


Add Manual Diff   YES   


 


Neutrophils % (Manual)   60   (48.0-80.0)  %


 


Band Neutrophils %   15   %


 


Lymphocytes % (Manual)   15 L   (16.0-40.0)  %


 


Monocytes % (Manual)   6   (0.0-15.0)  %


 


Eosinophils % (Manual)   2   (0.0-7.0)  %


 


Metamyelocytes %   1   %


 


Myelocytes %   1   %


 


Nucleated RBC %   0.0   /100WBC


 


Absolute Seg Neuts   4.6   (1.4-5.7)  


 


Band Neutrophils #   1.2   


 


Lymphocytes # (Manual)   1.2   (0.6-2.4)  


 


Monocytes # (Manual)   0.5   (0.0-0.8)  


 


Eosinophils # (Manual)   0.2   (0.0-0.7)  


 


Absolute Metamyelocyte   0.1   


 


Absolute Myelocytes   0.1   


 


Nucleated RBCs #   0   K/uL


 


Sodium  141    (136-145)  mmol/L


 


Potassium  3.8    (3.5-5.1)  mmol/L


 


Chloride  107    ()  mmol/L


 


Carbon Dioxide  18.6 L    (21.0-32.0)  mmol/L


 


BUN  18    (7.0-18.0)  mg/dL


 


Creatinine  1.3 H    (0.6-1.0)  mg/dL


 


Est Cr Clr Drug Dosing  23.55    mL/min


 


Estimated GFR (MDRD)  39.1    ml/min


 


Glucose  93    ()  mg/dL


 


POC Glucose    103 H  (70-99)  mg/dL


 


Calcium  8.1 L    (8.5-10.1)  mg/dL











[facility] Results Last 24 Hours: 


                                  Microbiology











 12/14/21 14:57 Aerobic Blood Culture - Preliminary





 Blood - Venous - Lab Draw    NO GROWTH AFTER 4 DAYS





 Anaerobic Blood Culture - Final


 


 12/14/21 14:27 Aerobic Blood Culture - Preliminary





 Blood - Venous    NO GROWTH AFTER 4 DAYS





 Anaerobic Blood Culture - Preliminary





    NO GROWTH AFTER 4 DAYS











Med Orders - Current: 


                               Current Medications





Acetaminophen (Acetaminophen 325 Mg Tab)  650 mg PO Q6H PRN


   PRN Reason: Pain/Fever


Dextrose/Water (50% Dextrose In Water 50 Ml Syringe)  50 ml IVPUSH ASDIRECTED 

PRN


   PRN Reason: Hypoglycemia


Dextrose/Water (50% Dextrose In Water 50 Ml Syringe)  50 ml IVPUSH ASDIRECTED 

PRN


   PRN Reason: Hypoglycemia


Enoxaparin Sodium (Enoxaparin 30 Mg/0.3 Ml Syringe)  30 mg SUBCUT Q24H [facility]


   Last Admin: 12/18/21 23:39 Dose:  30 mg


   Documented by: 


Glucagon (Glucagon,Human Recombinant 1 Mg Vial)  1 mg IM ASDIRECTED PRN


   PRN Reason: Hypoglycemia


Glucagon (Glucagon,Human Recombinant 1 Mg Vial)  1 mg IM ASDIRECTED PRN


   PRN Reason: Hypoglycemia


Clindamycin Phosphate 600 mg/ (Premix)  50 mls @ 100 mls/hr IV Q6H [facility]


   Last Admin: 12/19/21 13:12 Dose:  100 mls/hr


   Documented by: 


Levofloxacin/Dextrose 750 mg/ (Premix)  150 mls @ 100 mls/hr IV Q48H [facility]


   Last Admin: 12/18/21 09:33 Dose:  100 mls/hr


   Documented by: 


Insulin Aspart (Insulin Aspart 100 Units/Ml 3 Ml Pen)  0 unit SUBCUT QIDACANDBED

[facility]; Protocol


   Last Admin: 12/19/21 13:46 Dose:  Not Given


   Documented by: 


Nystatin (Nystatin Topical Powder 15 Gm Bottle)  0 gm TOP BID [facility]


   Last Admin: 12/19/21 10:19 Dose:  1 applic


   Documented by: 


Oxycodone HCl (Oxycodone 5 Mg Tab)  5 mg PO Q4H PRN


   PRN Reason: Pain


   Last Admin: 12/10/21 18:25 Dose:  5 mg


   Documented by: 





Discontinued Medications





Enoxaparin Sodium (Enoxaparin 40 Mg/0.4 Ml Syringe)  40 mg SUBCUT Q24H [facility]


   Last Admin: 12/10/21 00:27 Dose:  Not Given


   Documented by: 


Fentanyl (Fentanyl 100 Mcg/2 Ml Sdv)  25 mcg IVPUSH ONETIME ONE


   Stop: 12/10/21 05:58


   Last Admin: 12/10/21 06:04 Dose:  25 mcg


   Documented by: 


Vancomycin HCl 1.25 gm/ Premix  250 mls @ 166.667 mls/hr IV ONETIME ONE


   Stop: 12/09/21 19:14


   Last Admin: 12/09/21 18:51 Dose:  166.667 mls/hr


   Documented by: 


Sodium Chloride (Normal Saline)  1,000 mls @ 1,000 mls/hr IV .Bolus ONE


   Stop: 12/09/21 19:25


   Last Admin: 12/09/21 18:51 Dose:  1,000 mls/hr


   Documented by: 


Clindamycin Phosphate 600 mg/ (Sodium Chloride)  54 mls @ 100 mls/hr IV ONETIME 

ONE


   Stop: 12/09/21 20:09


   Last Admin: 12/09/21 21:30 Dose:  Not Given


   Documented by: 


Lactated Ringer's (Ringers, Lactated)  1,000 mls @ 999 mls/hr IV .BOLUS ONE


   Stop: 12/09/21 20:59


   Last Admin: 12/09/21 20:38 Dose:  999 mls/hr


   Documented by: 


Insulin Regular in 0.9 % NACL (Myxredlin In Ns 100 Unit/100 Ml)  100 mls @ 4 

mls/hr IV TITRATE [facility]; Protocol


   Last Titration: 12/12/21 11:10 Dose:  0 mls/hr, 0 mls/hr


   Documented by: 


Piperacillin Sod/Tazobactam (Sod 3.375 gm/ Sodium Chloride)  50 mls @ 100 mls/hr

IV Q6H [facility]


Vancomycin HCl 1 gm/ Sodium (Chloride)  250 mls @ 166.667 mls/hr IV BEDTIME [facility]


   Stop: 12/11/21 22:29


   Last Admin: 12/11/21 20:59 Dose:  166.667 mls/hr


   Documented by: 


Vancomycin HCl 1 gm/ Sodium (Chloride)  250 mls @ 166.667 mls/hr IV DAILY [facility]


   Stop: 12/13/21 10:29


   Last Admin: 12/13/21 08:40 Dose:  166.667 mls/hr


   Documented by: 


Sodium Chloride (Sodium Chloride 0.45%)  1,000 mls @ 100 mls/hr IV ONETIME ONE


   Stop: 12/13/21 10:38


   Last Admin: 12/13/21 00:53 Dose:  100 mls/hr


   Documented by: 


Sodium Chloride (Sodium Chloride 0.45%)  1,000 mls @ 100 mls/hr IV ASDIRECTED 

[facility]


   Stop: 12/13/21 22:14


   Last Admin: 12/13/21 16:11 Dose:  100 mls/hr


   Documented by: 


Potassium Chloride/Sodium Chloride (Normal Saline With 40 Meq Kcl)  1,000 mls @ 

150 mls/hr IV ASDIRECTED [facility]


   Stop: 12/14/21 20:09


Potassium Chloride/Sodium Chloride (Normal Saline With 40 Meq Kcl)  1,000 mls @ 

150 mls/hr IV ASDIRECTED [facility]


   Stop: 12/14/21 21:39


   Last Admin: 12/14/21 16:02 Dose:  150 mls/hr


   Documented by: 


Dextrose/Water (Dextrose 5% In Water)  1,000 mls @ 125 mls/hr IV ASDIRECTED [facility]


   Stop: 12/16/21 18:45


   Last Admin: 12/16/21 14:36 Dose:  125 mls/hr


   Documented by: 


Insulin Aspart (Insulin Aspart 100 Units/Ml 3 Ml Pen)  [facility] 

[facility]


   Last Admin: 12/12/21 17:13 Dose:  Not Given


   Documented by: 


Insulin Human Regular (Insulin Regular, Human 100 Units/Ml 10 Ml Vial)  10 unit 

IVPUSH ONETIME ONE; Protocol


   Stop: 12/09/21 19:39


   Last Admin: 12/09/21 20:34 Dose:  10 unit


   Documented by: 


Midazolam HCl (Midazolam 1 Mg/Ml 2 Ml Sdv)  0.5 mg IVPUSH ONETIME ONE


   Stop: 12/11/21 12:39


   Last Admin: 12/11/21 14:25 Dose:  0.5 mg


   Documented by: 


Midazolam HCl (Midazolam 1 Mg/Ml 2 Ml Sdv) Confirm Administered Dose 2 mg .ROUTE

.STK-MED ONE


   Stop: 12/11/21 12:41


   Last Admin: 12/11/21 17:39 Dose:  Not Given


   Documented by: 


Midazolam HCl (Midazolam 1 Mg/Ml 2 Ml Sdv)  0.5 mg IVPUSH ONETIME ONE


   Stop: 12/11/21 15:29


   Last Admin: 12/11/21 17:37 Dose:  0.5 mg


   Documented by: 


Midazolam HCl (Midazolam 1 Mg/Ml 2 Ml Sdv) Confirm Administered Dose 2 mg .ROUTE

.STK-MED ONE


   Stop: 12/11/21 15:35


   Last Admin: 12/11/21 17:39 Dose:  Not Given


   Documented by: 


Potassium Chloride (Potassium Chloride 10% 20 Meq/15 Ml Soln 30 Ml Ud Cup)  40 

meq PO ONETIME ONE


   Stop: 12/14/21 14:31


   Last Admin: 12/14/21 14:54 Dose:  Not Given


   Documented by: 


Potassium Chloride (Potassium Chloride 20 Meq Tab.Er)  40 meq PO ONETIME ONE


   Stop: 12/16/21 13:39


   Last Admin: 12/16/21 14:36 Dose:  40 meq


   Documented by: 


Vancomycin HCl (Pharmacy To Dose - Vancomycin)  1 dose .XX ONETIME ONE


   Stop: 12/09/21 17:28


   Last Admin: 12/09/21 17:46 Dose:  Not Given


   Documented by: 


Vancomycin HCl (Pharmacy To Dose - Vancomycin)  1 dose .XX ASDIRECTED [facility]











- Exam


Central Line Total Time: 5Days  5Hours


General: No Acute Distress


Lungs: Clear to Auscultation, Normal Respiratory Effort


Cardiovascular: Regular Rate, Regular Rhythm


GI/Abdominal Exam: Soft, Non-Tender, No Distention


Extremities: Other (erythema over knee improving)


Neurological: No New Focal Deficit





- Patient Data


Lab Results Last 24 hrs: 


                         Laboratory Results - last 24 hr











  12/18/21 12/18/21 12/19/21 Range/Units





  18:02 21:29 06:59 


 


WBC     (4.0-11.0)  K/uL


 


RBC     (4.30-5.90)  M/uL


 


Hgb     (12.0-16.0)  g/dL


 


Hct     (36.0-46.0)  %


 


MCV     (80.0-98.0)  fL


 


MCH     (27.0-32.0)  pg


 


MCHC     (31.0-37.0)  g/dL


 


RDW Std Deviation     (28.0-62.0)  fl


 


RDW Coeff of Jason     (11.0-15.0)  %


 


Plt Count     (150-400)  K/uL


 


MPV     (7.40-12.00)  fL


 


Add Manual Diff     


 


Neutrophils % (Manual)     (48.0-80.0)  %


 


Band Neutrophils %     %


 


Lymphocytes % (Manual)     (16.0-40.0)  %


 


Monocytes % (Manual)     (0.0-15.0)  %


 


Eosinophils % (Manual)     (0.0-7.0)  %


 


Metamyelocytes %     %


 


Myelocytes %     %


 


Nucleated RBC %     /100WBC


 


Absolute Seg Neuts     (1.4-5.7)  


 


Band Neutrophils #     


 


Lymphocytes # (Manual)     (0.6-2.4)  


 


Monocytes # (Manual)     (0.0-0.8)  


 


Eosinophils # (Manual)     (0.0-0.7)  


 


Absolute Metamyelocyte     


 


Absolute Myelocytes     


 


Nucleated RBCs #     K/uL


 


Sodium     (136-145)  mmol/L


 


Potassium     (3.5-5.1)  mmol/L


 


Chloride     ()  mmol/L


 


Carbon Dioxide     (21.0-32.0)  mmol/L


 


BUN     (7.0-18.0)  mg/dL


 


Creatinine     (0.6-1.0)  mg/dL


 


Est Cr Clr Drug Dosing     mL/min


 


Estimated GFR (MDRD)     ml/min


 


Glucose     ()  mg/dL


 


POC Glucose  100 H  98  71  (70-99)  mg/dL


 


Calcium     (8.5-10.1)  mg/dL














  12/19/21 12/19/21 12/19/21 Range/Units





  07:30 08:09 13:11 


 


WBC   7.70   (4.0-11.0)  K/uL


 


RBC   3.09 L   (4.30-5.90)  M/uL


 


Hgb   9.1 L   (12.0-16.0)  g/dL


 


Hct   27.4 L   (36.0-46.0)  %


 


MCV   88.7   (80.0-98.0)  fL


 


MCH   29.4   (27.0-32.0)  pg


 


MCHC   33.2   (31.0-37.0)  g/dL


 


RDW Std Deviation   51.5   (28.0-62.0)  fl


 


RDW Coeff of Jason   16 H   (11.0-15.0)  %


 


Plt Count   172   (150-400)  K/uL


 


MPV   10.70   (7.40-12.00)  fL


 


Add Manual Diff   YES   


 


Neutrophils % (Manual)   60   (48.0-80.0)  %


 


Band Neutrophils %   15   %


 


Lymphocytes % (Manual)   15 L   (16.0-40.0)  %


 


Monocytes % (Manual)   6   (0.0-15.0)  %


 


Eosinophils % (Manual)   2   (0.0-7.0)  %


 


Metamyelocytes %   1   %


 


Myelocytes %   1   %


 


Nucleated RBC %   0.0   /100WBC


 


Absolute Seg Neuts   4.6   (1.4-5.7)  


 


Band Neutrophils #   1.2   


 


Lymphocytes # (Manual)   1.2   (0.6-2.4)  


 


Monocytes # (Manual)   0.5   (0.0-0.8)  


 


Eosinophils # (Manual)   0.2   (0.0-0.7)  


 


Absolute Metamyelocyte   0.1   


 


Absolute Myelocytes   0.1   


 


Nucleated RBCs #   0   K/uL


 


Sodium  141    (136-145)  mmol/L


 


Potassium  3.8    (3.5-5.1)  mmol/L


 


Chloride  107    ()  mmol/L


 


Carbon Dioxide  18.6 L    (21.0-32.0)  mmol/L


 


BUN  18    (7.0-18.0)  mg/dL


 


Creatinine  1.3 H    (0.6-1.0)  mg/dL


 


Est Cr Clr Drug Dosing  23.55    mL/min


 


Estimated GFR (MDRD)  39.1    ml/min


 


Glucose  93    ()  mg/dL


 


POC Glucose    103 H  (70-99)  mg/dL


 


Calcium  8.1 L    (8.5-10.1)  mg/dL











Result Diagrams: 


                                 12/19/21 08:09





                                 12/19/21 07:30


[facility] Results Last 24 hrs: 


                                  Microbiology











 12/14/21 14:57 Aerobic Blood Culture - Preliminary





 Blood - Venous - Lab Draw    NO GROWTH AFTER 4 DAYS





 Anaerobic Blood Culture - Final


 


 12/14/21 14:27 Aerobic Blood Culture - Preliminary





 Blood - Venous    NO GROWTH AFTER 4 DAYS





 Anaerobic Blood Culture - Preliminary





    NO GROWTH AFTER 4 DAYS














Sepsis Event Note





- Evaluation


Sepsis Screening Result: No Definite Risk





- Focused Exam


Vital Signs: 


                                   Vital Signs











  Temp Pulse Resp BP Pulse Ox


 


 12/19/21 13:25     120/46 L 


 


 12/19/21 13:13     116/41 L 


 


 12/19/21 12:44  36.2 C  90  19  93/52 L  99


 


 12/19/21 09:00   86  18  152/98 H  96


 


 12/19/21 04:00  36.3 C  92  16  137/59 L  99














- Problem List & Annotations


(1) Hyperglycemia


SNOMED Code(s): 13396896


   Code(s): R73.9 - HYPERGLYCEMIA, UNSPECIFIED   Status: Acute   Current Visit: 

Yes   





(2) Sepsis


SNOMED Code(s): 07841728


   Code(s): A41.9 - SEPSIS, UNSPECIFIED ORGANISM   Status: Acute   Current V

isit: Yes   





(3) Acute on chronic renal insufficiency


SNOMED Code(s): 897755940


   Code(s): N28.9 - DISORDER OF KIDNEY AND URETER, UNSPECIFIED; N18.9 - CHRONIC 

KIDNEY DISEASE, UNSPECIFIED   Status: Acute   Current Visit: Yes   





(4) Cellulitis


SNOMED Code(s): 707484189


   Code(s): L03.90 - CELLULITIS, UNSPECIFIED   Status: Acute   Current Visit: 

Yes   





- Problem List Review


Problem List Initiated/Reviewed/Updated: Yes





- My Orders


Last 24 Hours: 


My Active Orders





12/20/21 05:11


BASIC METABOLIC PANEL,BMP [CHEM] AM 


CBC WITH AUTO DIFF [HEME] AM 














- Plan


Plan:: 





82 yo female admitted for sepsis from lower extremity cellulitis


Staph aureus Cellulitis: continue clindamycin, Levaquin


DM: continue ssi


Dispo: likely back to [facility] tomorrow.

## 2021-12-20 NOTE — PCM.PN
- General Info


Date of Service: 12/20/21


Subjective Update: 





Pt is afebrile. She is demented and does not know where she is at. 





- Patient Data


Vitals - Most Recent: 


                                Last Vital Signs











Temp  96.9 F   12/20/21 15:05


 


Pulse  88   12/20/21 15:05


 


Resp  20   12/20/21 15:05


 


BP  137/49 L  12/20/21 15:05


 


Pulse Ox  96   12/20/21 15:05











Weight - Most Recent: 155 lb 6.814 oz


I&O - Last 24 Hours: 


                                 Intake & Output











 12/20/21 12/20/21 12/20/21





 06:59 14:59 22:59


 


Intake Total 100  


 


Balance 100  











Lab Results Last 24 Hours: 


                         Laboratory Results - last 24 hr











  12/19/21 12/19/21 12/20/21 Range/Units





  17:48 20:38 05:37 


 


WBC    7.69  (4.0-11.0)  K/uL


 


RBC    3.12 L  (4.30-5.90)  M/uL


 


Hgb    9.3 L  (12.0-16.0)  g/dL


 


Hct    27.5 L  (36.0-46.0)  %


 


MCV    88.1  (80.0-98.0)  fL


 


MCH    29.8  (27.0-32.0)  pg


 


MCHC    33.8  (31.0-37.0)  g/dL


 


RDW Std Deviation    51.7  (28.0-62.0)  fl


 


RDW Coeff of Jason    16 H  (11.0-15.0)  %


 


Plt Count    200  (150-400)  K/uL


 


MPV    10.00  (7.40-12.00)  fL


 


Add Manual Diff    YES  


 


Neutrophils % (Manual)    63  (48.0-80.0)  %


 


Band Neutrophils %    5  %


 


Lymphocytes % (Manual)    22  (16.0-40.0)  %


 


Monocytes % (Manual)    4  (0.0-15.0)  %


 


Eosinophils % (Manual)    4  (0.0-7.0)  %


 


Metamyelocytes %    1  %


 


Myelocytes %    1  %


 


Nucleated RBC %    0.2  /100WBC


 


Absolute Seg Neuts    4.8  (1.4-5.7)  


 


Band Neutrophils #    0.4  


 


Lymphocytes # (Manual)    1.7  (0.6-2.4)  


 


Monocytes # (Manual)    0.3  (0.0-0.8)  


 


Eosinophils # (Manual)    0.3  (0.0-0.7)  


 


Absolute Metamyelocyte    0.1  


 


Absolute Myelocytes    0.1  


 


Nucleated RBCs #    0  K/uL


 


Sodium     (136-145)  mmol/L


 


Potassium     (3.5-5.1)  mmol/L


 


Chloride     ()  mmol/L


 


Carbon Dioxide     (21.0-32.0)  mmol/L


 


BUN     (7.0-18.0)  mg/dL


 


Creatinine     (0.6-1.0)  mg/dL


 


Est Cr Clr Drug Dosing     mL/min


 


Estimated GFR (MDRD)     ml/min


 


Glucose     ()  mg/dL


 


POC Glucose  113 H  164 H   (70-99)  mg/dL


 


Calcium     (8.5-10.1)  mg/dL














  12/20/21 12/20/21 12/20/21 Range/Units





  05:37 06:28 11:57 


 


WBC     (4.0-11.0)  K/uL


 


RBC     (4.30-5.90)  M/uL


 


Hgb     (12.0-16.0)  g/dL


 


Hct     (36.0-46.0)  %


 


MCV     (80.0-98.0)  fL


 


MCH     (27.0-32.0)  pg


 


MCHC     (31.0-37.0)  g/dL


 


RDW Std Deviation     (28.0-62.0)  fl


 


RDW Coeff of Jason     (11.0-15.0)  %


 


Plt Count     (150-400)  K/uL


 


MPV     (7.40-12.00)  fL


 


Add Manual Diff     


 


Neutrophils % (Manual)     (48.0-80.0)  %


 


Band Neutrophils %     %


 


Lymphocytes % (Manual)     (16.0-40.0)  %


 


Monocytes % (Manual)     (0.0-15.0)  %


 


Eosinophils % (Manual)     (0.0-7.0)  %


 


Metamyelocytes %     %


 


Myelocytes %     %


 


Nucleated RBC %     /100WBC


 


Absolute Seg Neuts     (1.4-5.7)  


 


Band Neutrophils #     


 


Lymphocytes # (Manual)     (0.6-2.4)  


 


Monocytes # (Manual)     (0.0-0.8)  


 


Eosinophils # (Manual)     (0.0-0.7)  


 


Absolute Metamyelocyte     


 


Absolute Myelocytes     


 


Nucleated RBCs #     K/uL


 


Sodium  137    (136-145)  mmol/L


 


Potassium  3.0 L    (3.5-5.1)  mmol/L


 


Chloride  105    ()  mmol/L


 


Carbon Dioxide  22.4    (21.0-32.0)  mmol/L


 


BUN  14    (7.0-18.0)  mg/dL


 


Creatinine  1.4 H    (0.6-1.0)  mg/dL


 


Est Cr Clr Drug Dosing  21.87    mL/min


 


Estimated GFR (MDRD)  35.9    ml/min


 


Glucose  120 H    ()  mg/dL


 


POC Glucose   100 H  132 H  (70-99)  mg/dL


 


Calcium  8.4 L    (8.5-10.1)  mg/dL











[facility] Results Last 24 Hours: 


                                  Microbiology











 12/14/21 14:57 Aerobic Blood Culture - Final





 Blood - Venous - Lab Draw    NO GROWTH AFTER 5 DAYS





 Anaerobic Blood Culture - Final


 


 12/14/21 14:27 Aerobic Blood Culture - Final





 Blood - Venous    NO GROWTH AFTER 5 DAYS





 Anaerobic Blood Culture - Final





    NO GROWTH AFTER 5 DAYS











Med Orders - Current: 


                               Current Medications





Acetaminophen (Acetaminophen 325 Mg Tab)  650 mg PO Q6H PRN


   PRN Reason: Pain/Fever


Dextrose/Water (50% Dextrose In Water 50 Ml Syringe)  50 ml IVPUSH ASDIRECTED 

PRN


   PRN Reason: Hypoglycemia


Dextrose/Water (50% Dextrose In Water 50 Ml Syringe)  50 ml IVPUSH ASDIRECTED 

PRN


   PRN Reason: Hypoglycemia


Enoxaparin Sodium (Enoxaparin 30 Mg/0.3 Ml Syringe)  30 mg SUBCUT Q24H [facility]


   Last Admin: 12/19/21 22:17 Dose:  30 mg


   Documented by: 


Glucagon (Glucagon,Human Recombinant 1 Mg Vial)  1 mg IM ASDIRECTED PRN


   PRN Reason: Hypoglycemia


Glucagon (Glucagon,Human Recombinant 1 Mg Vial)  1 mg IM ASDIRECTED PRN


   PRN Reason: Hypoglycemia


Clindamycin Phosphate 600 mg/ (Premix)  50 mls @ 100 mls/hr IV Q6H [facility]


   Last Admin: 12/20/21 14:16 Dose:  100 mls/hr


   Documented by: 


Levofloxacin/Dextrose 750 mg/ (Premix)  150 mls @ 100 mls/hr IV Q48H [facility]


   Last Admin: 12/20/21 08:42 Dose:  100 mls/hr


   Documented by: 


Potassium Chloride/Sodium Chloride (Normal Saline With 40 Meq Kcl)  1,000 mls @ 

250 mls/hr IV ASDIRECTED [facility]


   Stop: 12/20/21 17:44


   Last Admin: 12/20/21 15:07 Dose:  250 mls/hr


   Documented by: 


Insulin Aspart (Insulin Aspart 100 Units/Ml 3 Ml Pen)  0 unit SUBCUT QIDACANDBED

[facility]; Protocol


   Last Admin: 12/20/21 11:57 Dose:  Not Given


   Documented by: 


Nystatin (Nystatin Topical Powder 15 Gm Bottle)  0 gm TOP BID [facility]


   Last Admin: 12/20/21 08:46 Dose:  1 applic


   Documented by: 


Oxycodone HCl (Oxycodone 5 Mg Tab)  5 mg PO Q4H PRN


   PRN Reason: Pain


   Last Admin: 12/10/21 18:25 Dose:  5 mg


   Documented by: 


Potassium Chloride (Potassium Chloride 10% 20 Meq/15 Ml Soln 30 Ml Ud Cup)  40 

meq PO DAILY [facility]


   Last Admin: 12/20/21 11:53 Dose:  Not Given


   Documented by: 





Discontinued Medications





Enoxaparin Sodium (Enoxaparin 40 Mg/0.4 Ml Syringe)  40 mg SUBCUT Q24H [facility]


   Last Admin: 12/10/21 00:27 Dose:  Not Given


   Documented by: 


Fentanyl (Fentanyl 100 Mcg/2 Ml Sdv)  25 mcg IVPUSH ONETIME ONE


   Stop: 12/10/21 05:58


   Last Admin: 12/10/21 06:04 Dose:  25 mcg


   Documented by: 


Vancomycin HCl 1.25 gm/ Premix  250 mls @ 166.667 mls/hr IV ONETIME ONE


   Stop: 12/09/21 19:14


   Last Admin: 12/09/21 18:51 Dose:  166.667 mls/hr


   Documented by: 


Sodium Chloride (Normal Saline)  1,000 mls @ 1,000 mls/hr IV .Bolus ONE


   Stop: 12/09/21 19:25


   Last Admin: 12/09/21 18:51 Dose:  1,000 mls/hr


   Documented by: 


Clindamycin Phosphate 600 mg/ (Sodium Chloride)  54 mls @ 100 mls/hr IV ONETIME 

ONE


   Stop: 12/09/21 20:09


   Last Admin: 12/09/21 21:30 Dose:  Not Given


   Documented by: 


Lactated Ringer's (Ringers, Lactated)  1,000 mls @ 999 mls/hr IV .BOLUS ONE


   Stop: 12/09/21 20:59


   Last Admin: 12/09/21 20:38 Dose:  999 mls/hr


   Documented by: 


Insulin Regular in 0.9 % NACL (Myxredlin In Ns 100 Unit/100 Ml)  100 mls @ 4 

mls/hr IV TITRATE [facility]; Protocol


   Last Titration: 12/12/21 11:10 Dose:  0 mls/hr, 0 mls/hr


   Documented by: 


Piperacillin Sod/Tazobactam (Sod 3.375 gm/ Sodium Chloride)  50 mls @ 100 mls/hr

IV Q6H [facility]


Vancomycin HCl 1 gm/ Sodium (Chloride)  250 mls @ 166.667 mls/hr IV BEDTIME [facility]


   Stop: 12/11/21 22:29


   Last Admin: 12/11/21 20:59 Dose:  166.667 mls/hr


   Documented by: 


Vancomycin HCl 1 gm/ Sodium (Chloride)  250 mls @ 166.667 mls/hr IV DAILY [facility]


   Stop: 12/13/21 10:29


   Last Admin: 12/13/21 08:40 Dose:  166.667 mls/hr


   Documented by: 


Sodium Chloride (Sodium Chloride 0.45%)  1,000 mls @ 100 mls/hr IV ONETIME ONE


   Stop: 12/13/21 10:38


   Last Admin: 12/13/21 00:53 Dose:  100 mls/hr


   Documented by: 


Sodium Chloride (Sodium Chloride 0.45%)  1,000 mls @ 100 mls/hr IV ASDIRECTED 

[facility]


   Stop: 12/13/21 22:14


   Last Admin: 12/13/21 16:11 Dose:  100 mls/hr


   Documented by: 


Potassium Chloride/Sodium Chloride (Normal Saline With 40 Meq Kcl)  1,000 mls @ 

150 mls/hr IV ASDIRECTED [facility]


   Stop: 12/14/21 20:09


Potassium Chloride/Sodium Chloride (Normal Saline With 40 Meq Kcl)  1,000 mls @ 

150 mls/hr IV ASDIRECTED [facility]


   Stop: 12/14/21 21:39


   Last Admin: 12/14/21 16:02 Dose:  150 mls/hr


   Documented by: 


Dextrose/Water (Dextrose 5% In Water)  1,000 mls @ 125 mls/hr IV ASDIRECTED [facility]


   Stop: 12/16/21 18:45


   Last Admin: 12/16/21 14:36 Dose:  125 mls/hr


   Documented by: 


Insulin Aspart (Insulin Aspart 100 Units/Ml 3 Ml Pen)  0 unit SUBCUT TIDAC [facility]; 

Protocol


   Last Admin: 12/12/21 17:13 Dose:  Not Given


   Documented by: 


Insulin Human Regular (Insulin Regular, Human 100 Units/Ml 10 Ml Vial)  10 unit 

IVPUSH ONETIME ONE; Protocol


   Stop: 12/09/21 19:39


   Last Admin: 12/09/21 20:34 Dose:  10 unit


   Documented by: 


Midazolam HCl (Midazolam 1 Mg/Ml 2 Ml Sdv)  0.5 mg IVPUSH ONETIME ONE


   Stop: 12/11/21 12:39


   Last Admin: 12/11/21 14:25 Dose:  0.5 mg


   Documented by: 


Midazolam HCl (Midazolam 1 Mg/Ml 2 Ml Sdv) Confirm Administered Dose 2 mg .ROUTE

.STK-MED ONE


   Stop: 12/11/21 12:41


   Last Admin: 12/11/21 17:39 Dose:  Not Given


   Documented by: 


Midazolam HCl (Midazolam 1 Mg/Ml 2 Ml Sdv)  0.5 mg IVPUSH ONETIME ONE


   Stop: 12/11/21 15:29


   Last Admin: 12/11/21 17:37 Dose:  0.5 mg


   Documented by: 


Midazolam HCl (Midazolam 1 Mg/Ml 2 Ml Sdv) Confirm Administered Dose 2 mg .ROUTE

.STK-MED ONE


   Stop: 12/11/21 15:35


   Last Admin: 12/11/21 17:39 Dose:  Not Given


   Documented by: 


Potassium Chloride (Potassium Chloride 10% 20 Meq/15 Ml Soln 30 Ml Ud Cup)  40 

meq PO ONETIME ONE


   Stop: 12/14/21 14:31


   Last Admin: 12/14/21 14:54 Dose:  Not Given


   Documented by: 


Potassium Chloride (Potassium Chloride 20 Meq Tab.Er)  40 meq PO ONETIME ONE


   Stop: 12/16/21 13:39


   Last Admin: 12/16/21 14:36 Dose:  40 meq


   Documented by: 


Vancomycin HCl (Pharmacy To Dose - Vancomycin)  1 dose .XX ONETIME ONE


   Stop: 12/09/21 17:28


   Last Admin: 12/09/21 17:46 Dose:  Not Given


   Documented by: 


Vancomycin HCl (Pharmacy To Dose - Vancomycin)  1 dose .XX ASDIRECTED [facility]











- Exam


Central Line Total Time: 5Days  5Hours


Physical Findings Comments:: 





General:  Elderly female. In no distress


CVS: S1S2 appreciated. RRR


lungs: clear bilaterally


pa: soft, non tender. bowel sounds present 


ext: no clubbing, cyanosis or edema. Erythema on the right leg has resolved. Pt 

has contractures of the lower extremities. 


neuro: moves all extremities. Unable to ambulate. Does not follow commands. 





- Patient Data


Lab Results Last 24 hrs: 


                         Laboratory Results - last 24 hr











  12/19/21 12/19/21 12/20/21 Range/Units





  17:48 20:38 05:37 


 


WBC    7.69  (4.0-11.0)  K/uL


 


RBC    3.12 L  (4.30-5.90)  M/uL


 


Hgb    9.3 L  (12.0-16.0)  g/dL


 


Hct    27.5 L  (36.0-46.0)  %


 


MCV    88.1  (80.0-98.0)  fL


 


MCH    29.8  (27.0-32.0)  pg


 


MCHC    33.8  (31.0-37.0)  g/dL


 


RDW Std Deviation    51.7  (28.0-62.0)  fl


 


RDW Coeff of Jason    16 H  (11.0-15.0)  %


 


Plt Count    200  (150-400)  K/uL


 


MPV    10.00  (7.40-12.00)  fL


 


Add Manual Diff    YES  


 


Neutrophils % (Manual)    63  (48.0-80.0)  %


 


Band Neutrophils %    5  %


 


Lymphocytes % (Manual)    22  (16.0-40.0)  %


 


Monocytes % (Manual)    4  (0.0-15.0)  %


 


Eosinophils % (Manual)    4  (0.0-7.0)  %


 


Metamyelocytes %    1  %


 


Myelocytes %    1  %


 


Nucleated RBC %    0.2  /100WBC


 


Absolute Seg Neuts    4.8  (1.4-5.7)  


 


Band Neutrophils #    0.4  


 


Lymphocytes # (Manual)    1.7  (0.6-2.4)  


 


Monocytes # (Manual)    0.3  (0.0-0.8)  


 


Eosinophils # (Manual)    0.3  (0.0-0.7)  


 


Absolute Metamyelocyte    0.1  


 


Absolute Myelocytes    0.1  


 


Nucleated RBCs #    0  K/uL


 


Sodium     (136-145)  mmol/L


 


Potassium     (3.5-5.1)  mmol/L


 


Chloride     ()  mmol/L


 


Carbon Dioxide     (21.0-32.0)  mmol/L


 


BUN     (7.0-18.0)  mg/dL


 


Creatinine     (0.6-1.0)  mg/dL


 


Est Cr Clr Drug Dosing     mL/min


 


Estimated GFR (MDRD)     ml/min


 


Glucose     ()  mg/dL


 


POC Glucose  113 H  164 H   (70-99)  mg/dL


 


Calcium     (8.5-10.1)  mg/dL














  12/20/21 12/20/21 12/20/21 Range/Units





  05:37 06:28 11:57 


 


WBC     (4.0-11.0)  K/uL


 


RBC     (4.30-5.90)  M/uL


 


Hgb     (12.0-16.0)  g/dL


 


Hct     (36.0-46.0)  %


 


MCV     (80.0-98.0)  fL


 


MCH     (27.0-32.0)  pg


 


MCHC     (31.0-37.0)  g/dL


 


RDW Std Deviation     (28.0-62.0)  fl


 


RDW Coeff of Jason     (11.0-15.0)  %


 


Plt Count     (150-400)  K/uL


 


MPV     (7.40-12.00)  fL


 


Add Manual Diff     


 


Neutrophils % (Manual)     (48.0-80.0)  %


 


Band Neutrophils %     %


 


Lymphocytes % (Manual)     (16.0-40.0)  %


 


Monocytes % (Manual)     (0.0-15.0)  %


 


Eosinophils % (Manual)     (0.0-7.0)  %


 


Metamyelocytes %     %


 


Myelocytes %     %


 


Nucleated RBC %     /100WBC


 


Absolute Seg Neuts     (1.4-5.7)  


 


Band Neutrophils #     


 


Lymphocytes # (Manual)     (0.6-2.4)  


 


Monocytes # (Manual)     (0.0-0.8)  


 


Eosinophils # (Manual)     (0.0-0.7)  


 


Absolute Metamyelocyte     


 


Absolute Myelocytes     


 


Nucleated RBCs #     K/uL


 


Sodium  137    (136-145)  mmol/L


 


Potassium  3.0 L    (3.5-5.1)  mmol/L


 


Chloride  105    ()  mmol/L


 


Carbon Dioxide  22.4    (21.0-32.0)  mmol/L


 


BUN  14    (7.0-18.0)  mg/dL


 


Creatinine  1.4 H    (0.6-1.0)  mg/dL


 


Est Cr Clr Drug Dosing  21.87    mL/min


 


Estimated GFR (MDRD)  35.9    ml/min


 


Glucose  120 H    ()  mg/dL


 


POC Glucose   100 H  132 H  (70-99)  mg/dL


 


Calcium  8.4 L    (8.5-10.1)  mg/dL











Result Diagrams: 


                                 12/20/21 05:37





                                 12/20/21 05:37


[facility] Results Last 24 hrs: 


                                  Microbiology











 12/14/21 14:57 Aerobic Blood Culture - Final





 Blood - Venous - Lab Draw    NO GROWTH AFTER 5 DAYS





 Anaerobic Blood Culture - Final


 


 12/14/21 14:27 Aerobic Blood Culture - Final





 Blood - Venous    NO GROWTH AFTER 5 DAYS





 Anaerobic Blood Culture - Final





    NO GROWTH AFTER 5 DAYS














Sepsis Event Note





- Evaluation


Sepsis Screening Result: No Definite Risk





- Focused Exam


Vital Signs: 


                                   Vital Signs











  Temp Temp Pulse Resp BP Pulse Ox


 


 12/20/21 15:05   96.9 F  88  20  137/49 L  96


 


 12/20/21 08:50   96.3 F L  77  20  118/54 L  94 L


 


 12/20/21 04:45  98.3 F   93  21 H  132/66  96














- Problem List & Annotations


(1) Advanced dementia


SNOMED Code(s): 98387445


   Code(s): F03.90 - UNSPECIFIED DEMENTIA WITHOUT BEHAVIORAL DISTURBANCE   

Status: Acute   Current Visit: Yes   





(2) Cellulitis


SNOMED Code(s): 235693475


   Code(s): L03.90 - CELLULITIS, UNSPECIFIED   Status: Acute   Current Visit: 

Yes   





(3) COVID-19


SNOMED Code(s): 924098835


   Code(s): U07.1 - COVID-19   Status: Acute   Current Visit: No   





(4) Failure to thrive in adult


SNOMED Code(s): 579428152


   Code(s): R62.7 - ADULT FAILURE TO THRIVE   Status: Acute   Current Visit: No 

 





(5) Generalized weakness


SNOMED Code(s): 96952455


   Code(s): R53.1 - WEAKNESS   Status: Acute   Current Visit: No   





- Problem List Review


Problem List Initiated/Reviewed/Updated: Yes





- My Orders


Last 24 Hours: 


My Active Orders





12/20/21 10:38


Discontinue Telemetry Monitoring [Cardiac Monitoring Discontinue] [RC] Click to 

Edit 





12/20/21 11:00


Potassium Chloride   40 meq PO DAILY 





12/20/21 13:45


Sodium Chloride 0.9% with KCl [Normal Saline with 40 mEq KCl] 1,000 ml IV 

ASDIRECTED 














- Plan


Plan:: 





80 yo female admitted for sepsis from lower extremity cellulitis with MSSA. 


  On clindamycin and Levaquin. Infection is almost completely resolved. Will 

switch to oral abx upon DC to the SNF. 








T2DM: continue ssi








Advanced dementia





FTT








Generalized weakness and debility








Severe protein Calorie malnutrition. 








Dispo: likely back to [facility] on hospice in the next 1-2 days. .

## 2021-12-21 NOTE — [id number]
<Jose Antonio Tony - Last Filed: 12/21/21 10:18>





**Discharge Summary





- Hospital Course


Free Text/Narrative:: 





81-year-old female from [facility] admitted for cellulitis.  Patient 

has severe dementia at baseline.  Patient was admitted for left lower extremity 

cellulitis causing sepsis.  She received 3 L normal saline until lactic acid 

levels normalized.  She was treated with vancomycin and clindamycin.  Levaquin 

was added for possible pneumonia.  Femoral line was placed.  Patient's infection

improved and erythema has resolved.  Patient diabetes was managed on a sliding 

scale.  Patient will be discharged on doxy.  It was recommended by the provider 

that the patient be switched to hospice care.





- Discharge Data


Discharge Date: 12/21/21


Discharge Disposition: DC/Tfer to [facility] 03


Condition: Stable





- Referral to Home Health


Primary Care Physician: 


PCP None








- Patient Instructions


Notify Provider of: Fever, Increased Pain, Swelling and Redness





- Discharge Plan


*PRESCRIPTION DRUG MONITORING PROGRAM REVIEWED*: Not Applicable


*COPY OF PRESCRIPTION DRUG MONITORING REPORT IN PATIENT DRAKE: Not Applicable


Prescriptions/Med Rec: 


Insulin Aspart [NovoLOG] See Protocol SQ ACBREAKFAST 30 Days  pen


Doxycycline [Vibra-Tabs] 100 mg PO Q12HR 10 Days #20 tab


Home Medications: 


                                    Home Meds





Clopidogrel Bisulfate [Clopidogrel] 75 mg PO DAILY 05/06/16 [History]


Metoprolol Succinate 25 mg PO DAILY 05/06/16 [History]


Nitroglycerin [Nitrostat] 0.4 mg SL .EVERY 5 MINUTES PRN 05/06/16 [History]


Multivit with Minerals/Lutein [Vision Plus Lutein Vitamin] 1 tab PO DAILY 

11/13/16 [History]


Ferrous Sulfate 325 mg PO TIDMEALS #90 tablet 12/14/16 [Rx]


Bumetanide 1 mg PO DAILY #0 06/06/18 [Rx]


Carbamide Peroxide [Debrox 6.5% Otic Soln] 3 drop EARBOTH DAILY PRN 01/24/20 

[History]


PARoxetine [Paxil] 40 mg PO DAILY 04/07/21 [History]


QUEtiapine [SEROquel] 25 mg PO BID 11/29/21 [History]


Albuterol/Ipratropium [Combivent Respimat] 1 gm INH Q4H PRN  inhaler 12/02/21 

[Rx]


Acetaminophen 500 mg PO Q4H PRN 12/10/21 [History]


Albuterol Sulfate [Albuterol Sulfate Hfa] 2 puff INH Q6H PRN 12/10/21 [History]


Aspirin [Vazalore] 81 mg PO DAILY 12/10/21 [History]


Brimonidine [Alphagan P 0.1% Ophth Soln] 1 drop EYERT DAILY 12/10/21 [History]


Bumetanide [Bumex] 1 mg PO DAILY 12/10/21 [History]


Multivitamin 1 each PO DAILY 12/10/21 [History]


atorvaSTATin Calcium [Atorvastatin Calcium] 20 mg PO BEDTIME 12/10/21 [History]


Doxycycline [Vibra-Tabs] 100 mg PO Q12HR 10 Days #20 tab 12/21/21 [Rx]


Insulin Aspart [NovoLOG] See Protocol SQ ACBREAKFAST 30 Days  pen 12/21/21 [Rx]








Patient Handouts:  Cellulitis, Adult, Doxylamine tablets


Referrals: 


Cecilio Haynes MD [Ordering Only Provider] - 





- Discharge Summary/Plan Comment


DC Time >30 min.: Yes


Total # of Minutes for Discharge Time: 





45





- General Info


Admission Dx/Problem (Free Text: 


                           Admission Diagnosis/Problem





Admission Diagnosis/Problem      Cellulitis











- Review of Systems


General: Denies: Fever





- Patient Data


Vitals - Most Recent: 


                                Last Vital Signs











Temp  97.8 F   12/21/21 08:00


 


Pulse  87   12/21/21 08:00


 


Resp  15   12/21/21 08:00


 


BP  124/57 L  12/21/21 08:00


 


Pulse Ox  96   12/21/21 08:00











Weight - Most Recent: 155 lb 6.814 oz


Lab Results - Last 24 hrs: 


                         Laboratory Results - last 24 hr











  12/20/21 12/20/21 12/20/21 Range/Units





  11:57 18:04 21:38 


 


POC Glucose  132 H  101 H  119 H  (70-99)  mg/dL














  12/21/21 Range/Units





  06:11 


 


POC Glucose  104 H  (70-99)  mg/dL











Med Orders - Current: 


                               Current Medications





Acetaminophen (Acetaminophen 325 Mg Tab)  650 mg PO Q6H PRN


   PRN Reason: Pain/Fever


Dextrose/Water (50% Dextrose In Water 50 Ml Syringe)  50 ml IVPUSH ASDIRECTED 

PRN


   PRN Reason: Hypoglycemia


Dextrose/Water (50% Dextrose In Water 50 Ml Syringe)  50 ml IVPUSH ASDIRECTED 

PRN


   PRN Reason: Hypoglycemia


Enoxaparin Sodium (Enoxaparin 30 Mg/0.3 Ml Syringe)  30 mg SUBCUT Q24H [facility]


   Last Admin: 12/20/21 21:52 Dose:  30 mg


   Documented by: 


Glucagon (Glucagon,Human Recombinant 1 Mg Vial)  1 mg IM ASDIRECTED PRN


   PRN Reason: Hypoglycemia


Glucagon (Glucagon,Human Recombinant 1 Mg Vial)  1 mg IM ASDIRECTED PRN


   PRN Reason: Hypoglycemia


Clindamycin Phosphate 600 mg/ (Premix)  50 mls @ 100 mls/hr IV Q6H [facility]


   Last Admin: 12/21/21 08:30 Dose:  100 mls/hr


   Documented by: 


Levofloxacin/Dextrose 750 mg/ (Premix)  150 mls @ 100 mls/hr IV Q48H [facility]


   Last Admin: 12/20/21 08:42 Dose:  100 mls/hr


   Documented by: 


Insulin Aspart (Insulin Aspart 100 Units/Ml 3 Ml Pen)  0 unit SUBCUT QIDACANDBED

[facility]; Protocol


   Last Admin: 12/21/21 06:48 Dose:  Not Given


   Documented by: 


Nystatin (Nystatin Topical Powder 15 Gm Bottle)  0 gm TOP BID [facility]


   Last Admin: 12/21/21 08:43 Dose:  15 gm


   Documented by: 


Oxycodone HCl (Oxycodone 5 Mg Tab)  5 mg PO Q4H PRN


   PRN Reason: Pain


   Last Admin: 12/10/21 18:25 Dose:  5 mg


   Documented by: 


Potassium Chloride (Potassium Chloride 10% 20 Meq/15 Ml Soln 30 Ml Ud Cup)  40 

meq PO DAILY [facility]


   Last Admin: 12/21/21 08:43 Dose:  40 meq


   Documented by: 





Discontinued Medications





Enoxaparin Sodium (Enoxaparin 40 Mg/0.4 Ml Syringe)  40 mg SUBCUT Q24H [facility]


   Last Admin: 12/10/21 00:27 Dose:  Not Given


   Documented by: 


Fentanyl (Fentanyl 100 Mcg/2 Ml Sdv)  25 mcg IVPUSH ONETIME ONE


   Stop: 12/10/21 05:58


   Last Admin: 12/10/21 06:04 Dose:  25 mcg


   Documented by: 


Vancomycin HCl 1.25 gm/ Premix  250 mls @ 166.667 mls/hr IV ONETIME ONE


   Stop: 12/09/21 19:14


   Last Admin: 12/09/21 18:51 Dose:  166.667 mls/hr


   Documented by: 


Sodium Chloride (Normal Saline)  1,000 mls @ 1,000 mls/hr IV .Bolus ONE


   Stop: 12/09/21 19:25


   Last Admin: 12/09/21 18:51 Dose:  1,000 mls/hr


   Documented by: 


Clindamycin Phosphate 600 mg/ (Sodium Chloride)  54 mls @ 100 mls/hr IV ONETIME 

ONE


   Stop: 12/09/21 20:09


   Last Admin: 12/09/21 21:30 Dose:  Not Given


   Documented by: 


Lactated Ringer's (Ringers, Lactated)  1,000 mls @ 999 mls/hr IV .BOLUS ONE


   Stop: 12/09/21 20:59


   Last Admin: 12/09/21 20:38 Dose:  999 mls/hr


   Documented by: 


Insulin Regular in 0.9 % NACL (Myxredlin In Ns 100 Unit/100 Ml)  100 mls @ 4 

mls/hr IV TITRATE [facility]; Protocol


   Last Titration: 12/12/21 11:10 Dose:  0 mls/hr, 0 mls/hr


   Documented by: 


Piperacillin Sod/Tazobactam (Sod 3.375 gm/ Sodium Chloride)  50 mls @ 100 mls/hr

IV Q6H [facility]


Vancomycin HCl 1 gm/ Sodium (Chloride)  250 mls @ 166.667 mls/hr IV BEDTIME [facility]


   Stop: 12/11/21 22:29


   Last Admin: 12/11/21 20:59 Dose:  166.667 mls/hr


   Documented by: 


Vancomycin HCl 1 gm/ Sodium (Chloride)  250 mls @ 166.667 mls/hr IV DAILY [facility]


   Stop: 12/13/21 10:29


   Last Admin: 12/13/21 08:40 Dose:  166.667 mls/hr


   Documented by: 


Sodium Chloride (Sodium Chloride 0.45%)  1,000 mls @ 100 mls/hr IV ONETIME ONE


   Stop: 12/13/21 10:38


   Last Admin: 12/13/21 00:53 Dose:  100 mls/hr


   Documented by: 


Sodium Chloride (Sodium Chloride 0.45%)  1,000 mls @ 100 mls/hr IV ASDIRECTED 

[facility]


   Stop: 12/13/21 22:14


   Last Admin: 12/13/21 16:11 Dose:  100 mls/hr


   Documented by: 


Potassium Chloride/Sodium Chloride (Normal Saline With 40 Meq Kcl)  1,000 mls @ 

150 mls/hr IV ASDIRECTED [facility]


   Stop: 12/14/21 20:09


Potassium Chloride/Sodium Chloride (Normal Saline With 40 Meq Kcl)  1,000 mls @ 

150 mls/hr IV ASDIRECTED [facility]


   Stop: 12/14/21 21:39


   Last Admin: 12/14/21 16:02 Dose:  150 mls/hr


   Documented by: 


Dextrose/Water (Dextrose 5% In Water)  1,000 mls @ 125 mls/hr IV ASDIRECTED [facility]


   Stop: 12/16/21 18:45


   Last Admin: 12/16/21 14:36 Dose:  125 mls/hr


   Documented by: 


Potassium Chloride/Sodium Chloride (Normal Saline With 40 Meq Kcl)  1,000 mls @ 

250 mls/hr IV ASDIRECTED [facility]


   Stop: 12/20/21 17:44


   Last Admin: 12/20/21 15:07 Dose:  250 mls/hr


   Documented by: 


Insulin Aspart (Insulin Aspart 100 Units/Ml 3 Ml Pen)  [facility]; 

Protocol


   Last Admin: 12/12/21 17:13 Dose:  Not Given


   Documented by: 


Insulin Human Regular (Insulin Regular, Human 100 Units/Ml 10 Ml Vial)  10 unit 

IVPUSH ONETIME ONE; Protocol


   Stop: 12/09/21 19:39


   Last Admin: 12/09/21 20:34 Dose:  10 unit


   Documented by: 


Midazolam HCl (Midazolam 1 Mg/Ml 2 Ml Sdv)  0.5 mg IVPUSH ONETIME ONE


   Stop: 12/11/21 12:39


   Last Admin: 12/11/21 14:25 Dose:  0.5 mg


   Documented by: 


Midazolam HCl (Midazolam 1 Mg/Ml 2 Ml Sdv) Confirm Administered Dose 2 mg .ROUTE

.STK-MED ONE


   Stop: 12/11/21 12:41


   Last Admin: 12/11/21 17:39 Dose:  Not Given


   Documented by: 


Midazolam HCl (Midazolam 1 Mg/Ml 2 Ml Sdv)  0.5 mg IVPUSH ONETIME ONE


   Stop: 12/11/21 15:29


   Last Admin: 12/11/21 17:37 Dose:  0.5 mg


   Documented by: 


Midazolam HCl (Midazolam 1 Mg/Ml 2 Ml Sdv) Confirm Administered Dose 2 mg .ROUTE

.STK-MED ONE


   Stop: 12/11/21 15:35


   Last Admin: 12/11/21 17:39 Dose:  Not Given


   Documented by: 


Potassium Chloride (Potassium Chloride 10% 20 Meq/15 Ml Soln 30 Ml Ud Cup)  40 

meq PO ONETIME ONE


   Stop: 12/14/21 14:31


   Last Admin: 12/14/21 14:54 Dose:  Not Given


   Documented by: 


Potassium Chloride (Potassium Chloride 20 Meq Tab.Er)  40 meq PO ONETIME ONE


   Stop: 12/16/21 13:39


   Last Admin: 12/16/21 14:36 Dose:  40 meq


   Documented by: 


Vancomycin HCl (Pharmacy To Dose - Vancomycin)  1 dose .XX ONETIME ONE


   Stop: 12/09/21 17:28


   Last Admin: 12/09/21 17:46 Dose:  Not Given


   Documented by: 


Vancomycin HCl (Pharmacy To Dose - Vancomycin)  1 dose .XX ASDIRECTED [facility]











- Exam


General: Reports: Other (Confused at baseline.).  Denies: Oriented, Cooperative


HEENT: Reports: Pupils Equal, Pupils Reactive


Neck: Reports: Supple, Trachea Midline


Lungs: Reports: Clear to Auscultation


Cardiovascular: Reports: Regular Rate, Regular Rhythm


GI/Abdominal Exam: Normal Bowel Sounds, Soft, Non-Tender


Back Exam: Reports: Normal Inspection


Extremities: Other (Erythema improved.  No swelling.  No discharge.)





<MoizPastor - Last Filed: 12/21/21 14:45>





**Discharge Summary





- Hospital Course


Free Text/Narrative:: 





I agree with the above assessment and plan. 


Discussed the case with Dr. Tony





- Referral to Home Health


Primary Care Physician: 


PCP None








- Discharge Diagnosis/Problem(s)


(1) Advanced dementia


SNOMED Code(s): 29252456


   ICD Code: F03.90 - UNSPECIFIED DEMENTIA WITHOUT BEHAVIORAL DISTURBANCE   

Status: Acute   





(2) Cellulitis


SNOMED Code(s): 531024976


   ICD Code: L03.90 - CELLULITIS, UNSPECIFIED   Status: Acute   





(3) COVID-19


SNOMED Code(s): 737304410


   ICD Code: U07.1 - COVID-19   Status: Acute   





(4) Failure to thrive in adult


SNOMED Code(s): 284587828


   ICD Code: R62.7 - ADULT FAILURE TO THRIVE   Status: Acute   





(5) Generalized weakness


SNOMED Code(s): 56988300


   ICD Code: R53.1 - WEAKNESS   Status: Acute   





- Patient Data


Vitals - Most Recent: 


                                Last Vital Signs











Temp  97.8 F   12/21/21 08:00


 


Pulse  87   12/21/21 08:00


 


Resp  15   12/21/21 08:00


 


BP  124/57 L  12/21/21 08:00


 


Pulse Ox  96   12/21/21 08:00











I&O - Last 24 hours: 


                                 Intake & Output











 12/20/21 12/21/21 12/21/21





 22:59 06:59 14:59


 


Intake Total   150


 


Output Total   156


 


Balance   -6











Lab Results - Last 24 hrs: 


                         Laboratory Results - last 24 hr











  12/20/21 12/20/21 12/21/21 Range/Units





  18:04 21:38 06:11 


 


POC Glucose  101 H  119 H  104 H  (70-99)  mg/dL











Med Orders - Current: 


                               Current Medications








Discontinued Medications





Acetaminophen (Acetaminophen 325 Mg Tab)  650 mg PO Q6H PRN


   PRN Reason: Pain/Fever


Dextrose/Water (50% Dextrose In Water 50 Ml Syringe)  50 ml IVPUSH ASDIRECTED 

PRN


   PRN Reason: Hypoglycemia


Dextrose/Water (50% Dextrose In Water 50 Ml Syringe)  50 ml IVPUSH ASDIRECTED 

PRN


   PRN Reason: Hypoglycemia


Enoxaparin Sodium (Enoxaparin 40 Mg/0.4 Ml Syringe)  40 mg SUBCUT Q24H [facility]


   Last Admin: 12/10/21 00:27 Dose:  Not Given


   Documented by: 


Enoxaparin Sodium (Enoxaparin 30 Mg/0.3 Ml Syringe)  30 mg SUBCUT Q24H [facility]


   Last Admin: 12/20/21 21:52 Dose:  30 mg


   Documented by: 


Fentanyl (Fentanyl 100 Mcg/2 Ml Sdv)  25 mcg IVPUSH ONETIME ONE


   Stop: 12/10/21 05:58


   Last Admin: 12/10/21 06:04 Dose:  25 mcg


   Documented by: 


Glucagon (Glucagon,Human Recombinant 1 Mg Vial)  1 mg IM ASDIRECTED PRN


   PRN Reason: Hypoglycemia


Glucagon (Glucagon,Human Recombinant 1 Mg Vial)  1 mg IM ASDIRECTED PRN


   PRN Reason: Hypoglycemia


Vancomycin HCl 1.25 gm/ Premix  250 mls @ 166.667 mls/hr IV ONETIME ONE


   Stop: 12/09/21 19:14


   Last Admin: 12/09/21 18:51 Dose:  166.667 mls/hr


   Documented by: 


Sodium Chloride (Normal Saline)  1,000 mls @ 1,000 mls/hr IV .Bolus ONE


   Stop: 12/09/21 19:25


   Last Admin: 12/09/21 18:51 Dose:  1,000 mls/hr


   Documented by: 


Clindamycin Phosphate 600 mg/ (Sodium Chloride)  54 mls @ 100 mls/hr IV ONETIME 

ONE


   Stop: 12/09/21 20:09


   Last Admin: 12/09/21 21:30 Dose:  Not Given


   Documented by: 


Lactated Ringer's (Ringers, Lactated)  1,000 mls @ 999 mls/hr IV .BOLUS ONE


   Stop: 12/09/21 20:59


   Last Admin: 12/09/21 20:38 Dose:  999 mls/hr


   Documented by: 


Insulin Regular in 0.9 % NACL (Myxredlin In Ns 100 Unit/100 Ml)  100 mls @ 4 

mls/hr IV TITRATE [facility]; Protocol


   Last Titration: 12/12/21 11:10 Dose:  0 mls/hr, 0 mls/hr


   Documented by: 


Piperacillin Sod/Tazobactam (Sod 3.375 gm/ Sodium Chloride)  50 mls @ 100 mls/hr

IV Q6H [facility]


Clindamycin Phosphate 600 mg/ (Premix)  50 mls @ 100 mls/hr IV Q6H [facility]


   Last Admin: 12/21/21 08:30 Dose:  100 mls/hr


   Documented by: 


Levofloxacin/Dextrose 750 mg/ (Premix)  150 mls @ 100 mls/hr IV Q48H [facility]


   Last Admin: 12/20/21 08:42 Dose:  100 mls/hr


   Documented by: 


Vancomycin HCl 1 gm/ Sodium (Chloride)  250 mls @ 166.667 mls/hr IV BEDTIME [facility]


   Stop: 12/11/21 22:29


   Last Admin: 12/11/21 20:59 Dose:  166.667 mls/hr


   Documented by: 


Vancomycin HCl 1 gm/ Sodium (Chloride)  250 mls @ 166.667 mls/hr IV DAILY [facility]


   Stop: 12/13/21 10:29


   Last Admin: 12/13/21 08:40 Dose:  166.667 mls/hr


   Documented by: 


Sodium Chloride (Sodium Chloride 0.45%)  1,000 mls @ 100 mls/hr IV ONETIME ONE


   Stop: 12/13/21 10:38


   Last Admin: 12/13/21 00:53 Dose:  100 mls/hr


   Documented by: 


Sodium Chloride (Sodium Chloride 0.45%)  1,000 mls @ 100 mls/hr IV ASDIRECTED 

[facility]


   Stop: 12/13/21 22:14


   Last Admin: 12/13/21 16:11 Dose:  100 mls/hr


   Documented by: 


Potassium Chloride/Sodium Chloride (Normal Saline With 40 Meq Kcl)  1,000 mls @ 

150 mls/hr IV ASDIRECTED [facility]


   Stop: 12/14/21 20:09


Potassium Chloride/Sodium Chloride (Normal Saline With 40 Meq Kcl)  1,000 mls @ 

150 mls/hr IV ASDIRECTED [facility]


   Stop: 12/14/21 21:39


   Last Admin: 12/14/21 16:02 Dose:  150 mls/hr


   Documented by: 


Dextrose/Water (Dextrose 5% In Water)  1,000 mls @ 125 mls/hr IV ASDIRECTED [facility]


   Stop: 12/16/21 18:45


   Last Admin: 12/16/21 14:36 Dose:  125 mls/hr


   Documented by: 


Potassium Chloride/Sodium Chloride (Normal Saline With 40 Meq Kcl)  1,000 mls @ 

250 mls/hr IV ASDIRECTED [facility]


   Stop: 12/20/21 17:44


   Last Admin: 12/20/21 15:07 Dose:  250 mls/hr


   Documented by: 


Insulin Aspart (Insulin Aspart 100 Units/Ml 3 Ml Pen)  0 unit SUBCUT TIDAC [facility]; 

Protocol


   Last Admin: 12/12/21 17:13 Dose:  Not Given


   Documented by: 


Insulin Aspart (Insulin Aspart 100 Units/Ml 3 Ml Pen)  0 unit SUBCUT QIDACANDBED

[facility]; Protocol


   Last Admin: 12/21/21 06:48 Dose:  Not Given


   Documented by: 


Insulin Human Regular (Insulin Regular, Human 100 Units/Ml 10 Ml Vial)  10 unit 

IVPUSH ONETIME ONE; Protocol


   Stop: 12/09/21 19:39


   Last Admin: 12/09/21 20:34 Dose:  10 unit


   Documented by: 


Midazolam HCl (Midazolam 1 Mg/Ml 2 Ml Sdv)  0.5 mg IVPUSH ONETIME ONE


   Stop: 12/11/21 12:39


   Last Admin: 12/11/21 14:25 Dose:  0.5 mg


   Documented by: 


Midazolam HCl (Midazolam 1 Mg/Ml 2 Ml Sdv) Confirm Administered Dose 2 mg .ROUTE

.STK-MED ONE


   Stop: 12/11/21 12:41


   Last Admin: 12/11/21 17:39 Dose:  Not Given


   Documented by: 


Midazolam HCl (Midazolam 1 Mg/Ml 2 Ml Sdv)  0.5 mg IVPUSH ONETIME ONE


   Stop: 12/11/21 15:29


   Last Admin: 12/11/21 17:37 Dose:  0.5 mg


   Documented by: 


Midazolam HCl (Midazolam 1 Mg/Ml 2 Ml Sdv) Confirm Administered Dose 2 mg .ROUTE

.STK-MED ONE


   Stop: 12/11/21 15:35


   Last Admin: 12/11/21 17:39 Dose:  Not Given


   Documented by: 


Nystatin (Nystatin Topical Powder 15 Gm Bottle)  0 gm TOP BID [facility]


   Last Admin: 12/21/21 08:43 Dose:  15 gm


   Documented by: 


Oxycodone HCl (Oxycodone 5 Mg Tab)  5 mg PO Q4H PRN


   PRN Reason: Pain


   Last Admin: 12/10/21 18:25 Dose:  5 mg


   Documented by: 


Potassium Chloride (Potassium Chloride 10% 20 Meq/15 Ml Soln 30 Ml Ud Cup)  40 

meq PO ONETIME ONE


   Stop: 12/14/21 14:31


   Last Admin: 12/14/21 14:54 Dose:  Not Given


   Documented by: 


Potassium Chloride (Potassium Chloride 20 Meq Tab.Er)  40 meq PO ONETIME ONE


   Stop: 12/16/21 13:39


   Last Admin: 12/16/21 14:36 Dose:  40 meq


   Documented by: 


Potassium Chloride (Potassium Chloride 10% 20 Meq/15 Ml Soln 30 Ml Ud Cup)  40 

meq PO DAILY [facility]


   Last Admin: 12/21/21 08:43 Dose:  40 meq


   Documented by: 


Vancomycin HCl (Pharmacy To Dose - Vancomycin)  1 dose .XX ONETIME ONE


   Stop: 12/09/21 17:28


   Last Admin: 12/09/21 17:46 Dose:  Not Given


   Documented by: 


Vancomycin HCl (Pharmacy To Dose - Vancomycin)  1 dose .XX ASDIRECTED [facility]